# Patient Record
Sex: MALE | Race: ASIAN | NOT HISPANIC OR LATINO | URBAN - METROPOLITAN AREA
[De-identification: names, ages, dates, MRNs, and addresses within clinical notes are randomized per-mention and may not be internally consistent; named-entity substitution may affect disease eponyms.]

---

## 2017-01-30 ENCOUNTER — INPATIENT (INPATIENT)
Facility: HOSPITAL | Age: 82
LOS: 3 days | Discharge: NURSING, REHAB OR LONG-TERM CARE | End: 2017-02-03
Attending: HOSPITALIST | Admitting: HOSPITALIST
Payer: MEDICARE

## 2017-01-30 VITALS
SYSTOLIC BLOOD PRESSURE: 150 MMHG | TEMPERATURE: 98 F | OXYGEN SATURATION: 98 % | RESPIRATION RATE: 18 BRPM | HEART RATE: 62 BPM | DIASTOLIC BLOOD PRESSURE: 56 MMHG

## 2017-01-30 DIAGNOSIS — Z41.8 ENCOUNTER FOR OTHER PROCEDURES FOR PURPOSES OTHER THAN REMEDYING HEALTH STATE: ICD-10-CM

## 2017-01-30 DIAGNOSIS — R33.9 RETENTION OF URINE, UNSPECIFIED: ICD-10-CM

## 2017-01-30 DIAGNOSIS — R74.0 NONSPECIFIC ELEVATION OF LEVELS OF TRANSAMINASE AND LACTIC ACID DEHYDROGENASE [LDH]: ICD-10-CM

## 2017-01-30 DIAGNOSIS — Z90.79 ACQUIRED ABSENCE OF OTHER GENITAL ORGAN(S): Chronic | ICD-10-CM

## 2017-01-30 DIAGNOSIS — E11.9 TYPE 2 DIABETES MELLITUS WITHOUT COMPLICATIONS: ICD-10-CM

## 2017-01-30 DIAGNOSIS — E78.5 HYPERLIPIDEMIA, UNSPECIFIED: ICD-10-CM

## 2017-01-30 DIAGNOSIS — E16.2 HYPOGLYCEMIA, UNSPECIFIED: ICD-10-CM

## 2017-01-30 DIAGNOSIS — I10 ESSENTIAL (PRIMARY) HYPERTENSION: ICD-10-CM

## 2017-01-30 DIAGNOSIS — G93.41 METABOLIC ENCEPHALOPATHY: ICD-10-CM

## 2017-01-30 DIAGNOSIS — N19 UNSPECIFIED KIDNEY FAILURE: ICD-10-CM

## 2017-01-30 DIAGNOSIS — N17.9 ACUTE KIDNEY FAILURE, UNSPECIFIED: ICD-10-CM

## 2017-01-30 DIAGNOSIS — R53.1 WEAKNESS: ICD-10-CM

## 2017-01-30 LAB
ALBUMIN SERPL ELPH-MCNC: 3.4 G/DL — SIGNIFICANT CHANGE UP (ref 3.3–5)
ALP SERPL-CCNC: 80 U/L — SIGNIFICANT CHANGE UP (ref 40–120)
ALT FLD-CCNC: 50 U/L — HIGH (ref 4–41)
APPEARANCE UR: CLEAR — SIGNIFICANT CHANGE UP
AST SERPL-CCNC: 59 U/L — HIGH (ref 4–40)
BACTERIA # UR AUTO: SIGNIFICANT CHANGE UP
BASE EXCESS BLDV CALC-SCNC: -0.8 MMOL/L — SIGNIFICANT CHANGE UP
BASOPHILS # BLD AUTO: 0.01 K/UL — SIGNIFICANT CHANGE UP (ref 0–0.2)
BASOPHILS NFR BLD AUTO: 0.1 % — SIGNIFICANT CHANGE UP (ref 0–2)
BILIRUB SERPL-MCNC: 0.9 MG/DL — SIGNIFICANT CHANGE UP (ref 0.2–1.2)
BILIRUB UR-MCNC: NEGATIVE — SIGNIFICANT CHANGE UP
BLOOD GAS VENOUS - CREATININE: 9.05 MG/DL — HIGH (ref 0.5–1.3)
BLOOD UR QL VISUAL: HIGH
BUN SERPL-MCNC: 120 MG/DL — HIGH (ref 7–23)
BUN SERPL-MCNC: 95 MG/DL — HIGH (ref 7–23)
CALCIUM SERPL-MCNC: 8 MG/DL — LOW (ref 8.4–10.5)
CALCIUM SERPL-MCNC: 8.6 MG/DL — SIGNIFICANT CHANGE UP (ref 8.4–10.5)
CHLORIDE BLDV-SCNC: 93 MMOL/L — LOW (ref 96–108)
CHLORIDE SERPL-SCNC: 86 MMOL/L — LOW (ref 98–107)
CHLORIDE SERPL-SCNC: 97 MMOL/L — LOW (ref 98–107)
CO2 SERPL-SCNC: 21 MMOL/L — LOW (ref 22–31)
CO2 SERPL-SCNC: 22 MMOL/L — SIGNIFICANT CHANGE UP (ref 22–31)
COLOR SPEC: YELLOW — SIGNIFICANT CHANGE UP
CREAT SERPL-MCNC: 5.22 MG/DL — HIGH (ref 0.5–1.3)
CREAT SERPL-MCNC: 8.83 MG/DL — HIGH (ref 0.5–1.3)
EOSINOPHIL # BLD AUTO: 0 K/UL — SIGNIFICANT CHANGE UP (ref 0–0.5)
EOSINOPHIL NFR BLD AUTO: 0 % — SIGNIFICANT CHANGE UP (ref 0–6)
GAS PNL BLDV: 128 MMOL/L — LOW (ref 136–146)
GLUCOSE BLDV-MCNC: 168 — HIGH (ref 70–99)
GLUCOSE SERPL-MCNC: 167 MG/DL — HIGH (ref 70–99)
GLUCOSE SERPL-MCNC: 178 MG/DL — HIGH (ref 70–99)
GLUCOSE UR-MCNC: NEGATIVE — SIGNIFICANT CHANGE UP
HCO3 BLDV-SCNC: 23 MMOL/L — SIGNIFICANT CHANGE UP (ref 20–27)
HCT VFR BLD CALC: 40.3 % — SIGNIFICANT CHANGE UP (ref 39–50)
HCT VFR BLDV CALC: 41.6 % — SIGNIFICANT CHANGE UP (ref 39–51)
HGB BLD-MCNC: 13.9 G/DL — SIGNIFICANT CHANGE UP (ref 13–17)
HGB BLDV-MCNC: 13.5 G/DL — SIGNIFICANT CHANGE UP (ref 13–17)
IMM GRANULOCYTES NFR BLD AUTO: 0.4 % — SIGNIFICANT CHANGE UP (ref 0–1.5)
KETONES UR-MCNC: NEGATIVE — SIGNIFICANT CHANGE UP
LACTATE BLDV-MCNC: 1.4 MMOL/L — SIGNIFICANT CHANGE UP (ref 0.5–2)
LEUKOCYTE ESTERASE UR-ACNC: NEGATIVE — SIGNIFICANT CHANGE UP
LYMPHOCYTES # BLD AUTO: 0.78 K/UL — LOW (ref 1–3.3)
LYMPHOCYTES # BLD AUTO: 5.2 % — LOW (ref 13–44)
MAGNESIUM SERPL-MCNC: 2.7 MG/DL — HIGH (ref 1.6–2.6)
MCHC RBC-ENTMCNC: 30.4 PG — SIGNIFICANT CHANGE UP (ref 27–34)
MCHC RBC-ENTMCNC: 34.5 % — SIGNIFICANT CHANGE UP (ref 32–36)
MCV RBC AUTO: 88.2 FL — SIGNIFICANT CHANGE UP (ref 80–100)
MONOCYTES # BLD AUTO: 1.33 K/UL — HIGH (ref 0–0.9)
MONOCYTES NFR BLD AUTO: 8.9 % — SIGNIFICANT CHANGE UP (ref 2–14)
NEUTROPHILS # BLD AUTO: 12.78 K/UL — HIGH (ref 1.8–7.4)
NEUTROPHILS NFR BLD AUTO: 85.4 % — HIGH (ref 43–77)
NITRITE UR-MCNC: NEGATIVE — SIGNIFICANT CHANGE UP
PCO2 BLDV: 44 MMHG — SIGNIFICANT CHANGE UP (ref 41–51)
PH BLDV: 7.35 PH — SIGNIFICANT CHANGE UP (ref 7.32–7.43)
PH UR: 6 — SIGNIFICANT CHANGE UP (ref 4.6–8)
PHOSPHATE SERPL-MCNC: 5.2 MG/DL — HIGH (ref 2.5–4.5)
PLATELET # BLD AUTO: 159 K/UL — SIGNIFICANT CHANGE UP (ref 150–400)
PMV BLD: 9.9 FL — SIGNIFICANT CHANGE UP (ref 7–13)
PO2 BLDV: 34 MMHG — LOW (ref 35–40)
POTASSIUM BLDV-SCNC: 4.4 MMOL/L — SIGNIFICANT CHANGE UP (ref 3.4–4.5)
POTASSIUM SERPL-MCNC: 3.1 MMOL/L — LOW (ref 3.5–5.3)
POTASSIUM SERPL-MCNC: 3.6 MMOL/L — SIGNIFICANT CHANGE UP (ref 3.5–5.3)
POTASSIUM SERPL-SCNC: 3.1 MMOL/L — LOW (ref 3.5–5.3)
POTASSIUM SERPL-SCNC: 3.6 MMOL/L — SIGNIFICANT CHANGE UP (ref 3.5–5.3)
PROT SERPL-MCNC: 7.1 G/DL — SIGNIFICANT CHANGE UP (ref 6–8.3)
PROT UR-MCNC: 30 — SIGNIFICANT CHANGE UP
RBC # BLD: 4.57 M/UL — SIGNIFICANT CHANGE UP (ref 4.2–5.8)
RBC # FLD: 13.8 % — SIGNIFICANT CHANGE UP (ref 10.3–14.5)
RBC CASTS # UR COMP ASSIST: HIGH (ref 0–?)
SAO2 % BLDV: 56.3 % — LOW (ref 60–85)
SODIUM SERPL-SCNC: 133 MMOL/L — LOW (ref 135–145)
SODIUM SERPL-SCNC: 136 MMOL/L — SIGNIFICANT CHANGE UP (ref 135–145)
SP GR SPEC: 1.02 — SIGNIFICANT CHANGE UP (ref 1–1.03)
UROBILINOGEN FLD QL: NORMAL E.U. — SIGNIFICANT CHANGE UP (ref 0.1–0.2)
WBC # BLD: 14.96 K/UL — HIGH (ref 3.8–10.5)
WBC # FLD AUTO: 14.96 K/UL — HIGH (ref 3.8–10.5)
WBC UR QL: SIGNIFICANT CHANGE UP (ref 0–?)

## 2017-01-30 PROCEDURE — 70450 CT HEAD/BRAIN W/O DYE: CPT | Mod: 26

## 2017-01-30 PROCEDURE — 71020: CPT | Mod: 26

## 2017-01-30 PROCEDURE — 99223 1ST HOSP IP/OBS HIGH 75: CPT | Mod: AI,GC

## 2017-01-30 RX ORDER — ASPIRIN/CALCIUM CARB/MAGNESIUM 324 MG
81 TABLET ORAL DAILY
Qty: 0 | Refills: 0 | Status: DISCONTINUED | OUTPATIENT
Start: 2017-01-30 | End: 2017-02-03

## 2017-01-30 RX ORDER — ACETAMINOPHEN 500 MG
650 TABLET ORAL EVERY 6 HOURS
Qty: 0 | Refills: 0 | Status: DISCONTINUED | OUTPATIENT
Start: 2017-01-30 | End: 2017-02-03

## 2017-01-30 RX ORDER — GLUCAGON INJECTION, SOLUTION 0.5 MG/.1ML
1 INJECTION, SOLUTION SUBCUTANEOUS ONCE
Qty: 0 | Refills: 0 | Status: DISCONTINUED | OUTPATIENT
Start: 2017-01-30 | End: 2017-02-03

## 2017-01-30 RX ORDER — DEXTROSE 50 % IN WATER 50 %
12.5 SYRINGE (ML) INTRAVENOUS ONCE
Qty: 0 | Refills: 0 | Status: DISCONTINUED | OUTPATIENT
Start: 2017-01-30 | End: 2017-02-03

## 2017-01-30 RX ORDER — INSULIN LISPRO 100/ML
VIAL (ML) SUBCUTANEOUS AT BEDTIME
Qty: 0 | Refills: 0 | Status: DISCONTINUED | OUTPATIENT
Start: 2017-01-30 | End: 2017-02-03

## 2017-01-30 RX ORDER — MULTIVIT-MIN/FERROUS GLUCONATE 9 MG/15 ML
1 LIQUID (ML) ORAL DAILY
Qty: 0 | Refills: 0 | Status: DISCONTINUED | OUTPATIENT
Start: 2017-01-30 | End: 2017-01-30

## 2017-01-30 RX ORDER — DEXTROSE 50 % IN WATER 50 %
25 SYRINGE (ML) INTRAVENOUS ONCE
Qty: 0 | Refills: 0 | Status: DISCONTINUED | OUTPATIENT
Start: 2017-01-30 | End: 2017-02-03

## 2017-01-30 RX ORDER — INSULIN LISPRO 100/ML
VIAL (ML) SUBCUTANEOUS
Qty: 0 | Refills: 0 | Status: DISCONTINUED | OUTPATIENT
Start: 2017-01-30 | End: 2017-02-03

## 2017-01-30 RX ORDER — POTASSIUM CHLORIDE 20 MEQ
40 PACKET (EA) ORAL EVERY 4 HOURS
Qty: 0 | Refills: 0 | Status: DISCONTINUED | OUTPATIENT
Start: 2017-01-30 | End: 2017-02-01

## 2017-01-30 RX ORDER — HEPARIN SODIUM 5000 [USP'U]/ML
5000 INJECTION INTRAVENOUS; SUBCUTANEOUS EVERY 8 HOURS
Qty: 0 | Refills: 0 | Status: DISCONTINUED | OUTPATIENT
Start: 2017-01-30 | End: 2017-02-03

## 2017-01-30 RX ORDER — SODIUM CHLORIDE 9 MG/ML
1000 INJECTION, SOLUTION INTRAVENOUS
Qty: 0 | Refills: 0 | Status: DISCONTINUED | OUTPATIENT
Start: 2017-01-30 | End: 2017-02-02

## 2017-01-30 RX ORDER — ATORVASTATIN CALCIUM 80 MG/1
40 TABLET, FILM COATED ORAL AT BEDTIME
Qty: 0 | Refills: 0 | Status: DISCONTINUED | OUTPATIENT
Start: 2017-01-30 | End: 2017-01-30

## 2017-01-30 RX ORDER — ATORVASTATIN CALCIUM 80 MG/1
10 TABLET, FILM COATED ORAL AT BEDTIME
Qty: 0 | Refills: 0 | Status: DISCONTINUED | OUTPATIENT
Start: 2017-01-30 | End: 2017-02-03

## 2017-01-30 RX ORDER — DEXTROSE 50 % IN WATER 50 %
1 SYRINGE (ML) INTRAVENOUS ONCE
Qty: 0 | Refills: 0 | Status: DISCONTINUED | OUTPATIENT
Start: 2017-01-30 | End: 2017-02-03

## 2017-01-30 RX ORDER — SODIUM CHLORIDE 9 MG/ML
2000 INJECTION INTRAMUSCULAR; INTRAVENOUS; SUBCUTANEOUS ONCE
Qty: 0 | Refills: 0 | Status: COMPLETED | OUTPATIENT
Start: 2017-01-30 | End: 2017-01-30

## 2017-01-30 RX ORDER — SODIUM CHLORIDE 9 MG/ML
1000 INJECTION, SOLUTION INTRAVENOUS
Qty: 0 | Refills: 0 | Status: DISCONTINUED | OUTPATIENT
Start: 2017-01-30 | End: 2017-02-03

## 2017-01-30 RX ADMIN — HEPARIN SODIUM 5000 UNIT(S): 5000 INJECTION INTRAVENOUS; SUBCUTANEOUS at 21:47

## 2017-01-30 RX ADMIN — HEPARIN SODIUM 5000 UNIT(S): 5000 INJECTION INTRAVENOUS; SUBCUTANEOUS at 14:08

## 2017-01-30 RX ADMIN — SODIUM CHLORIDE 1000 MILLILITER(S): 9 INJECTION INTRAMUSCULAR; INTRAVENOUS; SUBCUTANEOUS at 10:32

## 2017-01-30 RX ADMIN — Medication 40 MILLIEQUIVALENT(S): at 21:47

## 2017-01-30 RX ADMIN — Medication: at 16:16

## 2017-01-30 RX ADMIN — ATORVASTATIN CALCIUM 10 MILLIGRAM(S): 80 TABLET, FILM COATED ORAL at 21:47

## 2017-01-30 RX ADMIN — SODIUM CHLORIDE 100 MILLILITER(S): 9 INJECTION, SOLUTION INTRAVENOUS at 20:33

## 2017-01-30 NOTE — H&P ADULT. - PROBLEM SELECTOR PLAN 7
s/p hypoglycemic episode this AM.  Not on any insulin at home.  Will start ISS for now.  Measure FS regularly.

## 2017-01-30 NOTE — H&P ADULT. - PROBLEM SELECTOR PLAN 10
Improve Risk Assessment Model score: 2.  Will start HSQ Improve Risk Assessment Model score: 2.  VTE PPX indicated.  Will start HSQ

## 2017-01-30 NOTE — H&P ADULT. - PROBLEM SELECTOR PLAN 8
Will hold home losartan given elevated Cr.  Will monitor BP for now.  Q8H VS.  May start Norvasc if BP elevated.

## 2017-01-30 NOTE — ED ADULT NURSE NOTE - OBJECTIVE STATEMENT
Pt rec'd in 12, accompanied by wife. Pt A&Ox2 (knows month, but not year). Arrives with 18g saline lock to left ac by EMS. Pt c/o left arm pain, denies other complaints. Pt reports falling 5 days ago, noted with abrasion scabs to forehead and to right knee. Pt states he's not ambulatory at home, cleaned of soft stool, skin intact. Pt rec'd in 12, accompanied by wife. Pt A&Ox2 (knows month, but not year). Arrives with 18g saline lock to left ac by EMS. Pt c/o left arm pain, denies other complaints. Pt reports falling 5 days ago, noted with abrasion scabs to forehead and to right knee. Pt states he slipped when he fell because it was slippery outside. Pt was seen and discharged from another hospital for the fall. Pt states he was constipated once he got home and took laxative, having loose stools since then. Pt states he has been feeling weak lately and unable to ambulate without assistance. Pt cleaned of soft stool on arrival, skin intact. Denies chest pain or SOB, but states he felt a period of SOB this morning and that's why he came to ED. PMH of SAMARA

## 2017-01-30 NOTE — ED PROCEDURE NOTE - PROCEDURE ADDITIONAL DETAILS
Limited renal and bladder ultrasound shows a very distended bladder with over 1100ccs of urine and a 3-4cm right anterior bladder diverticulum.  Bilateral mild hydronephrosis is also noted.  See images and report in chart.  Cassi [id number]
first attempt with 18 coude curled in shaft of penis, removed with some blood. pt tolerated well.

## 2017-01-30 NOTE — H&P ADULT. - FAMILY HISTORY
No pertinent family history in first degree relatives Child  Still living? Unknown  Family history of hypertension in son, Age at diagnosis: Age Unknown

## 2017-01-30 NOTE — H&P ADULT. - PMH
DM (diabetes mellitus)    Prostate cancer DM (diabetes mellitus)    Hyperlipidemia    Hypertension    Prostate cancer

## 2017-01-30 NOTE — H&P ADULT. - PROBLEM SELECTOR PLAN 4
2/2 FTT and metabolic encephalopathy 2/2 uremia.  Will order PT consult.  Fall precautions/ambulate with asst.  May improve with resolution of uremia.

## 2017-01-30 NOTE — ED ADULT NURSE NOTE - CHIEF COMPLAINT QUOTE
p/t d/cd from [facility] 5 days ago has been week, lethargic and AMS since the discharge p/t is awake and responsive @ present half amp d50 given by ems for bs 65 repeat bs 150 mdl @ present

## 2017-01-30 NOTE — ED PROCEDURE NOTE - CPROC ED URIN DEVICE DETAIL1
Using strict sterile technique, an indwelling urinary device was inserted through the urethral meatus, and into the bladder (see size above).

## 2017-01-30 NOTE — H&P ADULT. - ATTENDING COMMENTS
82M DM2 HTN hx prostate cancer with urinary device malfunction likely causing obstructive uropathy, SAMANTHA, weakness, fall 6 days PTA  SAMANTHA due to obstruction, unknown if CKD present.  To obtain collateral info from [facility] and/or PMD. IVF, monitor electrolytes  Weakness may be due to uremia, monitor, PT eval  Insulin correction scale, hold oral DM meds  Hold ARB, follow BP use alternative agents until renal function improves

## 2017-01-30 NOTE — ED ADULT TRIAGE NOTE - CHIEF COMPLAINT QUOTE
p/t d/cd from [facility] 5 days ago has been week, lethargic and AMS since the discharge p/t is awake and responsive @ present half amp d50 given by ems for bs 65 repeat bs 150 mdl @ present

## 2017-01-30 NOTE — H&P ADULT. - NEGATIVE OPHTHALMOLOGIC SYMPTOMS
no lacrimation R/no blurred vision L/no loss of vision R/no blurred vision R/no lacrimation L/no loss of vision L

## 2017-01-30 NOTE — ED PROVIDER NOTE - PROGRESS NOTE DETAILS
ADRY EP: 83 y/o male BIBA for AMS and given D50 on route for FS of 65. Patient is A/OX2 and alert and oriented. Patient with unremarkable exam, decreased BS, moving all extremities, cardiac exam with RRR, abd exam unremarkable. Also old dry scrapes noted in forehead and right knee. Consider endocrine dysfunction, sepsis, ICH, abd pathology. Plan CBC, CMP, UA, VBG, Reassess. called Dr. Marco Antonio Roberts, unable to contact. left message for call back

## 2017-01-30 NOTE — ED PROVIDER NOTE - ATTENDING CONTRIBUTION TO CARE
Lindsey: 82 yom with DM, prostate cancer brought in for generalized weakness.  Pt had fall 6 steps 6 days ago, seen in ED, had scans and blood work negative.  Pt took stool softener and had subsequent diarrhea.  Also c.o left arm pain.  Pt appears to be in no distress, HR 50s, BP high, clear lungs, normal cardiac, soft abd upper, very firm lower abd with no tn.  no edema, healing ecchymosis and abrasion over knees forehead, left arm with from and no tn, nml strength. US shows very distended bladder with diverticulum and mild hydro, awaiting labs, likley ARF and admit

## 2017-01-30 NOTE — ED PROVIDER NOTE - MEDICAL DECISION MAKING DETAILS
82yoM hx of dm, prostate ca, pw ams and weakness. on arrival, mental status at baseline per wife, no confusion. PE with large palpable bladder, confirmed by US.  labs, monte, reass.

## 2017-01-30 NOTE — H&P ADULT. - PROBLEM SELECTOR PLAN 1
Likely 2/2 uremia.  Trend BMPs/BUN/Cr levels.  Cont. LR IVF.  Will f/u CT head non con to r/o slow ICH s/p fall.  Pt. DNR/DNI. Likely 2/2 uremia.  Trend BMPs/BUN/Cr levels.  Cont. LR IVF.  Will f/u CT head non con to r/o slow ICH s/p fall.  Holding home ASA until confirmed no ICH on head CT.  Pt. DNR/DNI.

## 2017-01-30 NOTE — ED PROVIDER NOTE - OBJECTIVE STATEMENT
82yoM hx of DM pw with ams. per ems, had low finger stick. per pt, had a fall on tuesday, but otherwise no complaints. A&Ox2. denies any complaints 82yoM hx of DM, prostate cancer pw with ams. per ems, had low finger stick. per pt, had a fall on tuesday, received full work up in [facility] w no findings, discharged on Wednesday. since then, had some constipation, took stool softenerand has had some diarhea since then. this am, felt weak and "weird" so he called EMS. since his fall, endorses some left elbow pain and soreness all over. endorses some mild "fullness" in abd.   denies fevers, chills, n/v, chest pain, abd pain, sob.

## 2017-01-30 NOTE — H&P ADULT. - PROBLEM SELECTOR PLAN 6
Likely 2/2 tylenol use.  Will f/u serum acetaminophen level.  As TAs are only mildly elevated, there should be no issue with using Tylenol for pain control.

## 2017-01-30 NOTE — H&P ADULT. - HISTORY OF PRESENT ILLNESS
WBC: 14.96, Na: 133, BUN/Cr: 120/8.83, CO2: 21, moderate hematuria.  Lemus inserted, 1.4L urine output.  Received 2L NS bolus. 82M PMH T2DM, Prostate CA s/p prostatectomy    ED: 36.7, 62, 150/56, 18, 98% RA.  WBC: 14.96, Na: 133, BUN/Cr: 120/8.83, CO2: 21, moderate hematuria.  Lemus inserted, 1.4L urine output.  Received 2L NS bolus. 82M PMH T2DM, Prostate CA s/p prostatectomy    fell last tuesday, hit forehead.  had weakness in arms and legs in days leading up to.  inability to stand.  Started to use a cane.  since fall, immobile.  Requires total assistance with ambulation, sitting up, bathroom.  Using tylenol for pain control, stomach pain, diffuse myalgia.  constipation, colace dulcolax, prune juice.  now diarrhea.  incontinent bowels.  incont after prostatectomy implant in place device stopped working awhile ago.  this am loss of appetite, unresponsive lethargic, dyspnea last night felt hot, nausea      ED: 36.7, 62, 150/56, 18, 98% RA.  WBC: 14.96, Na: 133, BUN/Cr: 120/8.83, CO2: 21, moderate hematuria.  Lemus inserted, 1.4L urine output.  Received 2L NS bolus. 82M PMH T2DM, Prostate CA s/p prostatectomy (~20 yrs ago, currently cancer free) s/p incontinence control device placement p/w generalized weakness, FTT.  The patient fell last Tuesday on his stairs and hit his forehead; received full workup at [facility] with no findings.  As per son, Mendez, the pt. had been exhibiting weakness in his extremities in the days leading up to the fall.  He recently developed an difficulty in standing up on his own and started requiring use of a cane with ambulation.  Since the fall, he has had a steep decline in immobility and has become more bed bound, requiring total assistance with ambulation, rising to a standing position and using the bathroom.   He also has been experiencing diffuse myalgia and abdominal discomfort 2/2 constipation, for which he has been taking liberal amounts of Tylenol to control.  The constipation was treated with dulcolax, colace and prune juice and he then began experiencing diarrhea and bowel incontinence.  On morning of presentation, he had complete loss of appetite and became lethargic and unresponsive.  Hypoglycemic as per EMS, treated accordingly.    He has baseline urinary incontinence after his prostatectomy and has a incontinence control device implant which he uses to assist with urination.  Lately, he has been exhibiting more urinary incontinence and told his son the the device stopped working "a while ago."  Only other symptoms were feeling hot flashes in bed last night and dyspneic, as well as 2 days of nausea.  No hx of recent travel, recent illnesses, sick contacts or medication changes.    ED: 36.7, 62, 150/56, 18, 98% RA.  WBC: 14.96, Na: 133, BUN/Cr: 120/8.83, CO2: 21, moderate hematuria.  Lemus inserted, 1.4L urine output.  Received 2L NS bolus. 82M PMH T2DM, Prostate CA s/p prostatectomy (~20 yrs ago, currently cancer free) s/p incontinence control device placement p/w generalized weakness, FTT.  The patient fell last Tuesday on his stairs and hit his forehead; received full workup at [facility] with no findings.  As per son, Mendez, the pt. had been exhibiting weakness in his extremities in the days leading up to the fall.  He recently developed an difficulty in standing up on his own and started requiring use of a cane with ambulation.  Since the fall, he has had a steep decline in immobility and has become more bed bound, requiring total assistance with ambulation, rising to a standing position and using the bathroom.   He also has been experiencing diffuse myalgia and abdominal discomfort 2/2 constipation, for which he has been taking liberal amounts of Tylenol to control.  The constipation was treated with dulcolax, colace and prune juice and he then began experiencing diarrhea and bowel incontinence.  On morning of presentation, he had complete loss of appetite and became lethargic and unresponsive.  Hypoglycemic as per EMS, treated accordingly.    He has baseline urinary incontinence after his prostatectomy and has a incontinence control device implant which he uses to assist with urination.  Lately, he has been exhibiting more urinary incontinence and told his son the the device stopped working "a while ago."  Only other symptoms were feeling hot flashes in bed last night and dyspneic, as well as 2 days of nausea.  No hx of recent travel, recent illnesses, sick contacts or medication changes.    ED: 36.7, 62, 150/56, 18, 98% RA.  WBC: 14.96, Na: 133, BUN/Cr: 120/8.83, CO2: 21, moderate hematuria.  Lemus inserted, 1.4L urine output.  Received 2L NS bolus.    A brief goals of care discussion was held with Mendez Dixon (son): currently strongly considering DNR/DNI, but will reach final conclusion upon arrival to hospital. 82M PMH T2DM, Prostate CA s/p prostatectomy (~20 yrs ago, currently cancer free) s/p incontinence control device placement p/w generalized weakness, FTT.  The patient fell last Tuesday on his stairs and hit his forehead; received full workup at [facility] with no findings.  As per son, Mendez, the pt. had been exhibiting weakness in his extremities in the days leading up to the fall.  He recently developed an difficulty in standing up on his own and started requiring use of a cane with ambulation.  Since the fall, he has had a steep decline in immobility and has become more bed bound, requiring total assistance with ambulation, rising to a standing position and using the bathroom.   He also has been experiencing diffuse myalgia and abdominal discomfort 2/2 constipation, for which he has been taking liberal amounts of Tylenol to control.  The constipation was treated with dulcolax, colace and prune juice and he then began experiencing diarrhea and bowel incontinence.  On morning of presentation, he had complete loss of appetite and became lethargic and unresponsive.  Hypoglycemic as per EMS, treated accordingly.    He has baseline urinary incontinence after his prostatectomy and has a incontinence control device implant which he uses to assist with urination.  Lately, he has been exhibiting more urinary incontinence and told his son the the device stopped working "a while ago."  Only other symptoms were feeling hot flashes in bed last night and dyspneic, as well as 2 days of nausea.  No hx of recent travel, recent illnesses, sick contacts or medication changes.  No hx of CKD as per son.    ED: 36.7, 62, 150/56, 18, 98% RA.  WBC: 14.96, Na: 133, BUN/Cr: 120/8.83, CO2: 21, moderate hematuria.  Lemus inserted, 1.4L urine output.  Received 2L NS bolus.    A brief goals of care discussion was held with Mendez Dixon (son): currently strongly considering DNR/DNI, but will reach final conclusion upon arrival to hospital.    An attempt was made to reach Dr. Arnold Roberts's office for further information., but the office was closed.  Will try again tomorrow. 82M PMH T2DM, Prostate CA s/p prostatectomy (~20 yrs ago, currently cancer free) s/p incontinence control device placement p/w generalized weakness, FTT.  The patient fell last Tuesday on his stairs and hit his forehead; received full workup at [facility] with no findings.  As per son, Mendez, the pt. had been exhibiting weakness in his extremities in the days leading up to the fall.  He recently developed an difficulty in standing up on his own and started requiring use of a cane with ambulation.  Since the fall, he has had a steep decline in immobility and has become more bed bound, requiring total assistance with ambulation, rising to a standing position and using the bathroom.   He also has been experiencing diffuse myalgia and abdominal discomfort 2/2 constipation, for which he has been taking liberal amounts of Tylenol to control.  The constipation was treated with dulcolax, colace and prune juice and he then began experiencing diarrhea and bowel incontinence.  On morning of presentation, he had complete loss of appetite and became lethargic and unresponsive.  Hypoglycemic as per EMS, treated accordingly.    He has baseline urinary incontinence after his prostatectomy and has a incontinence control device implant which he uses to assist with urination.  Lately, he has been exhibiting more urinary incontinence and told his son the the device stopped working "a while ago."  Only other symptoms were feeling hot flashes in bed last night and dyspneic, as well as 2 days of nausea.  No hx of recent travel, recent illnesses, sick contacts or medication changes.  No hx of CKD as per son.    ED: 36.7, 62, 150/56, 18, 98% RA.  WBC: 14.96, Na: 133, BUN/Cr: 120/8.83, CO2: 21, moderate hematuria.  Lemus inserted, 1.4L urine output.  Received 2L NS bolus.    A goals of care discussion was held with Mendez Dixon (son).  Him and sister are in process of becoming patient's HCPs.  DNR/DNI form signed as result of conversation.    An attempt was made to reach Dr. Arnold Roberts's office for further information., but the office was closed.  Will try again tomorrow. 82M PMH T2DM, Prostate CA s/p prostatectomy (~20 yrs ago, currently cancer free) s/p incontinence control device placement p/w generalized weakness, FTT.  The patient fell last Tuesday (1/24/17) on his stairs and hit his forehead; received full workup at [facility] with no findings.  As per son, Mendez, the pt. had been exhibiting weakness in his extremities in the days leading up to the fall.  He recently developed an difficulty in standing up on his own and started requiring use of a cane with ambulation.  Since the fall, he has had a steep decline in immobility and has become more bed bound, requiring total assistance with ambulation, rising to a standing position and using the bathroom.   He also has been experiencing diffuse myalgia and abdominal discomfort 2/2 constipation, for which he has been taking liberal amounts of Tylenol to control.  The constipation was treated with dulcolax, colace and prune juice and he then began experiencing diarrhea and bowel incontinence.  On morning of presentation, he had complete loss of appetite and became lethargic and unresponsive.  Hypoglycemic as per EMS, treated accordingly.    He has baseline urinary incontinence after his prostatectomy and has a incontinence control device implant which he uses to assist with urination.  Lately, he has been exhibiting more urinary incontinence and told his son the the device stopped working "a while ago."  Only other symptoms were feeling hot flashes in bed last night and dyspneic, as well as 2 days of nausea.  No hx of recent travel, recent illnesses, sick contacts or medication changes.  No hx of CKD as per son.    ED: 36.7, 62, 150/56, 18, 98% RA.  WBC: 14.96, Na: 133, BUN/Cr: 120/8.83, CO2: 21, moderate hematuria.  Lemus inserted, 1.4L urine output.  Received 2L NS bolus.    A goals of care discussion was held with Mendez Dixon (son).  Him and sister are in process of becoming patient's HCPs.  DNR/DNI form signed as result of conversation.    An attempt was made to reach Dr. Arnold Roberts's office for further information., but the office was closed.  Will try again tomorrow.

## 2017-01-30 NOTE — H&P ADULT. - ASSESSMENT
82M PMH T2DM, Prostate CA s/p prostatectomy (~20 yrs ago, currently cancer free) s/p incontinence control device placement p/w generalized weakness, FTT, urinary retention. 82M PMH T2DM, Prostate CA s/p prostatectomy (~20 yrs ago, currently cancer free) s/p incontinence control device placement p/w generalized weakness, FTT, urinary retention 2/2 acute metabolic encephalopathy 2/2 SAMANTHA 2/2 malfunctioning incontinence assist device. 82M PMH T2DM, Prostate CA s/p prostatectomy (~20 yrs ago, currently cancer free) s/p incontinence control device placement p/w generalized weakness, FTT, urinary retention 2/2 acute metabolic encephalopathy 2/2 uremia 2/2 SAMANTHA 2/2 malfunctioning incontinence assist device. 82M PMH T2DM, Prostate CA s/p prostatectomy (~20 yrs ago, currently cancer free), HTN, HLD s/p incontinence control device placement p/w generalized weakness, FTT, urinary retention 2/2 acute metabolic encephalopathy 2/2 uremia 2/2 SAMANTHA 2/2 malfunctioning incontinence assist device.

## 2017-01-30 NOTE — H&P ADULT. - PROBLEM SELECTOR PLAN 2
Likely 2/2 urinary obstruction from malfunctioning incontinence device.  Will consult  and renal.  F/U renal US.  Trend BUN/Cr with daily BMPs.  Cont. LR IVF.  No known hx of CKD, but will obtain further hx from PMD in AM.

## 2017-01-31 LAB
APAP SERPL-MCNC: < 15 UG/ML — LOW (ref 15–25)
BACTERIA UR CULT: SIGNIFICANT CHANGE UP
BUN SERPL-MCNC: 56 MG/DL — HIGH (ref 7–23)
BUN SERPL-MCNC: 80 MG/DL — HIGH (ref 7–23)
CALCIUM SERPL-MCNC: 8.4 MG/DL — SIGNIFICANT CHANGE UP (ref 8.4–10.5)
CALCIUM SERPL-MCNC: 8.5 MG/DL — SIGNIFICANT CHANGE UP (ref 8.4–10.5)
CHLORIDE SERPL-SCNC: 101 MMOL/L — SIGNIFICANT CHANGE UP (ref 98–107)
CHLORIDE SERPL-SCNC: 104 MMOL/L — SIGNIFICANT CHANGE UP (ref 98–107)
CO2 SERPL-SCNC: 23 MMOL/L — SIGNIFICANT CHANGE UP (ref 22–31)
CO2 SERPL-SCNC: 24 MMOL/L — SIGNIFICANT CHANGE UP (ref 22–31)
CREAT SERPL-MCNC: 2.11 MG/DL — HIGH (ref 0.5–1.3)
CREAT SERPL-MCNC: 3.74 MG/DL — HIGH (ref 0.5–1.3)
GLUCOSE SERPL-MCNC: 110 MG/DL — HIGH (ref 70–99)
GLUCOSE SERPL-MCNC: 141 MG/DL — HIGH (ref 70–99)
HBA1C BLD-MCNC: 6.9 % — HIGH (ref 4–5.6)
HCT VFR BLD CALC: 37.7 % — LOW (ref 39–50)
HGB BLD-MCNC: 12.9 G/DL — LOW (ref 13–17)
MAGNESIUM SERPL-MCNC: 2.7 MG/DL — HIGH (ref 1.6–2.6)
MCHC RBC-ENTMCNC: 30.4 PG — SIGNIFICANT CHANGE UP (ref 27–34)
MCHC RBC-ENTMCNC: 34.2 % — SIGNIFICANT CHANGE UP (ref 32–36)
MCV RBC AUTO: 88.9 FL — SIGNIFICANT CHANGE UP (ref 80–100)
PHOSPHATE SERPL-MCNC: 2.1 MG/DL — LOW (ref 2.5–4.5)
PLATELET # BLD AUTO: 166 K/UL — SIGNIFICANT CHANGE UP (ref 150–400)
PMV BLD: 9.9 FL — SIGNIFICANT CHANGE UP (ref 7–13)
POTASSIUM SERPL-MCNC: 3.5 MMOL/L — SIGNIFICANT CHANGE UP (ref 3.5–5.3)
POTASSIUM SERPL-MCNC: 3.9 MMOL/L — SIGNIFICANT CHANGE UP (ref 3.5–5.3)
POTASSIUM SERPL-SCNC: 3.5 MMOL/L — SIGNIFICANT CHANGE UP (ref 3.5–5.3)
POTASSIUM SERPL-SCNC: 3.9 MMOL/L — SIGNIFICANT CHANGE UP (ref 3.5–5.3)
RBC # BLD: 4.24 M/UL — SIGNIFICANT CHANGE UP (ref 4.2–5.8)
RBC # FLD: 13.8 % — SIGNIFICANT CHANGE UP (ref 10.3–14.5)
SODIUM SERPL-SCNC: 138 MMOL/L — SIGNIFICANT CHANGE UP (ref 135–145)
SODIUM SERPL-SCNC: 143 MMOL/L — SIGNIFICANT CHANGE UP (ref 135–145)
SPECIMEN SOURCE: SIGNIFICANT CHANGE UP
WBC # BLD: 8.7 K/UL — SIGNIFICANT CHANGE UP (ref 3.8–10.5)
WBC # FLD AUTO: 8.7 K/UL — SIGNIFICANT CHANGE UP (ref 3.8–10.5)

## 2017-01-31 PROCEDURE — 76770 US EXAM ABDO BACK WALL COMP: CPT | Mod: 26

## 2017-01-31 PROCEDURE — 99232 SBSQ HOSP IP/OBS MODERATE 35: CPT | Mod: GC

## 2017-01-31 RX ADMIN — HEPARIN SODIUM 5000 UNIT(S): 5000 INJECTION INTRAVENOUS; SUBCUTANEOUS at 14:41

## 2017-01-31 RX ADMIN — Medication 81 MILLIGRAM(S): at 14:41

## 2017-01-31 RX ADMIN — Medication 40 MILLIEQUIVALENT(S): at 01:24

## 2017-01-31 RX ADMIN — HEPARIN SODIUM 5000 UNIT(S): 5000 INJECTION INTRAVENOUS; SUBCUTANEOUS at 05:00

## 2017-01-31 RX ADMIN — ATORVASTATIN CALCIUM 10 MILLIGRAM(S): 80 TABLET, FILM COATED ORAL at 21:32

## 2017-01-31 RX ADMIN — HEPARIN SODIUM 5000 UNIT(S): 5000 INJECTION INTRAVENOUS; SUBCUTANEOUS at 21:32

## 2017-01-31 RX ADMIN — Medication 1 TABLET(S): at 14:41

## 2017-01-31 NOTE — PROVIDER CONTACT NOTE (OTHER) - ACTION/TREATMENT ORDERED:
Dr Campbell aware. No interventions ordered at this time Dr Campbell aware of vitals. No interventions ordered at this time-will continue to monitor pt

## 2017-01-31 NOTE — PHYSICAL THERAPY INITIAL EVALUATION ADULT - PERTINENT HX OF CURRENT PROBLEM, REHAB EVAL
82M PMH T2DM, Prostate CA s/p prostatectomy (~20 yrs ago, currently cancer free) s/p incontinence control device placement p/w generalized weakness, FTT.  The patient fell last Tuesday (1/24/17) on his stairs and hit his forehead; received full workup at [facility] with no findings.  As per sonMendez, the pt. had been exhibiting weakness in his extremities in the days leading up to the fall.

## 2017-01-31 NOTE — PHYSICAL THERAPY INITIAL EVALUATION ADULT - ADDITIONAL COMMENTS
Pt lives in 2nd floor walk up apartment with wife. Prior to a few weeks ago pt ambulated with a cane, however has relied more on a walker since prior to admission. Pt requires assist to ambulate and often has falls and cannot recover from floor.

## 2017-01-31 NOTE — PROVIDER CONTACT NOTE (OTHER) - ASSESSMENT
Bp 151/52 hr 56 at 5:23a. Repeat 147/57 hr 55 Bp 151/52 hr 56 at 5:23a. Repeat 147/57 hr 55 pt asymptomatic resting in bed

## 2017-02-01 LAB
BUN SERPL-MCNC: 34 MG/DL — HIGH (ref 7–23)
CALCIUM SERPL-MCNC: 9.1 MG/DL — SIGNIFICANT CHANGE UP (ref 8.4–10.5)
CHLORIDE SERPL-SCNC: 105 MMOL/L — SIGNIFICANT CHANGE UP (ref 98–107)
CO2 SERPL-SCNC: 25 MMOL/L — SIGNIFICANT CHANGE UP (ref 22–31)
CREAT SERPL-MCNC: 1.25 MG/DL — SIGNIFICANT CHANGE UP (ref 0.5–1.3)
GLUCOSE SERPL-MCNC: 94 MG/DL — SIGNIFICANT CHANGE UP (ref 70–99)
PHOSPHATE SERPL-MCNC: 1.8 MG/DL — LOW (ref 2.5–4.5)
POTASSIUM SERPL-MCNC: 4 MMOL/L — SIGNIFICANT CHANGE UP (ref 3.5–5.3)
POTASSIUM SERPL-SCNC: 4 MMOL/L — SIGNIFICANT CHANGE UP (ref 3.5–5.3)
SODIUM SERPL-SCNC: 145 MMOL/L — SIGNIFICANT CHANGE UP (ref 135–145)

## 2017-02-01 PROCEDURE — 99232 SBSQ HOSP IP/OBS MODERATE 35: CPT | Mod: GC

## 2017-02-01 RX ORDER — AMLODIPINE BESYLATE 2.5 MG/1
5 TABLET ORAL DAILY
Qty: 0 | Refills: 0 | Status: DISCONTINUED | OUTPATIENT
Start: 2017-02-01 | End: 2017-02-02

## 2017-02-01 RX ORDER — AMLODIPINE BESYLATE 2.5 MG/1
5 TABLET ORAL DAILY
Qty: 0 | Refills: 0 | Status: DISCONTINUED | OUTPATIENT
Start: 2017-02-01 | End: 2017-02-01

## 2017-02-01 RX ADMIN — HEPARIN SODIUM 5000 UNIT(S): 5000 INJECTION INTRAVENOUS; SUBCUTANEOUS at 05:19

## 2017-02-01 RX ADMIN — Medication 63.75 MILLIMOLE(S): at 11:30

## 2017-02-01 RX ADMIN — AMLODIPINE BESYLATE 5 MILLIGRAM(S): 2.5 TABLET ORAL at 21:34

## 2017-02-01 RX ADMIN — HEPARIN SODIUM 5000 UNIT(S): 5000 INJECTION INTRAVENOUS; SUBCUTANEOUS at 13:17

## 2017-02-01 RX ADMIN — ATORVASTATIN CALCIUM 10 MILLIGRAM(S): 80 TABLET, FILM COATED ORAL at 21:34

## 2017-02-01 RX ADMIN — Medication 81 MILLIGRAM(S): at 13:16

## 2017-02-01 RX ADMIN — Medication 1 TABLET(S): at 11:32

## 2017-02-01 NOTE — PROVIDER CONTACT NOTE (OTHER) - BACKGROUND
Pt admitted with acute renal failure.
dx is acute renal failure. hx of hld, htn, DM, prostate ca w hx of prostatectomy

## 2017-02-02 LAB
BUN SERPL-MCNC: 24 MG/DL — HIGH (ref 7–23)
CALCIUM SERPL-MCNC: 8.7 MG/DL — SIGNIFICANT CHANGE UP (ref 8.4–10.5)
CHLORIDE SERPL-SCNC: 101 MMOL/L — SIGNIFICANT CHANGE UP (ref 98–107)
CO2 SERPL-SCNC: 26 MMOL/L — SIGNIFICANT CHANGE UP (ref 22–31)
CREAT SERPL-MCNC: 1.06 MG/DL — SIGNIFICANT CHANGE UP (ref 0.5–1.3)
GLUCOSE SERPL-MCNC: 95 MG/DL — SIGNIFICANT CHANGE UP (ref 70–99)
HCT VFR BLD CALC: 39.9 % — SIGNIFICANT CHANGE UP (ref 39–50)
HGB BLD-MCNC: 13.5 G/DL — SIGNIFICANT CHANGE UP (ref 13–17)
MCHC RBC-ENTMCNC: 30.3 PG — SIGNIFICANT CHANGE UP (ref 27–34)
MCHC RBC-ENTMCNC: 33.8 % — SIGNIFICANT CHANGE UP (ref 32–36)
MCV RBC AUTO: 89.5 FL — SIGNIFICANT CHANGE UP (ref 80–100)
PHOSPHATE SERPL-MCNC: 1.7 MG/DL — LOW (ref 2.5–4.5)
PLATELET # BLD AUTO: 194 K/UL — SIGNIFICANT CHANGE UP (ref 150–400)
PMV BLD: 9.8 FL — SIGNIFICANT CHANGE UP (ref 7–13)
POTASSIUM SERPL-MCNC: 3.6 MMOL/L — SIGNIFICANT CHANGE UP (ref 3.5–5.3)
POTASSIUM SERPL-SCNC: 3.6 MMOL/L — SIGNIFICANT CHANGE UP (ref 3.5–5.3)
RBC # BLD: 4.46 M/UL — SIGNIFICANT CHANGE UP (ref 4.2–5.8)
RBC # FLD: 13 % — SIGNIFICANT CHANGE UP (ref 10.3–14.5)
SODIUM SERPL-SCNC: 141 MMOL/L — SIGNIFICANT CHANGE UP (ref 135–145)
WBC # BLD: 8.48 K/UL — SIGNIFICANT CHANGE UP (ref 3.8–10.5)
WBC # FLD AUTO: 8.48 K/UL — SIGNIFICANT CHANGE UP (ref 3.8–10.5)

## 2017-02-02 PROCEDURE — 99232 SBSQ HOSP IP/OBS MODERATE 35: CPT | Mod: GC

## 2017-02-02 RX ORDER — SODIUM,POTASSIUM PHOSPHATES 278-250MG
2 POWDER IN PACKET (EA) ORAL
Qty: 0 | Refills: 0 | Status: DISCONTINUED | OUTPATIENT
Start: 2017-02-02 | End: 2017-02-02

## 2017-02-02 RX ORDER — VALSARTAN 80 MG/1
160 TABLET ORAL DAILY
Qty: 0 | Refills: 0 | Status: DISCONTINUED | OUTPATIENT
Start: 2017-02-03 | End: 2017-02-03

## 2017-02-02 RX ORDER — SODIUM,POTASSIUM PHOSPHATES 278-250MG
1 POWDER IN PACKET (EA) ORAL
Qty: 0 | Refills: 0 | Status: DISCONTINUED | OUTPATIENT
Start: 2017-02-02 | End: 2017-02-02

## 2017-02-02 RX ORDER — POTASSIUM PHOSPHATE, MONOBASIC POTASSIUM PHOSPHATE, DIBASIC 236; 224 MG/ML; MG/ML
15 INJECTION, SOLUTION INTRAVENOUS ONCE
Qty: 0 | Refills: 0 | Status: COMPLETED | OUTPATIENT
Start: 2017-02-02 | End: 2017-02-02

## 2017-02-02 RX ADMIN — ATORVASTATIN CALCIUM 10 MILLIGRAM(S): 80 TABLET, FILM COATED ORAL at 22:10

## 2017-02-02 RX ADMIN — HEPARIN SODIUM 5000 UNIT(S): 5000 INJECTION INTRAVENOUS; SUBCUTANEOUS at 22:11

## 2017-02-02 RX ADMIN — Medication 1 TABLET(S): at 12:47

## 2017-02-02 RX ADMIN — HEPARIN SODIUM 5000 UNIT(S): 5000 INJECTION INTRAVENOUS; SUBCUTANEOUS at 14:06

## 2017-02-02 RX ADMIN — Medication 81 MILLIGRAM(S): at 12:47

## 2017-02-02 RX ADMIN — POTASSIUM PHOSPHATE, MONOBASIC POTASSIUM PHOSPHATE, DIBASIC 62.5 MILLIMOLE(S): 236; 224 INJECTION, SOLUTION INTRAVENOUS at 12:49

## 2017-02-02 RX ADMIN — AMLODIPINE BESYLATE 5 MILLIGRAM(S): 2.5 TABLET ORAL at 05:33

## 2017-02-03 VITALS
OXYGEN SATURATION: 100 % | HEART RATE: 64 BPM | TEMPERATURE: 99 F | RESPIRATION RATE: 18 BRPM | SYSTOLIC BLOOD PRESSURE: 135 MMHG | DIASTOLIC BLOOD PRESSURE: 55 MMHG

## 2017-02-03 LAB
BUN SERPL-MCNC: 20 MG/DL — SIGNIFICANT CHANGE UP (ref 7–23)
CALCIUM SERPL-MCNC: 9 MG/DL — SIGNIFICANT CHANGE UP (ref 8.4–10.5)
CHLORIDE SERPL-SCNC: 100 MMOL/L — SIGNIFICANT CHANGE UP (ref 98–107)
CO2 SERPL-SCNC: 27 MMOL/L — SIGNIFICANT CHANGE UP (ref 22–31)
CREAT SERPL-MCNC: 1 MG/DL — SIGNIFICANT CHANGE UP (ref 0.5–1.3)
GLUCOSE SERPL-MCNC: 90 MG/DL — SIGNIFICANT CHANGE UP (ref 70–99)
HCT VFR BLD CALC: 39.4 % — SIGNIFICANT CHANGE UP (ref 39–50)
HGB BLD-MCNC: 13.4 G/DL — SIGNIFICANT CHANGE UP (ref 13–17)
MCHC RBC-ENTMCNC: 30.2 PG — SIGNIFICANT CHANGE UP (ref 27–34)
MCHC RBC-ENTMCNC: 34 % — SIGNIFICANT CHANGE UP (ref 32–36)
MCV RBC AUTO: 88.9 FL — SIGNIFICANT CHANGE UP (ref 80–100)
PHOSPHATE SERPL-MCNC: 1.9 MG/DL — LOW (ref 2.5–4.5)
PLATELET # BLD AUTO: 205 K/UL — SIGNIFICANT CHANGE UP (ref 150–400)
PMV BLD: 9.5 FL — SIGNIFICANT CHANGE UP (ref 7–13)
POTASSIUM SERPL-MCNC: 3.6 MMOL/L — SIGNIFICANT CHANGE UP (ref 3.5–5.3)
POTASSIUM SERPL-SCNC: 3.6 MMOL/L — SIGNIFICANT CHANGE UP (ref 3.5–5.3)
RBC # BLD: 4.43 M/UL — SIGNIFICANT CHANGE UP (ref 4.2–5.8)
RBC # FLD: 12.8 % — SIGNIFICANT CHANGE UP (ref 10.3–14.5)
SODIUM SERPL-SCNC: 140 MMOL/L — SIGNIFICANT CHANGE UP (ref 135–145)
WBC # BLD: 9.58 K/UL — SIGNIFICANT CHANGE UP (ref 3.8–10.5)
WBC # FLD AUTO: 9.58 K/UL — SIGNIFICANT CHANGE UP (ref 3.8–10.5)

## 2017-02-03 PROCEDURE — 99239 HOSP IP/OBS DSCHRG MGMT >30: CPT

## 2017-02-03 RX ORDER — VALSARTAN 80 MG/1
1 TABLET ORAL
Qty: 0 | Refills: 0 | COMMUNITY
Start: 2017-02-03

## 2017-02-03 RX ORDER — POTASSIUM PHOSPHATE, MONOBASIC POTASSIUM PHOSPHATE, DIBASIC 236; 224 MG/ML; MG/ML
15 INJECTION, SOLUTION INTRAVENOUS ONCE
Qty: 0 | Refills: 0 | Status: COMPLETED | OUTPATIENT
Start: 2017-02-03 | End: 2017-02-03

## 2017-02-03 RX ADMIN — Medication 1 TABLET(S): at 13:05

## 2017-02-03 RX ADMIN — VALSARTAN 160 MILLIGRAM(S): 80 TABLET ORAL at 06:39

## 2017-02-03 RX ADMIN — HEPARIN SODIUM 5000 UNIT(S): 5000 INJECTION INTRAVENOUS; SUBCUTANEOUS at 06:38

## 2017-02-03 RX ADMIN — POTASSIUM PHOSPHATE, MONOBASIC POTASSIUM PHOSPHATE, DIBASIC 62.5 MILLIMOLE(S): 236; 224 INJECTION, SOLUTION INTRAVENOUS at 10:41

## 2017-02-03 NOTE — DISCHARGE NOTE ADULT - ADDITIONAL INSTRUCTIONS
Please follow up with your PMD within 1 week of discharge.  Follow up with urology Dr. Uriel King within 1 week of discharge [phone] Please follow up with your PMD Dr. Marco Antonio Roberts on 2/13/2017 at 3 PM  Follow up with urology Dr. Uriel King within 1 week of discharge [phone]

## 2017-02-03 NOTE — DISCHARGE NOTE ADULT - PLAN OF CARE
To manage kidney disease You developed acute kidney failure likely due to obstruction in your urinary tract. Your kidney function improved after insertion of Lemus catheter. Please continue to use Lemus and follow up with a urologist Dr. King. Continue to have adequate fluid hydration and meal intake Please continue your home medications for BP control and follow up with your PMD Continue to monitor fingersticks at home and continue with your home diabetes regimen Continue to monitor fingersticks at home and continue with your home diabetes regimen. Please eat a low carbohydrate diet. Please follow up with your PMD Dr. Roberts for further management of your diabetes.

## 2017-02-03 NOTE — DISCHARGE NOTE ADULT - CARE PLAN
Principal Discharge DX:	SAMANTHA (acute kidney injury)  Goal:	To manage kidney disease  Instructions for follow-up, activity and diet:	You developed acute kidney failure likely due to obstruction in your urinary tract. Your kidney function improved after insertion of Lemus catheter. Please continue to use Lemus and follow up with a urologist Dr. King.  Secondary Diagnosis:	Generalized weakness  Instructions for follow-up, activity and diet:	Continue to have adequate fluid hydration and meal intake  Secondary Diagnosis:	Hypertension  Instructions for follow-up, activity and diet:	Please continue your home medications for BP control and follow up with your PMD  Secondary Diagnosis:	Type 2 diabetes mellitus without complication, with long-term current use of insulin  Instructions for follow-up, activity and diet:	Continue to monitor fingersticks at home and continue with your home diabetes regimen Principal Discharge DX:	SAMANTHA (acute kidney injury)  Goal:	To manage kidney disease  Instructions for follow-up, activity and diet:	You developed acute kidney failure likely due to obstruction in your urinary tract. Your kidney function improved after insertion of Lemus catheter. Please continue to use Lemus and follow up with a urologist Dr. King.  Secondary Diagnosis:	Generalized weakness  Instructions for follow-up, activity and diet:	Continue to have adequate fluid hydration and meal intake  Secondary Diagnosis:	Hypertension  Instructions for follow-up, activity and diet:	Please continue your home medications for BP control and follow up with your PMD  Secondary Diagnosis:	Type 2 diabetes mellitus without complication, with long-term current use of insulin  Instructions for follow-up, activity and diet:	Continue to monitor fingersticks at home and continue with your home diabetes regimen. Please eat a low carbohydrate diet. Please follow up with your PMD Dr. Roberts for further management of your diabetes.

## 2017-02-03 NOTE — DISCHARGE NOTE ADULT - PATIENT PORTAL LINK FT
“You can access the [facility] Patient Portal, offered by [facility], by registering with the following website: [email or web address]”

## 2017-02-03 NOTE — DISCHARGE NOTE ADULT - MEDICATION SUMMARY - MEDICATIONS TO TAKE
I will START or STAY ON the medications listed below when I get home from the hospital:    aspirin 81 mg oral delayed release tablet  -- 1 tab(s) by mouth once a day  -- Indication: For Need for prophylactic measure    glipiZIDE 5 mg oral tablet  -- 1 tab(s) by mouth 2 times a day  -- Indication: For DM (diabetes mellitus)    Actos 15 mg oral tablet  -- 1 tab(s) by mouth once a day  -- Indication: For DM (diabetes mellitus)    Lipitor 10 mg oral tablet  -- 1 tab(s) by mouth once a day  -- Indication: For Hyperlipidemia    Diovan  mg-12.5 mg oral tablet  -- 1 tab(s) by mouth once a day  -- Indication: For Hypertension    Dulcolax Stool Softener 100 mg oral capsule  -- 1 cap(s) by mouth 2 times a day, As Needed  -- Indication: For Need for prophylactic measure    Colace 100 mg oral capsule  -- 1 cap(s) by mouth 2 times a day, As Needed  -- Indication: For Need for prophylactic measure    Ocuvite Lutein oral capsule  -- 1 cap(s) by mouth once a day  -- Indication: For Need for prophylactic measure

## 2017-02-03 NOTE — DISCHARGE NOTE ADULT - HOSPITAL COURSE
82M PMH T2DM, Prostate CA s/p prostatectomy (~20 yrs ago, currently cancer free) s/p incontinence control device placement p/w generalized weakness, FTT.  The patient fell last Tuesday (1/24/17) on his stairs and hit his forehead; received full workup at [facility] with no findings.  As per son, Mendez, the pt. had been exhibiting weakness in his extremities in the days leading up to the fall.  He recently developed an difficulty in standing up on his own and started requiring use of a cane with ambulation.  Since the fall, he has had a steep decline in immobility and has become more bed bound, requiring total assistance with ambulation, rising to a standing position and using the bathroom.   He also has been experiencing diffuse myalgia and abdominal discomfort 2/2 constipation, for which he has been taking liberal amounts of Tylenol to control.  The constipation was treated with dulcolax, colace and prune juice and he then began experiencing diarrhea and bowel incontinence.  On morning of presentation, he had complete loss of appetite and became lethargic and unresponsive.  Hypoglycemic as per EMS, treated accordingly.    He has baseline urinary incontinence after his prostatectomy and has a incontinence control device implant which he uses to assist with urination.  Lately, he has been exhibiting more urinary incontinence and told his son the the device stopped working "a while ago."  Only other symptoms were feeling hot flashes in bed last night and dyspneic, as well as 2 days of nausea.  No hx of recent travel, recent illnesses, sick contacts or medication changes.  No hx of CKD as per son.    ED: 36.7, 62, 150/56, 18, 98% RA.  WBC: 14.96, Na: 133, BUN/Cr: 120/8.83, CO2: 21, moderate hematuria.  Lemus inserted, 1.4L urine output.  Received 2L NS bolus.    A goals of care discussion was held with Mendez Dixon (son).  Him and sister are in process of becoming patient's HCPs.  DNR/DNI form signed as result of conversation.      Hospital Course:      Patient was admitted for urinary retention secondary to malfunction urinary assist device causing SAMANTHA and uremia with metabolic encephalopathy. A Lemus catheter was placed to assist drainage. Renal ultrasound showed no pathology of the kidneys. Urology evaluated the patient and wanted to follow up with him on an outpatient basis with the Lemus in place. Overall, he clinically improved with adequate urinary output after the obstruction was relieved. Course complicated by penile meatal bleeding after he tugged on catheter but that had since resolved. PT evaluated the patient and recommended rehab. SAMANTHA resolved.    Patient is stable for discharge with Lemus to be able to go to rehab. He has been instructed to follow up with urology on an outpatient basis. 82M PMH T2DM, Prostate CA s/p prostatectomy (~20 yrs ago, currently cancer free) s/p incontinence control device placement p/w generalized weakness, FTT.  The patient fell last Tuesday (1/24/17) on his stairs and hit his forehead; received full workup at [facility] with no findings.  As per son, Mendez, the pt. had been exhibiting weakness in his extremities in the days leading up to the fall.  He recently developed an difficulty in standing up on his own and started requiring use of a cane with ambulation.  Since the fall, he has had a steep decline in immobility and has become more bed bound, requiring total assistance with ambulation, rising to a standing position and using the bathroom.   He also has been experiencing diffuse myalgia and abdominal discomfort 2/2 constipation, for which he has been taking liberal amounts of Tylenol to control.  The constipation was treated with dulcolax, colace and prune juice and he then began experiencing diarrhea and bowel incontinence.  On morning of presentation, he had complete loss of appetite and became lethargic and unresponsive.  Hypoglycemic as per EMS, treated accordingly.    He has baseline urinary incontinence after his prostatectomy and has a incontinence control device implant which he uses to assist with urination.  Lately, he has been exhibiting more urinary incontinence and told his son the the device stopped working "a while ago."  Only other symptoms were feeling hot flashes in bed last night and dyspneic, as well as 2 days of nausea.  No hx of recent travel, recent illnesses, sick contacts or medication changes.  No hx of CKD as per son.    ED: 36.7, 62, 150/56, 18, 98% RA.  WBC: 14.96, Na: 133, BUN/Cr: 120/8.83, CO2: 21, moderate hematuria.  Lemus inserted, 1.4L urine output.  Received 2L NS bolus.    A goals of care discussion was held with Mendez Dixon (son).  Him and sister are in process of becoming patient's HCPs.  DNR/DNI form signed as result of conversation.      Hospital Course:      Patient was admitted for urinary retention secondary to malfunction urinary assist device causing SAMANTHA and uremia with metabolic encephalopathy. A Lemus catheter was placed to assist drainage. Renal ultrasound showed no pathology of the kidneys. Urology evaluated the patient and wanted to follow up with him on an outpatient basis with the Lemus in place. Overall, he clinically improved with adequate urinary output after the obstruction was relieved. Course complicated by penile meatal bleeding after he tugged on catheter but that had since resolved. PT evaluated the patient and recommended rehab. SAMANTHA resolved on discharge.    Patient is stable for discharge with Lemus to be able to go to rehab. He has been instructed to follow up with urology on an outpatient basis with Dr. King 82M PMH T2DM, Prostate CA s/p prostatectomy (~20 yrs ago, currently cancer free) s/p incontinence control device placement p/w generalized weakness, FTT.  The patient fell last Tuesday (1/24/17) on his stairs and hit his forehead; received full workup at [facility] with no findings.  As per son, Mendez, the pt. had been exhibiting weakness in his extremities in the days leading up to the fall.  He recently developed an difficulty in standing up on his own and started requiring use of a cane with ambulation.  Since the fall, he has had a steep decline in immobility and has become more bed bound, requiring total assistance with ambulation, rising to a standing position and using the bathroom.   He also has been experiencing diffuse myalgia and abdominal discomfort 2/2 constipation, for which he has been taking liberal amounts of Tylenol to control.  The constipation was treated with dulcolax, colace and prune juice and he then began experiencing diarrhea and bowel incontinence.  On morning of presentation, he had complete loss of appetite and became lethargic and unresponsive.  Hypoglycemic as per EMS, treated accordingly.    He has baseline urinary incontinence after his prostatectomy and has a incontinence control device implant which he uses to assist with urination.  Lately, he has been exhibiting more urinary incontinence and told his son the the device stopped working "a while ago."  Only other symptoms were feeling hot flashes in bed last night and dyspneic, as well as 2 days of nausea.  No hx of recent travel, recent illnesses, sick contacts or medication changes.  No hx of CKD as per son.    ED: 36.7, 62, 150/56, 18, 98% RA.  WBC: 14.96, Na: 133, BUN/Cr: 120/8.83, CO2: 21, moderate hematuria.  Lemus inserted, 1.4L urine output.  Received 2L NS bolus.    A goals of care discussion was held with Mendez Dixon (son).  Him and sister are in process of becoming patient's HCPs.  DNR/DNI form signed as result of conversation.      Hospital Course:      Patient was admitted for urinary retention secondary to malfunction urinary assist device causing SAMANTHA and uremia with metabolic encephalopathy. A Lemus catheter was placed to assist drainage. Renal ultrasound showed no pathology of the kidneys. Urology evaluated the patient and wanted to follow up with him on an outpatient basis with the Lemus in place. Overall, he clinically improved with adequate urinary output after the obstruction was relieved. Course complicated by penile meatal bleeding after he tugged on catheter but that had since resolved. PT evaluated the patient and recommended rehab. SAMANTHA resolved on discharge.    Patient is stable for discharge with Lemus to be able to go to rehab. He has been instructed to follow up with urology on an outpatient basis with Dr. King ([phone]) 82M PMH T2DM, Prostate CA s/p prostatectomy (~20 yrs ago, currently cancer free) s/p incontinence control device placement p/w generalized weakness, FTT.  The patient fell last Tuesday (1/24/17) on his stairs and hit his forehead; received full workup at [facility] with no findings.  As per son, Mendez, the pt. had been exhibiting weakness in his extremities in the days leading up to the fall.  He recently developed an difficulty in standing up on his own and started requiring use of a cane with ambulation.  Since the fall, he has had a steep decline in immobility and has become more bed bound, requiring total assistance with ambulation, rising to a standing position and using the bathroom.   He also has been experiencing diffuse myalgia and abdominal discomfort 2/2 constipation, for which he has been taking liberal amounts of Tylenol to control.  The constipation was treated with dulcolax, colace and prune juice and he then began experiencing diarrhea and bowel incontinence.  On morning of presentation, he had complete loss of appetite and became lethargic and unresponsive.  Hypoglycemic as per EMS, treated accordingly.    He has baseline urinary incontinence after his prostatectomy and has a incontinence control device implant which he uses to assist with urination.  Lately, he has been exhibiting more urinary incontinence and told his son the the device stopped working "a while ago."  Only other symptoms were feeling hot flashes in bed last night and dyspneic, as well as 2 days of nausea.  No hx of recent travel, recent illnesses, sick contacts or medication changes.  No hx of CKD as per son.    ED: 36.7, 62, 150/56, 18, 98% RA.  WBC: 14.96, Na: 133, BUN/Cr: 120/8.83, CO2: 21, moderate hematuria.  Lemus inserted, 1.4L urine output.  Received 2L NS bolus.    A goals of care discussion was held with Mendez Dixon (son).  Him and sister are in process of becoming patient's HCPs.  DNR/DNI form signed as result of conversation.      Hospital Course:      Patient was admitted for urinary retention secondary to malfunction urinary assist device causing SAMANTHA and uremia with metabolic encephalopathy. A Lemus catheter was placed to assist drainage. Renal ultrasound showed no pathology of the kidneys. Urology evaluated the patient and wanted to follow up with him on an outpatient basis with the Lemus in place. Overall, he clinically improved with adequate urinary output after the obstruction was relieved. Course complicated by penile meatal bleeding after he tugged on catheter but that had since resolved. PT evaluated the patient and recommended rehab. SAMANTHA resolved on discharge.    Patient is stable for discharge with Lemus to be able to go to rehab. He has been instructed to follow up with urology on an outpatient basis with Dr. King ([phone]) as well as his primary medical doctor Dr. Roberts. 82M PMH T2DM, Prostate CA s/p prostatectomy (~20 yrs ago, currently cancer free) s/p incontinence control device placement p/w generalized weakness, FTT.  The patient fell last Tuesday (1/24/17) on his stairs and hit his forehead; received full workup at [facility] with no findings.  As per son, Mendez, the pt. had been exhibiting weakness in his extremities in the days leading up to the fall.  He recently developed an difficulty in standing up on his own and started requiring use of a cane with ambulation.  Since the fall, he has had a steep decline in immobility and has become more bed bound, requiring total assistance with ambulation, rising to a standing position and using the bathroom.   He also has been experiencing diffuse myalgia and abdominal discomfort 2/2 constipation, for which he has been taking liberal amounts of Tylenol to control.  The constipation was treated with dulcolax, colace and prune juice and he then began experiencing diarrhea and bowel incontinence.  On morning of presentation, he had complete loss of appetite and became lethargic and unresponsive.  Hypoglycemic as per EMS, treated accordingly.    He has baseline urinary incontinence after his prostatectomy and has a incontinence control device implant which he uses to assist with urination.  Lately, he has been exhibiting more urinary incontinence and told his son the the device stopped working "a while ago."  Only other symptoms were feeling hot flashes in bed last night and dyspneic, as well as 2 days of nausea.  No hx of recent travel, recent illnesses, sick contacts or medication changes.  No hx of CKD as per son.    ED: 36.7, 62, 150/56, 18, 98% RA.  WBC: 14.96, Na: 133, BUN/Cr: 120/8.83, CO2: 21, moderate hematuria.  Lemus inserted, 1.4L urine output.  Received 2L NS bolus.    A goals of care discussion was held with Mendez Dixon (son).  Him and sister are in process of becoming patient's HCPs.  DNR/DNI form signed as result of conversation.      Hospital Course:      Patient was admitted for urinary retention secondary to malfunction urinary assist device causing SAMANTHA and uremia with metabolic encephalopathy. A Lemus catheter was placed to assist drainage. Renal ultrasound showed no pathology of the kidneys. Urology evaluated the patient and wanted to follow up with him on an outpatient basis with the Lemus in place. Overall, he clinically improved with adequate urinary output after the obstruction was relieved. Course complicated by penile meatal bleeding after he tugged on catheter but that had since resolved. PT evaluated the patient and recommended rehab. SAMANTHA resolved on discharge.    Patient is stable for discharge with Lemus to be able to go to rehab. He has been instructed to follow up with urology on an outpatient basis with Dr. King ([phone]) as well as his primary medical doctor Dr. Roberts.

## 2017-02-03 NOTE — DISCHARGE NOTE ADULT - SECONDARY DIAGNOSIS.
Generalized weakness Hypertension Type 2 diabetes mellitus without complication, with long-term current use of insulin

## 2017-02-03 NOTE — DISCHARGE NOTE ADULT - CARE PROVIDERS DIRECT ADDRESSES
[email or web address],DirectAddress_Unknown [email or web address],DirectAddress_Unknown,DirectAddress_Unknown

## 2017-02-03 NOTE — DISCHARGE NOTE ADULT - PROVIDER TOKENS
BIANCA:'[id number]' TOKEN:'[id number]',FREE:[LAST:[Roberts],FIRST:[Marco Antonio],PHONE:[phone]],FAX:[(   [phone]    -],ADDRESS:[[address] [place] [address], [address] [place] [address]]]

## 2017-03-08 PROBLEM — Z00.00 ENCOUNTER FOR PREVENTIVE HEALTH EXAMINATION: Noted: 2017-03-08

## 2017-03-15 ENCOUNTER — APPOINTMENT (OUTPATIENT)
Dept: UROLOGY | Facility: CLINIC | Age: 82
End: 2017-03-15

## 2017-03-24 ENCOUNTER — APPOINTMENT (OUTPATIENT)
Dept: UROLOGY | Facility: CLINIC | Age: 82
End: 2017-03-24

## 2017-05-05 ENCOUNTER — APPOINTMENT (OUTPATIENT)
Dept: UROLOGY | Facility: CLINIC | Age: 82
End: 2017-05-05
Payer: MEDICARE

## 2017-05-05 ENCOUNTER — OUTPATIENT (OUTPATIENT)
Dept: OUTPATIENT SERVICES | Facility: HOSPITAL | Age: 82
LOS: 1 days | End: 2017-05-05
Payer: MEDICARE

## 2017-05-05 VITALS
HEART RATE: 70 BPM | DIASTOLIC BLOOD PRESSURE: 79 MMHG | SYSTOLIC BLOOD PRESSURE: 129 MMHG | WEIGHT: 130 LBS | HEIGHT: 66 IN | RESPIRATION RATE: 16 BRPM | BODY MASS INDEX: 20.89 KG/M2 | TEMPERATURE: 97.8 F

## 2017-05-05 DIAGNOSIS — E11.8 TYPE 2 DIABETES MELLITUS WITH UNSPECIFIED COMPLICATIONS: ICD-10-CM

## 2017-05-05 DIAGNOSIS — Z90.79 ACQUIRED ABSENCE OF OTHER GENITAL ORGAN(S): Chronic | ICD-10-CM

## 2017-05-05 DIAGNOSIS — Z85.46 PERSONAL HISTORY OF MALIGNANT NEOPLASM OF PROSTATE: ICD-10-CM

## 2017-05-05 PROCEDURE — 51702 INSERT TEMP BLADDER CATH: CPT

## 2017-05-05 PROCEDURE — 99204 OFFICE O/P NEW MOD 45 MIN: CPT | Mod: 25

## 2017-05-05 RX ORDER — LOSARTAN POTASSIUM 50 MG/1
50 TABLET, FILM COATED ORAL
Refills: 0 | Status: ACTIVE | COMMUNITY

## 2017-05-05 RX ORDER — AMOXICILLIN AND CLAVULANATE POTASSIUM 500; 125 MG/1; 1/1
500-125 TABLET, FILM COATED ORAL
Refills: 0 | Status: ACTIVE | COMMUNITY

## 2017-05-05 RX ORDER — ASPIRIN 81 MG
81 TABLET, DELAYED RELEASE (ENTERIC COATED) ORAL
Refills: 0 | Status: ACTIVE | COMMUNITY

## 2017-05-05 RX ORDER — DOCUSATE SODIUM 100 MG
100 TABLET ORAL
Refills: 0 | Status: ACTIVE | COMMUNITY

## 2017-05-05 RX ORDER — ACETAMINOPHEN 325 MG/1
325 TABLET, FILM COATED ORAL
Refills: 0 | Status: ACTIVE | COMMUNITY

## 2017-05-05 RX ORDER — ATORVASTATIN CALCIUM 10 MG/1
10 TABLET, FILM COATED ORAL
Refills: 0 | Status: ACTIVE | COMMUNITY

## 2017-05-05 RX ORDER — HYDROCHLOROTHIAZIDE 12.5 MG/1
12.5 CAPSULE ORAL
Refills: 0 | Status: ACTIVE | COMMUNITY

## 2017-05-09 LAB — BACTERIA UR CULT: ABNORMAL

## 2017-05-11 DIAGNOSIS — R33.8 OTHER RETENTION OF URINE: ICD-10-CM

## 2017-05-11 DIAGNOSIS — T83.111A BREAKDOWN (MECHANICAL) OF IMPLANTED URINARY SPHINCTER, INITIAL ENCOUNTER: ICD-10-CM

## 2017-05-11 DIAGNOSIS — E11.8 TYPE 2 DIABETES MELLITUS WITH UNSPECIFIED COMPLICATIONS: ICD-10-CM

## 2017-05-11 DIAGNOSIS — Z92.89 PERSONAL HISTORY OF OTHER MEDICAL TREATMENT: ICD-10-CM

## 2017-06-23 ENCOUNTER — APPOINTMENT (OUTPATIENT)
Dept: UROLOGY | Facility: CLINIC | Age: 82
End: 2017-06-23
Payer: MEDICARE

## 2017-06-23 ENCOUNTER — OUTPATIENT (OUTPATIENT)
Dept: OUTPATIENT SERVICES | Facility: HOSPITAL | Age: 82
LOS: 1 days | End: 2017-06-23
Payer: MEDICARE

## 2017-06-23 DIAGNOSIS — R35.0 FREQUENCY OF MICTURITION: ICD-10-CM

## 2017-06-23 DIAGNOSIS — Z90.79 ACQUIRED ABSENCE OF OTHER GENITAL ORGAN(S): Chronic | ICD-10-CM

## 2017-06-23 PROCEDURE — 52000 CYSTOURETHROSCOPY: CPT

## 2017-06-26 ENCOUNTER — APPOINTMENT (OUTPATIENT)
Age: 82
End: 2017-06-26

## 2017-06-26 DIAGNOSIS — T83.111S BREAKDOWN (MECHANICAL) OF IMPLANTED URINARY SPHINCTER, SEQUELA: ICD-10-CM

## 2017-06-26 DIAGNOSIS — R33.8 OTHER RETENTION OF URINE: ICD-10-CM

## 2017-06-26 LAB — BACTERIA UR CULT: ABNORMAL

## 2017-07-03 ENCOUNTER — INPATIENT (INPATIENT)
Facility: HOSPITAL | Age: 82
LOS: 10 days | Discharge: HOME | DRG: 663 | End: 2017-07-14
Attending: HOSPITALIST | Admitting: HOSPITALIST
Payer: MEDICARE

## 2017-07-03 VITALS
SYSTOLIC BLOOD PRESSURE: 134 MMHG | OXYGEN SATURATION: 96 % | WEIGHT: 130.07 LBS | HEART RATE: 57 BPM | HEIGHT: 66 IN | DIASTOLIC BLOOD PRESSURE: 64 MMHG | RESPIRATION RATE: 17 BRPM | TEMPERATURE: 98 F

## 2017-07-03 DIAGNOSIS — Z90.79 ACQUIRED ABSENCE OF OTHER GENITAL ORGAN(S): Chronic | ICD-10-CM

## 2017-07-03 DIAGNOSIS — T83.111A BREAKDOWN (MECHANICAL) OF IMPLANTED URINARY SPHINCTER, INITIAL ENCOUNTER: ICD-10-CM

## 2017-07-03 LAB
ANION GAP SERPL CALC-SCNC: 10 MMOL/L — SIGNIFICANT CHANGE UP (ref 5–17)
APTT BLD: 35.2 SEC — SIGNIFICANT CHANGE UP (ref 27.5–37.4)
BLD GP AB SCN SERPL QL: NEGATIVE — SIGNIFICANT CHANGE UP
BUN SERPL-MCNC: 24 MG/DL — HIGH (ref 7–23)
CALCIUM SERPL-MCNC: 9.7 MG/DL — SIGNIFICANT CHANGE UP (ref 8.4–10.5)
CHLORIDE SERPL-SCNC: 102 MMOL/L — SIGNIFICANT CHANGE UP (ref 96–108)
CO2 SERPL-SCNC: 29 MMOL/L — SIGNIFICANT CHANGE UP (ref 22–31)
CREAT SERPL-MCNC: 1.01 MG/DL — SIGNIFICANT CHANGE UP (ref 0.5–1.3)
GLUCOSE SERPL-MCNC: 140 MG/DL — HIGH (ref 70–99)
HCT VFR BLD CALC: 40.4 % — SIGNIFICANT CHANGE UP (ref 39–50)
HGB BLD-MCNC: 13.9 G/DL — SIGNIFICANT CHANGE UP (ref 13–17)
INR BLD: 1.07 RATIO — SIGNIFICANT CHANGE UP (ref 0.88–1.16)
MCHC RBC-ENTMCNC: 31.4 PG — SIGNIFICANT CHANGE UP (ref 27–34)
MCHC RBC-ENTMCNC: 34.4 GM/DL — SIGNIFICANT CHANGE UP (ref 32–36)
MCV RBC AUTO: 91.5 FL — SIGNIFICANT CHANGE UP (ref 80–100)
PLATELET # BLD AUTO: 196 K/UL — SIGNIFICANT CHANGE UP (ref 150–400)
POTASSIUM SERPL-MCNC: 4 MMOL/L — SIGNIFICANT CHANGE UP (ref 3.5–5.3)
POTASSIUM SERPL-SCNC: 4 MMOL/L — SIGNIFICANT CHANGE UP (ref 3.5–5.3)
PROTHROM AB SERPL-ACNC: 11.7 SEC — SIGNIFICANT CHANGE UP (ref 9.8–12.7)
RBC # BLD: 4.41 M/UL — SIGNIFICANT CHANGE UP (ref 4.2–5.8)
RBC # FLD: 13 % — SIGNIFICANT CHANGE UP (ref 10.3–14.5)
RH IG SCN BLD-IMP: POSITIVE — SIGNIFICANT CHANGE UP
SODIUM SERPL-SCNC: 141 MMOL/L — SIGNIFICANT CHANGE UP (ref 135–145)
WBC # BLD: 8.5 K/UL — SIGNIFICANT CHANGE UP (ref 3.8–10.5)
WBC # FLD AUTO: 8.5 K/UL — SIGNIFICANT CHANGE UP (ref 3.8–10.5)

## 2017-07-03 RX ORDER — INSULIN LISPRO 100/ML
VIAL (ML) SUBCUTANEOUS
Qty: 0 | Refills: 0 | Status: DISCONTINUED | OUTPATIENT
Start: 2017-07-03 | End: 2017-07-05

## 2017-07-03 RX ORDER — INSULIN LISPRO 100/ML
VIAL (ML) SUBCUTANEOUS AT BEDTIME
Qty: 0 | Refills: 0 | Status: DISCONTINUED | OUTPATIENT
Start: 2017-07-03 | End: 2017-07-05

## 2017-07-03 RX ORDER — ATORVASTATIN CALCIUM 80 MG/1
10 TABLET, FILM COATED ORAL AT BEDTIME
Qty: 0 | Refills: 0 | Status: DISCONTINUED | OUTPATIENT
Start: 2017-07-03 | End: 2017-07-05

## 2017-07-03 RX ORDER — LOSARTAN POTASSIUM 100 MG/1
50 TABLET, FILM COATED ORAL DAILY
Qty: 0 | Refills: 0 | Status: DISCONTINUED | OUTPATIENT
Start: 2017-07-03 | End: 2017-07-05

## 2017-07-03 RX ORDER — VANCOMYCIN HCL 1 G
VIAL (EA) INTRAVENOUS
Qty: 0 | Refills: 0 | Status: DISCONTINUED | OUTPATIENT
Start: 2017-07-03 | End: 2017-07-05

## 2017-07-03 RX ORDER — HEPARIN SODIUM 5000 [USP'U]/ML
5000 INJECTION INTRAVENOUS; SUBCUTANEOUS EVERY 8 HOURS
Qty: 0 | Refills: 0 | Status: DISCONTINUED | OUTPATIENT
Start: 2017-07-03 | End: 2017-07-05

## 2017-07-03 RX ORDER — VANCOMYCIN HCL 1 G
1000 VIAL (EA) INTRAVENOUS EVERY 12 HOURS
Qty: 0 | Refills: 0 | Status: DISCONTINUED | OUTPATIENT
Start: 2017-07-04 | End: 2017-07-05

## 2017-07-03 RX ORDER — HYDROCHLOROTHIAZIDE 25 MG
12.5 TABLET ORAL DAILY
Qty: 0 | Refills: 0 | Status: DISCONTINUED | OUTPATIENT
Start: 2017-07-03 | End: 2017-07-05

## 2017-07-03 RX ORDER — SODIUM CHLORIDE 9 MG/ML
3 INJECTION INTRAMUSCULAR; INTRAVENOUS; SUBCUTANEOUS EVERY 8 HOURS
Qty: 0 | Refills: 0 | Status: DISCONTINUED | OUTPATIENT
Start: 2017-07-03 | End: 2017-07-05

## 2017-07-03 RX ORDER — VANCOMYCIN HCL 1 G
1000 VIAL (EA) INTRAVENOUS EVERY 12 HOURS
Qty: 0 | Refills: 0 | Status: DISCONTINUED | OUTPATIENT
Start: 2017-07-03 | End: 2017-07-03

## 2017-07-03 RX ORDER — ASPIRIN/CALCIUM CARB/MAGNESIUM 324 MG
81 TABLET ORAL DAILY
Qty: 0 | Refills: 0 | Status: DISCONTINUED | OUTPATIENT
Start: 2017-07-03 | End: 2017-07-05

## 2017-07-03 RX ORDER — VANCOMYCIN HCL 1 G
1000 VIAL (EA) INTRAVENOUS ONCE
Qty: 0 | Refills: 0 | Status: COMPLETED | OUTPATIENT
Start: 2017-07-03 | End: 2017-07-03

## 2017-07-03 RX ORDER — PIPERACILLIN AND TAZOBACTAM 4; .5 G/20ML; G/20ML
3.38 INJECTION, POWDER, LYOPHILIZED, FOR SOLUTION INTRAVENOUS ONCE
Qty: 0 | Refills: 0 | Status: COMPLETED | OUTPATIENT
Start: 2017-07-03 | End: 2017-07-03

## 2017-07-03 RX ORDER — DOCUSATE SODIUM 100 MG
100 CAPSULE ORAL
Qty: 0 | Refills: 0 | Status: DISCONTINUED | OUTPATIENT
Start: 2017-07-03 | End: 2017-07-05

## 2017-07-03 RX ORDER — PIPERACILLIN AND TAZOBACTAM 4; .5 G/20ML; G/20ML
3.38 INJECTION, POWDER, LYOPHILIZED, FOR SOLUTION INTRAVENOUS EVERY 8 HOURS
Qty: 0 | Refills: 0 | Status: DISCONTINUED | OUTPATIENT
Start: 2017-07-03 | End: 2017-07-05

## 2017-07-03 RX ADMIN — SODIUM CHLORIDE 3 MILLILITER(S): 9 INJECTION INTRAMUSCULAR; INTRAVENOUS; SUBCUTANEOUS at 20:54

## 2017-07-03 RX ADMIN — Medication 250 MILLIGRAM(S): at 21:12

## 2017-07-03 RX ADMIN — Medication 100 MILLIGRAM(S): at 18:10

## 2017-07-03 RX ADMIN — HEPARIN SODIUM 5000 UNIT(S): 5000 INJECTION INTRAVENOUS; SUBCUTANEOUS at 21:12

## 2017-07-03 RX ADMIN — ATORVASTATIN CALCIUM 10 MILLIGRAM(S): 80 TABLET, FILM COATED ORAL at 21:12

## 2017-07-03 RX ADMIN — PIPERACILLIN AND TAZOBACTAM 200 GRAM(S): 4; .5 INJECTION, POWDER, LYOPHILIZED, FOR SOLUTION INTRAVENOUS at 18:09

## 2017-07-03 NOTE — H&P ADULT - NSHPPHYSICALEXAM_GEN_ALL_CORE
Gen: NAD  : monte in place, draining clear yellow urine, uncircumcised penis w/ventral erosion; AUS palpable, testes descended bilaterally, nontender

## 2017-07-03 NOTE — H&P ADULT - HISTORY OF PRESENT ILLNESS
Patient is a 83 yo M p/w preoperative Abx for removal of eroded AUS. Pt has h/o prostate cancer s/p RRP in 1998. He then had AUS placed for urinary incontinence in 1999. Subsequently did fine until roughly 1 yr ago when he developed worsened incontinence and did not feel the AUS was functioning properly.    Cystoscopy on 6/23 revealed erosion of the AUS cuff to the anterior urethra at level of bulbar urethra.

## 2017-07-03 NOTE — H&P ADULT - ASSESSMENT
Patient is a 81 yo M p/w preoperative Abx for removal of eroded AUS  -- CBC/BMP/type and screen  -- start vanc/zosyn  -- pre-op/consent  -- OR planning Wed

## 2017-07-03 NOTE — H&P ADULT - ATTENDING COMMENTS
Pt seen and examined with resident team. Covering for Dr King today. Pt pre-admitted for IV abx prior to planned explant tomorrow.

## 2017-07-04 DIAGNOSIS — I44.0 ATRIOVENTRICULAR BLOCK, FIRST DEGREE: ICD-10-CM

## 2017-07-04 DIAGNOSIS — N36.8 OTHER SPECIFIED DISORDERS OF URETHRA: ICD-10-CM

## 2017-07-04 LAB
ANION GAP SERPL CALC-SCNC: 10 MMOL/L — SIGNIFICANT CHANGE UP (ref 5–17)
BASOPHILS # BLD AUTO: 0.1 K/UL — SIGNIFICANT CHANGE UP (ref 0–0.2)
BASOPHILS NFR BLD AUTO: 0.7 % — SIGNIFICANT CHANGE UP (ref 0–2)
BUN SERPL-MCNC: 20 MG/DL — SIGNIFICANT CHANGE UP (ref 7–23)
CALCIUM SERPL-MCNC: 9 MG/DL — SIGNIFICANT CHANGE UP (ref 8.4–10.5)
CHLORIDE SERPL-SCNC: 103 MMOL/L — SIGNIFICANT CHANGE UP (ref 96–108)
CO2 SERPL-SCNC: 27 MMOL/L — SIGNIFICANT CHANGE UP (ref 22–31)
CREAT SERPL-MCNC: 1.06 MG/DL — SIGNIFICANT CHANGE UP (ref 0.5–1.3)
EOSINOPHIL # BLD AUTO: 0.3 K/UL — SIGNIFICANT CHANGE UP (ref 0–0.5)
EOSINOPHIL NFR BLD AUTO: 4.4 % — SIGNIFICANT CHANGE UP (ref 0–6)
GLUCOSE SERPL-MCNC: 108 MG/DL — HIGH (ref 70–99)
HBA1C BLD-MCNC: 6.3 % — HIGH (ref 4–5.6)
HCT VFR BLD CALC: 40.1 % — SIGNIFICANT CHANGE UP (ref 39–50)
HGB BLD-MCNC: 13.9 G/DL — SIGNIFICANT CHANGE UP (ref 13–17)
LYMPHOCYTES # BLD AUTO: 3.2 K/UL — SIGNIFICANT CHANGE UP (ref 1–3.3)
LYMPHOCYTES # BLD AUTO: 44.4 % — HIGH (ref 13–44)
MCHC RBC-ENTMCNC: 31.6 PG — SIGNIFICANT CHANGE UP (ref 27–34)
MCHC RBC-ENTMCNC: 34.7 GM/DL — SIGNIFICANT CHANGE UP (ref 32–36)
MCV RBC AUTO: 91.2 FL — SIGNIFICANT CHANGE UP (ref 80–100)
MONOCYTES # BLD AUTO: 0.7 K/UL — SIGNIFICANT CHANGE UP (ref 0–0.9)
MONOCYTES NFR BLD AUTO: 9.9 % — SIGNIFICANT CHANGE UP (ref 2–14)
NEUTROPHILS # BLD AUTO: 2.9 K/UL — SIGNIFICANT CHANGE UP (ref 1.8–7.4)
NEUTROPHILS NFR BLD AUTO: 40.5 % — LOW (ref 43–77)
PLATELET # BLD AUTO: 188 K/UL — SIGNIFICANT CHANGE UP (ref 150–400)
POTASSIUM SERPL-MCNC: 3.7 MMOL/L — SIGNIFICANT CHANGE UP (ref 3.5–5.3)
POTASSIUM SERPL-SCNC: 3.7 MMOL/L — SIGNIFICANT CHANGE UP (ref 3.5–5.3)
RBC # BLD: 4.4 M/UL — SIGNIFICANT CHANGE UP (ref 4.2–5.8)
RBC # FLD: 13.2 % — SIGNIFICANT CHANGE UP (ref 10.3–14.5)
SODIUM SERPL-SCNC: 140 MMOL/L — SIGNIFICANT CHANGE UP (ref 135–145)
WBC # BLD: 7.3 K/UL — SIGNIFICANT CHANGE UP (ref 3.8–10.5)
WBC # FLD AUTO: 7.3 K/UL — SIGNIFICANT CHANGE UP (ref 3.8–10.5)

## 2017-07-04 PROCEDURE — 99222 1ST HOSP IP/OBS MODERATE 55: CPT

## 2017-07-04 PROCEDURE — 93010 ELECTROCARDIOGRAM REPORT: CPT

## 2017-07-04 PROCEDURE — 71010: CPT | Mod: 26

## 2017-07-04 PROCEDURE — 99223 1ST HOSP IP/OBS HIGH 75: CPT

## 2017-07-04 RX ORDER — SODIUM CHLORIDE 9 MG/ML
1000 INJECTION, SOLUTION INTRAVENOUS
Qty: 0 | Refills: 0 | Status: DISCONTINUED | OUTPATIENT
Start: 2017-07-04 | End: 2017-07-05

## 2017-07-04 RX ADMIN — SODIUM CHLORIDE 3 MILLILITER(S): 9 INJECTION INTRAMUSCULAR; INTRAVENOUS; SUBCUTANEOUS at 22:52

## 2017-07-04 RX ADMIN — ATORVASTATIN CALCIUM 10 MILLIGRAM(S): 80 TABLET, FILM COATED ORAL at 21:59

## 2017-07-04 RX ADMIN — Medication 100 MILLIGRAM(S): at 18:44

## 2017-07-04 RX ADMIN — SODIUM CHLORIDE 3 MILLILITER(S): 9 INJECTION INTRAMUSCULAR; INTRAVENOUS; SUBCUTANEOUS at 13:36

## 2017-07-04 RX ADMIN — Medication 100 MILLIGRAM(S): at 05:47

## 2017-07-04 RX ADMIN — PIPERACILLIN AND TAZOBACTAM 25 GRAM(S): 4; .5 INJECTION, POWDER, LYOPHILIZED, FOR SOLUTION INTRAVENOUS at 13:37

## 2017-07-04 RX ADMIN — HEPARIN SODIUM 5000 UNIT(S): 5000 INJECTION INTRAVENOUS; SUBCUTANEOUS at 21:59

## 2017-07-04 RX ADMIN — SODIUM CHLORIDE 3 MILLILITER(S): 9 INJECTION INTRAMUSCULAR; INTRAVENOUS; SUBCUTANEOUS at 05:41

## 2017-07-04 RX ADMIN — HEPARIN SODIUM 5000 UNIT(S): 5000 INJECTION INTRAVENOUS; SUBCUTANEOUS at 05:47

## 2017-07-04 RX ADMIN — Medication 81 MILLIGRAM(S): at 11:54

## 2017-07-04 RX ADMIN — HEPARIN SODIUM 5000 UNIT(S): 5000 INJECTION INTRAVENOUS; SUBCUTANEOUS at 13:39

## 2017-07-04 RX ADMIN — Medication 250 MILLIGRAM(S): at 09:32

## 2017-07-04 RX ADMIN — PIPERACILLIN AND TAZOBACTAM 25 GRAM(S): 4; .5 INJECTION, POWDER, LYOPHILIZED, FOR SOLUTION INTRAVENOUS at 02:10

## 2017-07-04 RX ADMIN — Medication 250 MILLIGRAM(S): at 20:21

## 2017-07-04 RX ADMIN — PIPERACILLIN AND TAZOBACTAM 25 GRAM(S): 4; .5 INJECTION, POWDER, LYOPHILIZED, FOR SOLUTION INTRAVENOUS at 21:59

## 2017-07-04 NOTE — PROGRESS NOTE ADULT - SUBJECTIVE AND OBJECTIVE BOX
UROLOGY DAILY PROGRESS NOTE:     Subjective: Patient seen and examined at bedside. No overnight events.       Objective:  Vital signs  T(F): , Max: 98.5 (07-03-17 @ 22:24)  HR: 58 (07-04-17 @ 05:45)  BP: 125/63 (07-04-17 @ 05:45)  SpO2: 100% (07-04-17 @ 05:45)  Wt(kg): --    I&O's Summary    03 Jul 2017 07:01  -  04 Jul 2017 07:00  --------------------------------------------------------  IN: 220 mL / OUT: 1150 mL / NET: -930 mL    I&O's Detail    03 Jul 2017 07:01  -  04 Jul 2017 07:00  --------------------------------------------------------  IN:    IV PiggyBack: 100 mL    Oral Fluid: 120 mL  Total IN: 220 mL    OUT:    Indwelling Catheter - Urethral: 1150 mL  Total OUT: 1150 mL    Total NET: -930 mL          Gen: NAD  Pulm: No respiratory distress, no subcostal retractions  CV: RRR, no JVD  Abd: Soft, NT, ND  : Lemus--> clear urine    Labs:    07-03    8.5   / 40.4  /1.01                           13.9   8.5   )-----------( 196      ( 03 Jul 2017 19:29 )             40.4     07-03    141  |  102  |  24<H>  ----------------------------<  140<H>  4.0   |  29  |  1.01    Ca    9.7      03 Jul 2017 19:29      PT/INR - ( 03 Jul 2017 19:29 )   PT: 11.7 sec;   INR: 1.07 ratio         PTT - ( 03 Jul 2017 19:29 )  PTT:35.2 sec    Urine Cx:

## 2017-07-04 NOTE — CONSULT NOTE ADULT - ASSESSMENT
84 YO m w/ AUS erosion needing intervention. Also noted to be in [place] w/ 1 degree AV block 55-65.

## 2017-07-04 NOTE — CONSULT NOTE ADULT - PROBLEM SELECTOR RECOMMENDATION 9
- Obtain ECHO  - - Obtain ECHO  - Pt cleared by cardiology pending cardiology attending assessment  - May reduce cozaar to 25 mg to maintain HR > 60  - Check TSH - Obtain ECHO  - Pt cleared for op pending echo and cards attending eval.   - May reduce cozaar to 25 mg to maintain HR > 60  - Check TSH

## 2017-07-04 NOTE — CONSULT NOTE ADULT - SUBJECTIVE AND OBJECTIVE BOX
Patient is a 82y old  Male who presents with a chief complaint of pre-op Abx, eroded AUS (2017 17:12)      HPI:  Patient is a 81 yo M p/w preoperative Abx for removal of eroded AUS. Pt has h/o prostate cancer s/p RRP in . He then had AUS placed for urinary incontinence in . Subsequently did fine until roughly 1 yr ago when he developed worsened incontinence and did not feel the AUS was functioning properly.    Cystoscopy on  revealed erosion of the AUS cuff to the anterior urethra at level of bulbar urethra. (2017 17:12)      PAST MEDICAL & SURGICAL HISTORY:  Prostate CA  Diabetes mellitus  Urinary incontinence  H/O prostatectomy      PREVIOUS DIAGNOSTIC TESTING::     HOME MEDICATIONS :     MEDICATIONS  (STANDING):  aspirin enteric coated 81 milliGRAM(s) Oral daily  losartan 50 milliGRAM(s) Oral daily  atorvastatin 10 milliGRAM(s) Oral at bedtime  hydrochlorothiazide 12.5 milliGRAM(s) Oral daily  docusate sodium 100 milliGRAM(s) Oral two times a day  sodium chloride 0.9% lock flush 3 milliLiter(s) IV Push every 8 hours  insulin lispro (HumaLOG) corrective regimen sliding scale   SubCutaneous three times a day before meals  insulin lispro (HumaLOG) corrective regimen sliding scale   SubCutaneous at bedtime  heparin  Injectable 5000 Unit(s) SubCutaneous every 8 hours  piperacillin/tazobactam IVPB. 3.375 Gram(s) IV Intermittent every 8 hours  vancomycin  IVPB   IV Intermittent   vancomycin  IVPB 1000 milliGRAM(s) IV Intermittent every 12 hours  lactated ringers. 1000 milliLiter(s) (125 mL/Hr) IV Continuous <Continuous>    MEDICATIONS  (PRN):      FAMILY HISTORY:  No pertinent family history in first degree relatives      Social History:    Marital Status:   Occupation:   Lives with:     Substance Use :  Tobacco Usage:  (   ) never smoked   (   ) former smoker   (   ) current smoker  (     ) pack year  (        ) last tobacco use date  Alcohol Usage:      Health Management     For female:   Last Mammo:   Last Pap:     For male:  Last prostate exam:          [  ] date:            (  ) findings      Immunization Hx:   (  ) flu shot                               (     ) date   (  ) pneumonia shot               (     ) date  (  ) tetanus                               (     ) date     (     ) Advanced Directives: (     ) declined   [  ] health care proxy    Allergies    No Known Allergies    Intolerances        REVIEW OF SYSTEMS      General:	    Skin/Breast:  	  Ophthalmologic:  	  ENMT:	    Respiratory and Thorax:  	  Cardiovascular:	    Gastrointestinal:	    Genitourinary:	    Musculoskeletal:	    Neurological:	    Psychiatric:	    Hematology/Lymphatics:	    Endocrine:	    Allergic/Immunologic:	  All others negative.    Vital Signs Last 24 Hrs  T(C): 36.7 (2017 21:49), Max: 36.7 (2017 10:26)  T(F): 98.1 (2017 21:49), Max: 98.1 (2017 21:49)  HR: 50 (2017 21:49) (48 - 65)  BP: 156/60 (2017 21:49) (100/56 - 156/60)  BP(mean): --  RR: 16 (2017 21:49) (16 - 18)  SpO2: 98% (2017 21:49) (94% - 100%)  ICU Vital Signs Last 24 Hrs  T(C): 36.7 (2017 21:49), Max: 36.7 (2017 10:26)  T(F): 98.1 (2017 21:49), Max: 98.1 (2017 21:49)  HR: 50 (2017 21:49) (48 - 65)  BP: 156/60 (2017 21:49) (100/56 - 156/60)  BP(mean): --  ABP: --  ABP(mean): --  RR: 16 (2017 21:49) (16 - 18)  SpO2: 98% (2017 21:49) (94% - 100%)    I&O's Summary    2017 07:  -  2017 07:00  --------------------------------------------------------  IN: 220 mL / OUT: 1150 mL / NET: -930 mL    2017 07:  -  2017 23:18  --------------------------------------------------------  IN: 1210 mL / OUT: 1500 mL / NET: -290 mL      Daily     Daily     PHYSICAL EXAM:      Constitutional:    Eyes:    ENMT:    Neck:    Breasts:    Back:    Respiratory:    Cardiovascular:    Gastrointestinal:    Genitourinary:    Rectal:    Extremities:    Vascular:    Neurological:    Skin:    Lymph Nodes:    Musculoskeletal:    Psychiatric:        TELEMETRY:     EKG:     CARDIAC CATH:     ECHO:    IMAGIN.9   7.3   )-----------( 188      ( 2017 07:06 )             40.1     07-04    140  |  103  |  20  ----------------------------<  108<H>  3.7   |  27  |  1.06    Ca    9.0      2017 07:06          PT/INR - ( 2017 19:29 )   PT: 11.7 sec;   INR: 1.07 ratio         PTT - ( 2017 19:29 )  PTT:35.2 sec    *BLOOD GAS/ARTERIAL/MIXED/VENOUS  *LACTATE      HEALTH ISSUES - PROBLEM Dx:  Erosion of urethra: Erosion of urethra        HEALTH ISSUES - R/O PROBLEM Dx: No Patient is a 82y old  Male who presents with a chief complaint of pre-op Abx, eroded AUS (03 Jul 2017 17:12)      HPI:  83 yo M w/ pmh of HTN, BPH who p/w preoperative Abx for removal of eroded AUS. Pt has h/o prostate cancer s/p RRP in 1998. He then had AUS placed for urinary incontinence in 1999. Subsequently did fine until roughly 1 yr ago when he developed worsened incontinence and did not feel the AUS was functioning properly.  Cystoscopy on 6/23 revealed erosion of the AUS cuff to the anterior urethra at level of bulbar urethra. (03 Jul 2017 17:12).     Upon presentation, pt noted to be sinus javid w/ 1st degree AV block. Pt does not recall being told he has a low heart rate. Pt denies history of dizziness, chest pains, shortness of breath, N/V/D, lower extremity numbness or weakness. Pt denies history of cardiac disease and does not remember having ECHO , stress test, and denies cardiac cath. Pt normally able to walk flights of stairs without problems. Pt states he lays flat in bed without symptoms of palpitations, chest pain, shortness of breath.       PAST MEDICAL & SURGICAL HISTORY:  Prostate CA  Diabetes mellitus  Urinary incontinence  H/O prostatectomy      PREVIOUS DIAGNOSTIC TESTING:: unknown    HOME MEDICATIONS :     MEDICATIONS  (STANDING):  aspirin enteric coated 81 milliGRAM(s) Oral daily  losartan 50 milliGRAM(s) Oral daily  atorvastatin 10 milliGRAM(s) Oral at bedtime  hydrochlorothiazide 12.5 milliGRAM(s) Oral daily  docusate sodium 100 milliGRAM(s) Oral two times a day  sodium chloride 0.9% lock flush 3 milliLiter(s) IV Push every 8 hours  insulin lispro (HumaLOG) corrective regimen sliding scale   SubCutaneous three times a day before meals  insulin lispro (HumaLOG) corrective regimen sliding scale   SubCutaneous at bedtime  heparin  Injectable 5000 Unit(s) SubCutaneous every 8 hours  piperacillin/tazobactam IVPB. 3.375 Gram(s) IV Intermittent every 8 hours  vancomycin  IVPB   IV Intermittent   vancomycin  IVPB 1000 milliGRAM(s) IV Intermittent every 12 hours  lactated ringers. 1000 milliLiter(s) (125 mL/Hr) IV Continuous <Continuous>    MEDICATIONS  (PRN):      FAMILY HISTORY:  No pertinent family history in first degree relatives    Allergies    No Known Allergies    Intolerances      PHYSICAL EXAM    General:	well appearing male in NAD      Respiratory and Thorax: CTA B/L  	  Cardiovascular:	S1S2    Gastrointestinal:	 abdomen soft, NT, ND		    Allergic/Immunologic:	  All others negative.    Vital Signs Last 24 Hrs  T(C): 36.7 (04 Jul 2017 21:49), Max: 36.7 (04 Jul 2017 10:26)  T(F): 98.1 (04 Jul 2017 21:49), Max: 98.1 (04 Jul 2017 21:49)  HR: 50 (04 Jul 2017 21:49) (48 - 65)  BP: 156/60 (04 Jul 2017 21:49) (100/56 - 156/60)  RR: 16 (04 Jul 2017 21:49) (16 - 18)  SpO2: 98% (04 Jul 2017 21:49) (94% - 100%)  ICU Vital Signs Last 24 Hrs  T(C): 36.7 (04 Jul 2017 21:49), Max: 36.7 (04 Jul 2017 10:26)  T(F): 98.1 (04 Jul 2017 21:49), Max: 98.1 (04 Jul 2017 21:49)  HR: 50 (04 Jul 2017 21:49) (48 - 65)  BP: 156/60 (04 Jul 2017 21:49) (100/56 - 156/60)  RR: 16 (04 Jul 2017 21:49) (16 - 18)  SpO2: 98% (04 Jul 2017 21:49) (94% - 100%)    I&O's Summary    03 Jul 2017 07:01  -  04 Jul 2017 07:00  --------------------------------------------------------  IN: 220 mL / OUT: 1150 mL / NET: -930 mL    04 Jul 2017 07:01  -  04 Jul 2017 23:18  --------------------------------------------------------  IN: 1210 mL / OUT: 1500 mL / NET: -290 mL                          13.9   7.3   )-----------( 188      ( 04 Jul 2017 07:06 )             40.1     07-04    140  |  103  |  20  ----------------------------<  108<H>  3.7   |  27  |  1.06    Ca    9.0      04 Jul 2017 07:06    PT/INR - ( 03 Jul 2017 19:29 )   PT: 11.7 sec;   INR: 1.07 ratio         PTT - ( 03 Jul 2017 19:29 )  PTT:35.2 sec

## 2017-07-04 NOTE — PROGRESS NOTE ADULT - SUBJECTIVE AND OBJECTIVE BOX
Urology Preop Note    Diagnosis: Eroded AUS  Procedure: Removal of AUS  Surgeon: Dr. King                          13.9   7.3   )-----------( 188      ( 04 Jul 2017 07:06 )             40.1       07-04    140  |  103  |  20  ----------------------------<  108<H>  3.7   |  27  |  1.06    Ca    9.0      04 Jul 2017 07:06        PT/INR - ( 03 Jul 2017 19:29 )   PT: 11.7 sec;   INR: 1.07 ratio         PTT - ( 03 Jul 2017 19:29 )  PTT:35.2 sec      UCx: 6/23: <50k S. aureus    EKG: Sinus Bradycardia with 1st degree AV block  CXR: pending    A/P:  82 y.o. M with eroded AUS  -NPO after midnight  -IVF  -Consent obtained, in chart  -Type & Screen  - f/u official CXR read  - TTE ordered as per cardiology recs, f/u official read  - f/u official cardiology consult

## 2017-07-05 ENCOUNTER — TRANSCRIPTION ENCOUNTER (OUTPATIENT)
Age: 82
End: 2017-07-05

## 2017-07-05 ENCOUNTER — RESULT REVIEW (OUTPATIENT)
Age: 82
End: 2017-07-05

## 2017-07-05 ENCOUNTER — APPOINTMENT (OUTPATIENT)
Dept: UROLOGY | Facility: HOSPITAL | Age: 82
End: 2017-07-05

## 2017-07-05 DIAGNOSIS — E11.9 TYPE 2 DIABETES MELLITUS WITHOUT COMPLICATIONS: ICD-10-CM

## 2017-07-05 LAB
ANION GAP SERPL CALC-SCNC: 16 MMOL/L — SIGNIFICANT CHANGE UP (ref 5–17)
BUN SERPL-MCNC: 14 MG/DL — SIGNIFICANT CHANGE UP (ref 7–23)
CALCIUM SERPL-MCNC: 8.5 MG/DL — SIGNIFICANT CHANGE UP (ref 8.4–10.5)
CHLORIDE SERPL-SCNC: 103 MMOL/L — SIGNIFICANT CHANGE UP (ref 96–108)
CO2 SERPL-SCNC: 24 MMOL/L — SIGNIFICANT CHANGE UP (ref 22–31)
CREAT SERPL-MCNC: 1.11 MG/DL — SIGNIFICANT CHANGE UP (ref 0.5–1.3)
GLUCOSE SERPL-MCNC: 135 MG/DL — HIGH (ref 70–99)
POTASSIUM SERPL-MCNC: 3.9 MMOL/L — SIGNIFICANT CHANGE UP (ref 3.5–5.3)
POTASSIUM SERPL-SCNC: 3.9 MMOL/L — SIGNIFICANT CHANGE UP (ref 3.5–5.3)
SODIUM SERPL-SCNC: 143 MMOL/L — SIGNIFICANT CHANGE UP (ref 135–145)
TSH SERPL-MCNC: 2.29 UIU/ML — SIGNIFICANT CHANGE UP (ref 0.27–4.2)
VANCOMYCIN TROUGH SERPL-MCNC: 16.8 UG/ML — SIGNIFICANT CHANGE UP (ref 10–20)

## 2017-07-05 PROCEDURE — 53446 REMOVE URO SPHINCTER: CPT

## 2017-07-05 PROCEDURE — 88300 SURGICAL PATH GROSS: CPT | Mod: 26

## 2017-07-05 PROCEDURE — 93306 TTE W/DOPPLER COMPLETE: CPT | Mod: 26

## 2017-07-05 RX ORDER — ONDANSETRON 8 MG/1
4 TABLET, FILM COATED ORAL ONCE
Qty: 0 | Refills: 0 | Status: DISCONTINUED | OUTPATIENT
Start: 2017-07-05 | End: 2017-07-06

## 2017-07-05 RX ORDER — HYDROMORPHONE HYDROCHLORIDE 2 MG/ML
0.25 INJECTION INTRAMUSCULAR; INTRAVENOUS; SUBCUTANEOUS
Qty: 0 | Refills: 0 | Status: DISCONTINUED | OUTPATIENT
Start: 2017-07-05 | End: 2017-07-06

## 2017-07-05 RX ORDER — LOSARTAN POTASSIUM 100 MG/1
25 TABLET, FILM COATED ORAL DAILY
Qty: 0 | Refills: 0 | Status: DISCONTINUED | OUTPATIENT
Start: 2017-07-05 | End: 2017-07-05

## 2017-07-05 RX ORDER — INSULIN LISPRO 100/ML
VIAL (ML) SUBCUTANEOUS
Qty: 0 | Refills: 0 | Status: DISCONTINUED | OUTPATIENT
Start: 2017-07-05 | End: 2017-07-11

## 2017-07-05 RX ORDER — SODIUM CHLORIDE 9 MG/ML
3 INJECTION INTRAMUSCULAR; INTRAVENOUS; SUBCUTANEOUS EVERY 8 HOURS
Qty: 0 | Refills: 0 | Status: DISCONTINUED | OUTPATIENT
Start: 2017-07-05 | End: 2017-07-14

## 2017-07-05 RX ORDER — ATORVASTATIN CALCIUM 80 MG/1
10 TABLET, FILM COATED ORAL AT BEDTIME
Qty: 0 | Refills: 0 | Status: DISCONTINUED | OUTPATIENT
Start: 2017-07-05 | End: 2017-07-14

## 2017-07-05 RX ORDER — HYDROCHLOROTHIAZIDE 25 MG
12.5 TABLET ORAL DAILY
Qty: 0 | Refills: 0 | Status: DISCONTINUED | OUTPATIENT
Start: 2017-07-05 | End: 2017-07-14

## 2017-07-05 RX ORDER — SODIUM CHLORIDE 9 MG/ML
1000 INJECTION, SOLUTION INTRAVENOUS
Qty: 0 | Refills: 0 | Status: DISCONTINUED | OUTPATIENT
Start: 2017-07-05 | End: 2017-07-06

## 2017-07-05 RX ORDER — ASPIRIN/CALCIUM CARB/MAGNESIUM 324 MG
81 TABLET ORAL DAILY
Qty: 0 | Refills: 0 | Status: DISCONTINUED | OUTPATIENT
Start: 2017-07-05 | End: 2017-07-14

## 2017-07-05 RX ORDER — HEPARIN SODIUM 5000 [USP'U]/ML
5000 INJECTION INTRAVENOUS; SUBCUTANEOUS EVERY 8 HOURS
Qty: 0 | Refills: 0 | Status: DISCONTINUED | OUTPATIENT
Start: 2017-07-05 | End: 2017-07-14

## 2017-07-05 RX ORDER — DOCUSATE SODIUM 100 MG
100 CAPSULE ORAL
Qty: 0 | Refills: 0 | Status: DISCONTINUED | OUTPATIENT
Start: 2017-07-05 | End: 2017-07-14

## 2017-07-05 RX ORDER — INSULIN LISPRO 100/ML
VIAL (ML) SUBCUTANEOUS AT BEDTIME
Qty: 0 | Refills: 0 | Status: DISCONTINUED | OUTPATIENT
Start: 2017-07-05 | End: 2017-07-11

## 2017-07-05 RX ORDER — LOSARTAN POTASSIUM 100 MG/1
25 TABLET, FILM COATED ORAL DAILY
Qty: 0 | Refills: 0 | Status: DISCONTINUED | OUTPATIENT
Start: 2017-07-05 | End: 2017-07-14

## 2017-07-05 RX ADMIN — SODIUM CHLORIDE 3 MILLILITER(S): 9 INJECTION INTRAMUSCULAR; INTRAVENOUS; SUBCUTANEOUS at 22:16

## 2017-07-05 RX ADMIN — Medication 250 MILLIGRAM(S): at 09:20

## 2017-07-05 RX ADMIN — SODIUM CHLORIDE 3 MILLILITER(S): 9 INJECTION INTRAMUSCULAR; INTRAVENOUS; SUBCUTANEOUS at 05:17

## 2017-07-05 RX ADMIN — HEPARIN SODIUM 5000 UNIT(S): 5000 INJECTION INTRAVENOUS; SUBCUTANEOUS at 22:16

## 2017-07-05 RX ADMIN — PIPERACILLIN AND TAZOBACTAM 25 GRAM(S): 4; .5 INJECTION, POWDER, LYOPHILIZED, FOR SOLUTION INTRAVENOUS at 05:14

## 2017-07-05 RX ADMIN — SODIUM CHLORIDE 125 MILLILITER(S): 9 INJECTION, SOLUTION INTRAVENOUS at 00:29

## 2017-07-05 RX ADMIN — Medication 100 MILLIGRAM(S): at 05:15

## 2017-07-05 RX ADMIN — SODIUM CHLORIDE 75 MILLILITER(S): 9 INJECTION, SOLUTION INTRAVENOUS at 20:12

## 2017-07-05 RX ADMIN — SODIUM CHLORIDE 3 MILLILITER(S): 9 INJECTION INTRAMUSCULAR; INTRAVENOUS; SUBCUTANEOUS at 14:55

## 2017-07-05 RX ADMIN — ATORVASTATIN CALCIUM 10 MILLIGRAM(S): 80 TABLET, FILM COATED ORAL at 22:16

## 2017-07-05 RX ADMIN — SODIUM CHLORIDE 75 MILLILITER(S): 9 INJECTION, SOLUTION INTRAVENOUS at 07:01

## 2017-07-05 NOTE — PROGRESS NOTE ADULT - SUBJECTIVE AND OBJECTIVE BOX
UROLOGY DAILY PROGRESS NOTE:     Subjective: Patient seen and examined at bedside.       Objective:  Vital signs  T(F): , Max: 98.1 (07-04-17 @ 21:49)  HR: 51 (07-05-17 @ 05:10)  BP: 119/66 (07-05-17 @ 05:10)  SpO2: 97% (07-05-17 @ 05:10)  Wt(kg): --    Output     I&O's Detail    03 Jul 2017 07:01  -  04 Jul 2017 07:00  --------------------------------------------------------  IN:    Oral Fluid: 120 mL    Solution: 100 mL  Total IN: 220 mL    OUT:    Indwelling Catheter - Urethral: 1150 mL  Total OUT: 1150 mL    Total NET: -930 mL      04 Jul 2017 07:01  -  05 Jul 2017 06:50  --------------------------------------------------------  IN:    lactated ringers.: 1000 mL    Oral Fluid: 1220 mL    Solution: 600 mL    Solution: 200 mL  Total IN: 3020 mL    OUT:    Indwelling Catheter - Urethral: 2375 mL  Total OUT: 2375 mL    Total NET: 645 mL    Patient with asymptomati bradycardia EKG first degree heart block      Physical Exam:  Gen: NAD  Abd: soft NT/ND  :monte ventral aquired hypospadius clear urine output     Labs:    07-04    7.3   / 40.1  /1.06     07-03    8.5   / 40.4  /1.01                           13.9   7.3   )-----------( 188      ( 04 Jul 2017 07:06 )             40.1     07-04    140  |  103  |  20  ----------------------------<  108<H>  3.7   |  27  |  1.06    Ca    9.0      04 Jul 2017 07:06      PT/INR - ( 03 Jul 2017 19:29 )   PT: 11.7 sec;   INR: 1.07 ratio         PTT - ( 03 Jul 2017 19:29 )  PTT:35.2 sec      Urine Cx:

## 2017-07-06 LAB — SURGICAL PATHOLOGY STUDY: SIGNIFICANT CHANGE UP

## 2017-07-06 PROCEDURE — 93010 ELECTROCARDIOGRAM REPORT: CPT

## 2017-07-06 RX ORDER — SODIUM CHLORIDE 9 MG/ML
1000 INJECTION, SOLUTION INTRAVENOUS
Qty: 0 | Refills: 0 | Status: DISCONTINUED | OUTPATIENT
Start: 2017-07-06 | End: 2017-07-08

## 2017-07-06 RX ORDER — VANCOMYCIN HCL 1 G
1000 VIAL (EA) INTRAVENOUS EVERY 12 HOURS
Qty: 0 | Refills: 0 | Status: DISCONTINUED | OUTPATIENT
Start: 2017-07-07 | End: 2017-07-11

## 2017-07-06 RX ORDER — VANCOMYCIN HCL 1 G
1000 VIAL (EA) INTRAVENOUS EVERY 24 HOURS
Qty: 0 | Refills: 0 | Status: DISCONTINUED | OUTPATIENT
Start: 2017-07-06 | End: 2017-07-06

## 2017-07-06 RX ORDER — VANCOMYCIN HCL 1 G
VIAL (EA) INTRAVENOUS
Qty: 0 | Refills: 0 | Status: DISCONTINUED | OUTPATIENT
Start: 2017-07-06 | End: 2017-07-11

## 2017-07-06 RX ORDER — VANCOMYCIN HCL 1 G
1000 VIAL (EA) INTRAVENOUS ONCE
Qty: 0 | Refills: 0 | Status: COMPLETED | OUTPATIENT
Start: 2017-07-06 | End: 2017-07-06

## 2017-07-06 RX ORDER — PIPERACILLIN AND TAZOBACTAM 4; .5 G/20ML; G/20ML
3.38 INJECTION, POWDER, LYOPHILIZED, FOR SOLUTION INTRAVENOUS EVERY 8 HOURS
Qty: 0 | Refills: 0 | Status: DISCONTINUED | OUTPATIENT
Start: 2017-07-06 | End: 2017-07-08

## 2017-07-06 RX ADMIN — SODIUM CHLORIDE 3 MILLILITER(S): 9 INJECTION INTRAMUSCULAR; INTRAVENOUS; SUBCUTANEOUS at 13:18

## 2017-07-06 RX ADMIN — LOSARTAN POTASSIUM 25 MILLIGRAM(S): 100 TABLET, FILM COATED ORAL at 12:08

## 2017-07-06 RX ADMIN — SODIUM CHLORIDE 3 MILLILITER(S): 9 INJECTION INTRAMUSCULAR; INTRAVENOUS; SUBCUTANEOUS at 21:41

## 2017-07-06 RX ADMIN — HEPARIN SODIUM 5000 UNIT(S): 5000 INJECTION INTRAVENOUS; SUBCUTANEOUS at 13:30

## 2017-07-06 RX ADMIN — HEPARIN SODIUM 5000 UNIT(S): 5000 INJECTION INTRAVENOUS; SUBCUTANEOUS at 06:03

## 2017-07-06 RX ADMIN — Medication 100 MILLIGRAM(S): at 17:20

## 2017-07-06 RX ADMIN — ATORVASTATIN CALCIUM 10 MILLIGRAM(S): 80 TABLET, FILM COATED ORAL at 21:44

## 2017-07-06 RX ADMIN — SODIUM CHLORIDE 75 MILLILITER(S): 9 INJECTION, SOLUTION INTRAVENOUS at 18:23

## 2017-07-06 RX ADMIN — PIPERACILLIN AND TAZOBACTAM 25 GRAM(S): 4; .5 INJECTION, POWDER, LYOPHILIZED, FOR SOLUTION INTRAVENOUS at 21:44

## 2017-07-06 RX ADMIN — HEPARIN SODIUM 5000 UNIT(S): 5000 INJECTION INTRAVENOUS; SUBCUTANEOUS at 21:44

## 2017-07-06 RX ADMIN — Medication 100 MILLIGRAM(S): at 06:03

## 2017-07-06 RX ADMIN — SODIUM CHLORIDE 75 MILLILITER(S): 9 INJECTION, SOLUTION INTRAVENOUS at 14:17

## 2017-07-06 RX ADMIN — Medication 81 MILLIGRAM(S): at 12:01

## 2017-07-06 RX ADMIN — SODIUM CHLORIDE 3 MILLILITER(S): 9 INJECTION INTRAMUSCULAR; INTRAVENOUS; SUBCUTANEOUS at 06:07

## 2017-07-06 RX ADMIN — Medication 250 MILLIGRAM(S): at 06:32

## 2017-07-06 RX ADMIN — PIPERACILLIN AND TAZOBACTAM 25 GRAM(S): 4; .5 INJECTION, POWDER, LYOPHILIZED, FOR SOLUTION INTRAVENOUS at 13:18

## 2017-07-06 RX ADMIN — Medication 250 MILLIGRAM(S): at 19:56

## 2017-07-06 NOTE — PROGRESS NOTE ADULT - SUBJECTIVE AND OBJECTIVE BOX
Subjective  Pt doing well this PM. No chest pain or shortness of breath throughout the day. Tolerating regular diet.    Objective    Vital signs  T(F): , Max: 98.2 (07-06-17 @ 00:00)  HR: 57 (07-06-17 @ 18:00)  BP: 120/58 (07-06-17 @ 18:00)  SpO2: 99% (07-06-17 @ 18:00)  Wt(kg): --    Output     07-05 @ 07:01  -  07-06 @ 07:00  --------------------------------------------------------  IN: 975 mL / OUT: 1550 mL / NET: -575 mL    07-06 @ 07:01  -  07-06 @ 18:55  --------------------------------------------------------  IN: 1250 mL / OUT: 510 mL / NET: 740 mL  monte clear yellow output    Gen: NAD  Abd: steri strips in place, suprapubic and perineal wounds C/D/I  : unchanged acquired ventral hypospadias    Labs      07-05 @ 18:29    WBC --    / Hct --    / SCr 1.11     Imaging

## 2017-07-07 DIAGNOSIS — E78.5 HYPERLIPIDEMIA, UNSPECIFIED: ICD-10-CM

## 2017-07-07 DIAGNOSIS — I10 ESSENTIAL (PRIMARY) HYPERTENSION: ICD-10-CM

## 2017-07-07 DIAGNOSIS — Z29.9 ENCOUNTER FOR PROPHYLACTIC MEASURES, UNSPECIFIED: ICD-10-CM

## 2017-07-07 DIAGNOSIS — I45.89 OTHER SPECIFIED CONDUCTION DISORDERS: ICD-10-CM

## 2017-07-07 DIAGNOSIS — E11.9 TYPE 2 DIABETES MELLITUS WITHOUT COMPLICATIONS: ICD-10-CM

## 2017-07-07 LAB
ANION GAP SERPL CALC-SCNC: 9 MMOL/L — SIGNIFICANT CHANGE UP (ref 5–17)
BUN SERPL-MCNC: 19 MG/DL — SIGNIFICANT CHANGE UP (ref 7–23)
CALCIUM SERPL-MCNC: 8.7 MG/DL — SIGNIFICANT CHANGE UP (ref 8.4–10.5)
CHLORIDE SERPL-SCNC: 106 MMOL/L — SIGNIFICANT CHANGE UP (ref 96–108)
CO2 SERPL-SCNC: 27 MMOL/L — SIGNIFICANT CHANGE UP (ref 22–31)
CREAT SERPL-MCNC: 1.09 MG/DL — SIGNIFICANT CHANGE UP (ref 0.5–1.3)
GLUCOSE SERPL-MCNC: 104 MG/DL — HIGH (ref 70–99)
HCT VFR BLD CALC: 37 % — LOW (ref 39–50)
HGB BLD-MCNC: 12.3 G/DL — LOW (ref 13–17)
MCHC RBC-ENTMCNC: 30.5 PG — SIGNIFICANT CHANGE UP (ref 27–34)
MCHC RBC-ENTMCNC: 33.2 GM/DL — SIGNIFICANT CHANGE UP (ref 32–36)
MCV RBC AUTO: 91.8 FL — SIGNIFICANT CHANGE UP (ref 80–100)
PLATELET # BLD AUTO: 184 K/UL — SIGNIFICANT CHANGE UP (ref 150–400)
POTASSIUM SERPL-MCNC: 3.7 MMOL/L — SIGNIFICANT CHANGE UP (ref 3.5–5.3)
POTASSIUM SERPL-SCNC: 3.7 MMOL/L — SIGNIFICANT CHANGE UP (ref 3.5–5.3)
RBC # BLD: 4.03 M/UL — LOW (ref 4.2–5.8)
RBC # FLD: 13.2 % — SIGNIFICANT CHANGE UP (ref 10.3–14.5)
SODIUM SERPL-SCNC: 142 MMOL/L — SIGNIFICANT CHANGE UP (ref 135–145)
VANCOMYCIN TROUGH SERPL-MCNC: 17.8 UG/ML — SIGNIFICANT CHANGE UP (ref 10–20)
WBC # BLD: 8.1 K/UL — SIGNIFICANT CHANGE UP (ref 3.8–10.5)
WBC # FLD AUTO: 8.1 K/UL — SIGNIFICANT CHANGE UP (ref 3.8–10.5)

## 2017-07-07 PROCEDURE — 99233 SBSQ HOSP IP/OBS HIGH 50: CPT

## 2017-07-07 PROCEDURE — 93010 ELECTROCARDIOGRAM REPORT: CPT

## 2017-07-07 PROCEDURE — 99223 1ST HOSP IP/OBS HIGH 75: CPT

## 2017-07-07 RX ORDER — POTASSIUM CHLORIDE 20 MEQ
40 PACKET (EA) ORAL EVERY 4 HOURS
Qty: 0 | Refills: 0 | Status: COMPLETED | OUTPATIENT
Start: 2017-07-07 | End: 2017-07-07

## 2017-07-07 RX ORDER — MAGNESIUM SULFATE 500 MG/ML
1 VIAL (ML) INJECTION ONCE
Qty: 0 | Refills: 0 | Status: COMPLETED | OUTPATIENT
Start: 2017-07-07 | End: 2017-07-07

## 2017-07-07 RX ADMIN — Medication 250 MILLIGRAM(S): at 21:15

## 2017-07-07 RX ADMIN — SODIUM CHLORIDE 3 MILLILITER(S): 9 INJECTION INTRAMUSCULAR; INTRAVENOUS; SUBCUTANEOUS at 05:01

## 2017-07-07 RX ADMIN — LOSARTAN POTASSIUM 25 MILLIGRAM(S): 100 TABLET, FILM COATED ORAL at 05:18

## 2017-07-07 RX ADMIN — HEPARIN SODIUM 5000 UNIT(S): 5000 INJECTION INTRAVENOUS; SUBCUTANEOUS at 21:16

## 2017-07-07 RX ADMIN — Medication 40 MILLIEQUIVALENT(S): at 15:04

## 2017-07-07 RX ADMIN — HEPARIN SODIUM 5000 UNIT(S): 5000 INJECTION INTRAVENOUS; SUBCUTANEOUS at 15:07

## 2017-07-07 RX ADMIN — Medication 100 MILLIGRAM(S): at 05:18

## 2017-07-07 RX ADMIN — HEPARIN SODIUM 5000 UNIT(S): 5000 INJECTION INTRAVENOUS; SUBCUTANEOUS at 05:18

## 2017-07-07 RX ADMIN — PIPERACILLIN AND TAZOBACTAM 25 GRAM(S): 4; .5 INJECTION, POWDER, LYOPHILIZED, FOR SOLUTION INTRAVENOUS at 05:18

## 2017-07-07 RX ADMIN — SODIUM CHLORIDE 3 MILLILITER(S): 9 INJECTION INTRAMUSCULAR; INTRAVENOUS; SUBCUTANEOUS at 21:22

## 2017-07-07 RX ADMIN — Medication 81 MILLIGRAM(S): at 15:07

## 2017-07-07 RX ADMIN — PIPERACILLIN AND TAZOBACTAM 25 GRAM(S): 4; .5 INJECTION, POWDER, LYOPHILIZED, FOR SOLUTION INTRAVENOUS at 22:48

## 2017-07-07 RX ADMIN — Medication 100 GRAM(S): at 17:06

## 2017-07-07 RX ADMIN — PIPERACILLIN AND TAZOBACTAM 25 GRAM(S): 4; .5 INJECTION, POWDER, LYOPHILIZED, FOR SOLUTION INTRAVENOUS at 13:34

## 2017-07-07 RX ADMIN — ATORVASTATIN CALCIUM 10 MILLIGRAM(S): 80 TABLET, FILM COATED ORAL at 21:16

## 2017-07-07 RX ADMIN — Medication 250 MILLIGRAM(S): at 08:51

## 2017-07-07 RX ADMIN — Medication 100 MILLIGRAM(S): at 17:10

## 2017-07-07 RX ADMIN — Medication 40 MILLIEQUIVALENT(S): at 18:34

## 2017-07-07 RX ADMIN — SODIUM CHLORIDE 3 MILLILITER(S): 9 INJECTION INTRAMUSCULAR; INTRAVENOUS; SUBCUTANEOUS at 13:36

## 2017-07-07 NOTE — PROGRESS NOTE ADULT - SUBJECTIVE AND OBJECTIVE BOX
MEDICINE ACCEPT NOTE    CC: Patient is a 82y old  Male who presents with a chief complaint of eroded AUS (2017 17:12)    HPI/ROS: 82M with PMH of prostate cancer, s/p prostatectomy 1990s, s/p artificial urinary sphincter, HTN, HLD, DM type 2, admitted to [facility] on 7/3/17 for artificial urinary sphincter erosion, s/p uncomplicated removal. TTE on  revealed bileaflet mitral valve prolapse and focal thickening on atrial side of anterior mitral valve suspicious for vegetation. ECG showed sinus bradycardia with 1st degree AVB ( ms), with a non-specific intra-ventricular conduction block; currently followed by EP. This AM, pt had multiple telemetry episodes showing 3rd degree HB, asymptomatic, no palpitations. Currently denies headaches, F/C, dizziness, syncope, CP/SOB, N/V, abdominal pain, dysuria, changes in BM, peripheral swelling, skin changes, new joint aches.    Allergies  No Known Allergies      PAST MEDICAL & SURGICAL HISTORY:  Prostate CA  Diabetes mellitus  Urinary incontinence  H/O prostatectomy    FAMILY HISTORY:  No pertinent family history in first degree relatives    SOCIAL HISTORY:  OCCUPATION:  (retired)  TOBACCO USE: denies history  ALCOHOL USE: denies history  RECREATIONAL DRUG USE: denies history  HIGH-RISK SEXUAL ACTIVITY: denies history    MEDICATIONS:  aspirin enteric coated 81 milliGRAM(s) Oral daily  hydrochlorothiazide 12.5 milliGRAM(s) Oral daily  heparin  Injectable 5000 Unit(s) SubCutaneous every 8 hours  losartan 25 milliGRAM(s) Oral daily  piperacillin/tazobactam IVPB. 3.375 Gram(s) IV Intermittent every 8 hours  vancomycin  IVPB 1000 milliGRAM(s) IV Intermittent every 12 hours  vancomycin  IVPB   IV Intermittent   docusate sodium 100 milliGRAM(s) Oral two times a day  atorvastatin 10 milliGRAM(s) Oral at bedtime  insulin lispro (HumaLOG) corrective regimen sliding scale   SubCutaneous three times a day before meals  insulin lispro (HumaLOG) corrective regimen sliding scale   SubCutaneous at bedtime  lactated ringers. 1000 milliLiter(s) IV Continuous <Continuous>  potassium chloride    Tablet ER 40 milliEquivalent(s) Oral every 4 hours    PHYSICAL EXAM:  T(C): 36.4 (17 @ 13:00), Max: 36.8 (17 @ 19:00)  HR: 46 (17 @ 13:00) (46 - 57)  BP: 136/54 (17 @ 13:00) (116/58 - 141/47)  RR: 18 (17 @ 13:00) (15 - 18)  SpO2: 98% (17 @ 13:00) (96% - 99%)  Wt(kg): --  Daily     Daily Weight in k.8 (2017 04:41)  I&O's Summary    2017 07:01  -  2017 07:00  --------------------------------------------------------  IN: 2500 mL / OUT: 2460 mL / NET: 40 mL      TELEMETRY: 1st degree HB in 40s    Appearance: elderly  male in NAD	  HEENT:   Normal oral mucosa, PERRL, EOMI	  Lymphatic: No lymphadenopathy  Cardiovascular: Normal S1 S2, No JVD, No murmurs, No edema  Respiratory: Lungs clear to auscultation	  Psychiatry: A & O x 3, Mood & affect appropriate  Gastrointestinal:  Soft, Non-tender, + BS  Genitourinary: catheter from ventral penis draining clear yellow fluid, no blood/clots around penile meatus  Skin: No rashes, No ecchymoses, No cyanosis	  Neurologic: Non-focal  MSK/Extremities: Normal range of motion, No clubbing, cyanosis or edema  Vascular: Peripheral pulses palpable 2+ bilaterally    LABS:	 	                        12.3   8.1   )-----------( 184      ( 2017 06:02 )             37.0     07-07    142  |  106  |  19  ----------------------------<  104<H>  3.7   |  27  |  1.09  07-05    143  |  103  |  14  ----------------------------<  135<H>  3.9   |  24  |  1.11    Ca    8.7      2017 06:02  Ca    8.5      2017 18:29      FS: CAPILLARY BLOOD GLUCOSE  92 (2017 12:31)  93 (2017 08:18)  130 (2017 22:00)    17:  Diagnosis Line SINUS BRADYCARDIA WITH 1ST DEGREE A-V BLOCK  LEFT AXIS DEVIATION  NON-SPECIFIC INTRA-VENTRICULAR CONDUCTION BLOCK    17 TTE:  1. 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 This is suspicous for vegetation  in the appropriate clinical setting.  At least moderate,  eccentric mitral regurgitation (may be underestimated)  2. Calcified trileaflet aortic valve withnormal opening.  Mild-moderate aortic regurgitation.  3. Normal left ventricular systolic function. No segmental  wall motion abnormalities.  4. Normal right ventricular size and function.  *** No previous Echo exam. Dr. Richter notified of  findings at 1130 AM on 17. CARL could better evaluate  the mitral and aortic valves if clinically indicated.  EF: %

## 2017-07-07 NOTE — CONSULT NOTE ADULT - ASSESSMENT
82yoM, with DM, prostate CA s/p prostatectomy, ureteral artificial sphincter placement for incontinence.   Patietn admitted for sphincter removal.which was done yesterday. Today on tele, he was noted to have episodes of complete heart block.  He is not on any AVN blockers, and his baseline EKG has significant conduction disease (RBBB, LAFB, 1st degree AVB).  TTE shows mass highly concerning for mitral valve endocarditis.     CHB  - given his history of prostate surgery, his echo and his lack of WBC elevation or fever, concern is for endocarditis/  ? suspicion  for prior endocarditis, now stable, with residual conduction disease?  - would agree with CARL to evaluate extent of vegetation of aortic annulus.   - will confer with echocardiographer, if preferred, may require tranfer to CCU monday for TVP placement prior to CARL.   -hold AVN blocker  - EP to continue to follow  - please maintain K>4 , Mg>2  - For any questions or If there are any concerns, please call [phone] during daytime, covered by [id number] after-hours 82yoM, with DM, prostate CA s/p prostatectomy, ureteral artificial sphincter placement for incontinence.   Patietn admitted for sphincter removal.which was done yesterday. Today on tele, he was noted to have episodes of complete heart block.  He is not on any AVN blockers, and his baseline EKG has significant conduction disease (RBBB, LAFB, 1st degree AVB).  TTE shows mass highly concerning for mitral valve endocarditis.     CHB  - given his history of prostate surgery, his echo and his lack of WBC elevation or fever, concern is for endocarditis/  - would agree with CARL to evaluate extent of vegetation of aortic annulus. Patient does not require TVP for CARL, is low risk from EP standpoint.   -hold AVN blocker  -patient will need PPM once cleared from infection standpoint.   - EP to continue to follow  - please maintain K>4 , Mg>2  - For any questions or If there are any concerns, please call [phone] during daytime, covered by [id number] after-hours

## 2017-07-07 NOTE — CONSULT NOTE ADULT - ASSESSMENT
82 y.o. Gentleman - h.o. T2DM, Prostate CA, no known CV disease (including conduction disease) - p/w urinary incontinence with elective procedure for revision of AUS - and cardiology consulted for sinus bradycardia with 1' AVB (without prior EKGs) to compare as well as a subsequent finding of thickening on the mitral valve with eccentric regurgitation, suggestive of possible vegetation. Exam without signs of endocarditis, but with a HSM murmurat apex.  1. Sinus Bradycardia with episodes of CHB with stable junctional escape 2. Thickening of MV (possible vegetation)  3. AUS Revision   Recommendations: - Although receiving antibiotics and no blood cultures, patient has no sxs/signs of endocarditis with the exception of the echo finding, which does not meet Modified [facility]'s Criteria - Stable junctional rhythm; may obtain CARL  - Tele; monitor closely - If AMS/low BP/CP, low threshold to move to uni - ID consult is reasonable post-operatively - Once cleared from infectious standpoint, per EP for PPM

## 2017-07-07 NOTE — CONSULT NOTE ADULT - SUBJECTIVE AND OBJECTIVE BOX
82 y.o. Gentleman - h.o. T2DM, Prostate CA, no known CV disease (including conduction disease) - p/w urinary incontinence with elective procedure for revision of AUS - and cardiology consulted for sinus bradycardia with 1' AVB (without prior EKGs) to compare as well as a subsequent finding of thickening on the mitral valve with eccentric regurgitation, suggestive of possible vegetation. Exam without signs of endocarditis, but with a HSM murmurat apex.  1. Sinus Bradycardia with complete heart block but stable junctional rhythm 2. Thickening of MV (possible vegetation) -  ========================================== Overnight Events - Appreciate EP recs - Stable junctional escape; no CP/SOB/Palps/AMS - BP Stable 	        PAST MEDICAL & SURGICAL HISTORY:  Prostate CA  Diabetes mellitus  Urinary incontinence  H/O prostatectomy      PREVIOUS DIAGNOSTIC TESTING:: unknown    HOME MEDICATIONS :     MEDICATIONS  (STANDING):  MEDICATIONS  (STANDING):  aspirin enteric coated 81 milliGRAM(s) Oral daily  atorvastatin 10 milliGRAM(s) Oral at bedtime  hydrochlorothiazide 12.5 milliGRAM(s) Oral daily  docusate sodium 100 milliGRAM(s) Oral two times a day  insulin lispro (HumaLOG) corrective regimen sliding scale   SubCutaneous three times a day before meals  insulin lispro (HumaLOG) corrective regimen sliding scale   SubCutaneous at bedtime  sodium chloride 0.9% lock flush 3 milliLiter(s) IV Push every 8 hours  heparin  Injectable 5000 Unit(s) SubCutaneous every 8 hours  losartan 25 milliGRAM(s) Oral daily  piperacillin/tazobactam IVPB. 3.375 Gram(s) IV Intermittent every 8 hours  lactated ringers. 1000 milliLiter(s) (75 mL/Hr) IV Continuous <Continuous>  vancomycin  IVPB 1000 milliGRAM(s) IV Intermittent every 12 hours  vancomycin  IVPB   IV Intermittent   potassium chloride    Tablet ER 40 milliEquivalent(s) Oral every 4 hours      MEDICATIONS  (PRN):      FAMILY HISTORY:  No pertinent family history in first degree relatives    Allergies    No Known Allergies    Intolerances      PHYSICAL EXAM    General:	well appearing male in NAD      Respiratory and Thorax: CTA B/L  	  Cardiovascular:	S1S2    Gastrointestinal:	 abdomen soft, NT, ND		    Allergic/Immunologic:	  All others negative.    Vital Signs Last 24 Hrs  T(C): 36.4 (17 @ 13:00), Max: 36.8 (17 @ 19:00)  HR: 46 (17 @ 13:00) (46 - 57)  BP: 136/54 (17 @ 13:00) (116/58 - 141/47)  RR: 18 (17 @ 13:00) (15 - 18)  SpO2: 98% (17 @ 13:00) (96% - 99%)  Wt(kg): --  Daily     Daily Weight in k.8 (2017 04:41)  I&O's Summary    2017 07:01  -  2017 07:00  --------------------------------------------------------  IN: 2500 mL / OUT: 2460 mL / NET: 40 mL                              13.9   7.3   )-----------( 188      ( 2017 07:06 )             40.1     07-04    140  |  103  |  20  ----------------------------<  108<H>  3.7   |  27  |  1.06    Ca    9.0      2017 07:06    PT/INR - ( 2017 19:29 )   PT: 11.7 sec;   INR: 1.07 ratio         PTT - ( 2017 19:29 )  PTT:35.2 sec

## 2017-07-07 NOTE — PROGRESS NOTE ADULT - SUBJECTIVE AND OBJECTIVE BOX
UROLOGY    called by rn staff with concerns of 3rd degree av block on tele monitor.  pt without complaints.  12 lead ekg performed -> 1st degree av block.  telemetry strip reviewed with cardiology.  no 3rd degree av block but rather concern for isorhythmic av dissociaiton.  no acute intervention as patient reverts back to 1st degree heart block with arousal and is asymptomatic

## 2017-07-07 NOTE — PROGRESS NOTE ADULT - ASSESSMENT
82M s/p AUS explant found to have asymptomatic bradycardia and thickened MV on TTE  -NPO, IVF  -c/w vanc/zosyn  -f/u cardiology; Dr. Parr called this AM, who recommended an EP consult. EP is aware of the patient and will see him around 8:30  -pull accordion drain  -CARL on hold until EP clears  -f/u Bcx

## 2017-07-07 NOTE — PROGRESS NOTE ADULT - PROBLEM SELECTOR PROBLEM 2
Type 2 diabetes mellitus without complication, without long-term current use of insulin R/O Endocarditis

## 2017-07-07 NOTE — CONSULT NOTE ADULT - PROBLEM SELECTOR RECOMMENDATION 3
The patient is on IV hydration and HCTZ at the moment. Would continue IV fluids, stop HCTZ, and monitor BP closely.  Continue Cozaar at the current dose.

## 2017-07-07 NOTE — CONSULT NOTE ADULT - ASSESSMENT
82M with prostate cancer, s/p prostatectomy 1990s, s/p artificial urinary sphincter, HTN, HLD, DM type 2, here s/p removal of an eroded artificial urinary sphincter. TTE (7/5) concerning for mitral valve vegetation. Telemetry findings reviewed: concerning of alternating 1st degree AVB and 3rd degree AVB (AV dissociation, possibly junctional rhythm).

## 2017-07-07 NOTE — PROGRESS NOTE ADULT - SUBJECTIVE AND OBJECTIVE BOX
Subjective  Pt briefly went into 3rd degree heart block on the tele monitor around 5AM, w/HR to 30s. 12 lead EKG revealed 1st degree AV block w/concern for isorhythmic AV dissociation, pt was asymptomatic during the episode.     Objective    Vital signs  T(F): , Max: 98.3 (07-06-17 @ 22:15)  HR: 52 (07-07-17 @ 04:41)  BP: 141/47 (07-07-17 @ 04:41)  SpO2: 99% (07-07-17 @ 04:41)  Wt(kg): --    Output     07-06 @ 07:01  -  07-07 @ 07:00  --------------------------------------------------------  IN: 2500 mL / OUT: 2460 mL / NET: 40 mL      Gen: NAD  : steris intact, suprapubic and perineal incisions C/D/I, appropriately tender    Labs      07-07 @ 06:02    WBC 8.1   / Hct 37.0  / SCr 1.09     07-05 @ 18:29    WBC --    / Hct --    / SCr 1.11       Blood Cx: Culture - Blood (07.05.17 @ 22:11)    Specimen Source: .Blood Blood    Culture Results:   No growth to date.

## 2017-07-07 NOTE — CONSULT NOTE ADULT - PROBLEM SELECTOR RECOMMENDATION 9
Concern for intermittent 3rd degree heart block. The patient asymptomatic.  Currently on telemetry.  EP is aware - consult pending.  There is a concern of possible Mitral Valve vegetation given TTE findings. If cleared by EP, he'll require a CARL as the next step. Continue broad spectrum antibiotic for now (Vancomycin 1g IV BID with good levels and Zosyn 3.375g IV q8h). If there is IE, the source may be the eroded artificial urinary sphincter (now removed by ).  I believe that given a possibility of infective endocarditis with acute heart block findings, he would be safer in an ICU setting for now.  If CCU is not recommended, the patient may be transferred to the medicine service for further care. Concern for intermittent 3rd degree heart block. The patient is asymptomatic.  Currently on telemetry.  EP is aware - consult pending.  There is a concern of possible Mitral Valve vegetation given TTE findings. If cleared by EP, he'll require a CARL as the next step. Continue broad spectrum antibiotics for now (Vancomycin 1g IV BID with good levels and Zosyn 3.375g IV q8h). If there is IE, the source may be the eroded artificial urinary sphincter (now removed by ).  I believe that given a possibility of infective endocarditis with acute heart block findings, he would be safer in an ICU setting for now.  If CCU is not recommended, the patient may be transferred to the medicine service for further care.

## 2017-07-07 NOTE — PROGRESS NOTE ADULT - PROBLEM SELECTOR PLAN 2
-findings suspicious for endocarditis on TTE  -c/w vanc/zosyn - 6 wk course recommended  -follow vanc troughs  -CARL Monday pending TVP placement

## 2017-07-07 NOTE — CHART NOTE - NSCHARTNOTEFT_GEN_A_CORE
Phone #[phone]    Had discussion with patient regarding code status. Patient would like to be FULL CODE at this time. He requests that in the event that he is unable to make decisions for himself, that his children (1 son and 2 daughters) make decisions for him.

## 2017-07-08 LAB
ANION GAP SERPL CALC-SCNC: 10 MMOL/L — SIGNIFICANT CHANGE UP (ref 5–17)
BUN SERPL-MCNC: 13 MG/DL — SIGNIFICANT CHANGE UP (ref 7–23)
CALCIUM SERPL-MCNC: 8.9 MG/DL — SIGNIFICANT CHANGE UP (ref 8.4–10.5)
CHLORIDE SERPL-SCNC: 105 MMOL/L — SIGNIFICANT CHANGE UP (ref 96–108)
CO2 SERPL-SCNC: 26 MMOL/L — SIGNIFICANT CHANGE UP (ref 22–31)
CREAT SERPL-MCNC: 0.89 MG/DL — SIGNIFICANT CHANGE UP (ref 0.5–1.3)
CRP SERPL-MCNC: 2.4 MG/DL — HIGH (ref 0–0.4)
CULTURE RESULTS: NO GROWTH — SIGNIFICANT CHANGE UP
ERYTHROCYTE [SEDIMENTATION RATE] IN BLOOD: 36 MM/HR — HIGH (ref 0–20)
GLUCOSE SERPL-MCNC: 91 MG/DL — SIGNIFICANT CHANGE UP (ref 70–99)
HCT VFR BLD CALC: 38.6 % — LOW (ref 39–50)
HGB BLD-MCNC: 12.6 G/DL — LOW (ref 13–17)
MAGNESIUM SERPL-MCNC: 2.1 MG/DL — SIGNIFICANT CHANGE UP (ref 1.6–2.6)
MCHC RBC-ENTMCNC: 29.7 PG — SIGNIFICANT CHANGE UP (ref 27–34)
MCHC RBC-ENTMCNC: 32.5 GM/DL — SIGNIFICANT CHANGE UP (ref 32–36)
MCV RBC AUTO: 91.3 FL — SIGNIFICANT CHANGE UP (ref 80–100)
PHOSPHATE SERPL-MCNC: 1.9 MG/DL — LOW (ref 2.5–4.5)
PLATELET # BLD AUTO: 179 K/UL — SIGNIFICANT CHANGE UP (ref 150–400)
POTASSIUM SERPL-MCNC: 3.6 MMOL/L — SIGNIFICANT CHANGE UP (ref 3.5–5.3)
POTASSIUM SERPL-SCNC: 3.6 MMOL/L — SIGNIFICANT CHANGE UP (ref 3.5–5.3)
RBC # BLD: 4.23 M/UL — SIGNIFICANT CHANGE UP (ref 4.2–5.8)
RBC # FLD: 13.3 % — SIGNIFICANT CHANGE UP (ref 10.3–14.5)
SODIUM SERPL-SCNC: 141 MMOL/L — SIGNIFICANT CHANGE UP (ref 135–145)
SPECIMEN SOURCE: SIGNIFICANT CHANGE UP
WBC # BLD: 8.8 K/UL — SIGNIFICANT CHANGE UP (ref 3.8–10.5)
WBC # FLD AUTO: 8.8 K/UL — SIGNIFICANT CHANGE UP (ref 3.8–10.5)

## 2017-07-08 PROCEDURE — 99222 1ST HOSP IP/OBS MODERATE 55: CPT

## 2017-07-08 PROCEDURE — 99233 SBSQ HOSP IP/OBS HIGH 50: CPT

## 2017-07-08 RX ORDER — CEFTRIAXONE 500 MG/1
2 INJECTION, POWDER, FOR SOLUTION INTRAMUSCULAR; INTRAVENOUS EVERY 24 HOURS
Qty: 0 | Refills: 0 | Status: DISCONTINUED | OUTPATIENT
Start: 2017-07-08 | End: 2017-07-11

## 2017-07-08 RX ORDER — SENNA PLUS 8.6 MG/1
2 TABLET ORAL AT BEDTIME
Qty: 0 | Refills: 0 | Status: DISCONTINUED | OUTPATIENT
Start: 2017-07-08 | End: 2017-07-14

## 2017-07-08 RX ORDER — POLYETHYLENE GLYCOL 3350 17 G/17G
17 POWDER, FOR SOLUTION ORAL DAILY
Qty: 0 | Refills: 0 | Status: DISCONTINUED | OUTPATIENT
Start: 2017-07-08 | End: 2017-07-14

## 2017-07-08 RX ADMIN — LOSARTAN POTASSIUM 25 MILLIGRAM(S): 100 TABLET, FILM COATED ORAL at 05:50

## 2017-07-08 RX ADMIN — Medication 12.5 MILLIGRAM(S): at 05:51

## 2017-07-08 RX ADMIN — SODIUM CHLORIDE 3 MILLILITER(S): 9 INJECTION INTRAMUSCULAR; INTRAVENOUS; SUBCUTANEOUS at 06:01

## 2017-07-08 RX ADMIN — PIPERACILLIN AND TAZOBACTAM 25 GRAM(S): 4; .5 INJECTION, POWDER, LYOPHILIZED, FOR SOLUTION INTRAVENOUS at 13:32

## 2017-07-08 RX ADMIN — SODIUM CHLORIDE 3 MILLILITER(S): 9 INJECTION INTRAMUSCULAR; INTRAVENOUS; SUBCUTANEOUS at 12:16

## 2017-07-08 RX ADMIN — SODIUM CHLORIDE 75 MILLILITER(S): 9 INJECTION, SOLUTION INTRAVENOUS at 05:51

## 2017-07-08 RX ADMIN — SODIUM CHLORIDE 3 MILLILITER(S): 9 INJECTION INTRAMUSCULAR; INTRAVENOUS; SUBCUTANEOUS at 21:11

## 2017-07-08 RX ADMIN — Medication 100 MILLIGRAM(S): at 05:51

## 2017-07-08 RX ADMIN — Medication 250 MILLIGRAM(S): at 21:05

## 2017-07-08 RX ADMIN — POLYETHYLENE GLYCOL 3350 17 GRAM(S): 17 POWDER, FOR SOLUTION ORAL at 15:40

## 2017-07-08 RX ADMIN — HEPARIN SODIUM 5000 UNIT(S): 5000 INJECTION INTRAVENOUS; SUBCUTANEOUS at 21:04

## 2017-07-08 RX ADMIN — CEFTRIAXONE 100 GRAM(S): 500 INJECTION, POWDER, FOR SOLUTION INTRAMUSCULAR; INTRAVENOUS at 17:47

## 2017-07-08 RX ADMIN — Medication 81 MILLIGRAM(S): at 12:17

## 2017-07-08 RX ADMIN — HEPARIN SODIUM 5000 UNIT(S): 5000 INJECTION INTRAVENOUS; SUBCUTANEOUS at 13:34

## 2017-07-08 RX ADMIN — ATORVASTATIN CALCIUM 10 MILLIGRAM(S): 80 TABLET, FILM COATED ORAL at 21:05

## 2017-07-08 RX ADMIN — HEPARIN SODIUM 5000 UNIT(S): 5000 INJECTION INTRAVENOUS; SUBCUTANEOUS at 05:50

## 2017-07-08 RX ADMIN — SENNA PLUS 2 TABLET(S): 8.6 TABLET ORAL at 21:05

## 2017-07-08 RX ADMIN — Medication 250 MILLIGRAM(S): at 09:29

## 2017-07-08 RX ADMIN — Medication 100 MILLIGRAM(S): at 17:47

## 2017-07-08 RX ADMIN — PIPERACILLIN AND TAZOBACTAM 25 GRAM(S): 4; .5 INJECTION, POWDER, LYOPHILIZED, FOR SOLUTION INTRAVENOUS at 05:51

## 2017-07-08 NOTE — PROGRESS NOTE ADULT - PROBLEM SELECTOR PLAN 4
-BP controlled 130s-140s  -c/w HCTZ, losartan -BP controlled 130s-150s  -c/w HCTZ, losartan with hold parameters

## 2017-07-08 NOTE — PROGRESS NOTE ADULT - ASSESSMENT
82M s/p AUS explant found to have asymptomatic bradycardia and thickened MV on TTE  -c/w vanc/zosyn  -f/u cardiology and medicine  -f/u Bcx  -CARL planning Monday  -bowel regimen   -will follow

## 2017-07-08 NOTE — PROGRESS NOTE ADULT - SUBJECTIVE AND OBJECTIVE BOX
Subjective  Overnight, had one brief episode with HR to the 30s. Otherwise, overnight his HR has remained in the 40-50, no syncope or lightheadedness. Today, pt states he has not had BM in 3 days.    Objective    Vital signs  T(F): , Max: 98.1 (07-07-17 @ 21:53)  HR: 50 (07-08-17 @ 12:25)  BP: 152/72 (07-08-17 @ 12:25)  SpO2: 95% (07-08-17 @ 12:25)  Wt(kg): --    Output     07-07 @ 07:01  -  07-08 @ 07:00  --------------------------------------------------------  IN: 1200 mL / OUT: 2900 mL / NET: -1700 mL    07-08 @ 07:01  -  07-08 @ 15:04  --------------------------------------------------------  IN: 540 mL / OUT: 1575 mL / NET: -1035 mL        Gen: NAD  Abd: soft, NT, ND  : steris intact on RLQ incision, prior accordian drain site C/D/I, perineal dressing C/D/I    Labs      07-08 @ 08:29    WBC 8.8   / Hct 38.6  / SCr 0.89     07-07 @ 06:02    WBC 8.1   / Hct 37.0  / SCr 1.09       Urine Cx: Culture - Urine (07.07.17 @ 15:31)    Specimen Source: .Urine Catheterized    Culture Results:   No growth    Culture - Blood (07.05.17 @ 22:11)    Specimen Source: .Blood Blood    Culture Results:   No growth to date. Subjective  Overnight, had one brief episode with HR to the 30s. Otherwise, overnight his HR has remained in the 40-50, no syncope or lightheadedness. Today, pt states he has not had BM in 3 days.    Objective    Vital signs  T(F): , Max: 98.1 (07-07-17 @ 21:53)  HR: 50 (07-08-17 @ 12:25)  BP: 152/72 (07-08-17 @ 12:25)  SpO2: 95% (07-08-17 @ 12:25)  Wt(kg): --    Output     07-07 @ 07:01  -  07-08 @ 07:00  --------------------------------------------------------  IN: 1200 mL / OUT: 2900 mL / NET: -1700 mL    07-08 @ 07:01  -  07-08 @ 15:04  --------------------------------------------------------  IN: 540 mL / OUT: 1575 mL / NET: -1035 mL        Gen: NAD  Abd: soft, NT, ND  : steris intact on RLQ incision, prior accordian drain site C/D/I, perineal dressing C/D/I, monte clear    Labs      07-08 @ 08:29    WBC 8.8   / Hct 38.6  / SCr 0.89     07-07 @ 06:02    WBC 8.1   / Hct 37.0  / SCr 1.09       Urine Cx: Culture - Urine (07.07.17 @ 15:31)    Specimen Source: .Urine Catheterized    Culture Results:   No growth    Culture - Blood (07.05.17 @ 22:11)    Specimen Source: .Blood Blood    Culture Results:   No growth to date.

## 2017-07-08 NOTE — PROGRESS NOTE ADULT - PROBLEM SELECTOR PLAN 2
-findings suspicious for endocarditis on TTE - however, no other signs of endocarditis  -c/w vanc/zosyn - will consult ID for clearance  -follow vanc troughs  -CARL Monday pending TVP placement -findings suspicious for endocarditis on TTE - however, no other signs of endocarditis  -c/w vanc/zosyn - will consult ID for possible DC vs. further workup  -follow vanc troughs  -CARL Monday pending TVP placement

## 2017-07-08 NOTE — CONSULT NOTE ADULT - SUBJECTIVE AND OBJECTIVE BOX
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   Noted to have complete heart block on telemetry with focal thickening of anterior leaflet of mitral valve concerning for endocarditis.   ID consulted for assistance.     He feels well and has no constitutional symptoms of infection. Denies fevers, chills, cough, vomiting, diarrhea. He has pain to his RLQ where procedure appears to have been done but otherwise he is fine.       PAST MEDICAL & SURGICAL HISTORY:  Prostate CA  Diabetes mellitus  Urinary incontinence  H/O prostatectomy      Social history:    FAMILY HISTORY:  No pertinent family history in first degree relatives    REVIEW OF SYSTEMS    General:	Denies any chills. Fevers absent    Skin: No rash  	  Ophthalmologic: Denies any visual complaints, discharge, redness.  	  ENMT: No nasal congestion or throat pain.     Respiratory and Thorax: No cough, sputum or chest pain. Denies shortness of breath.  	  Cardiovascular: No chest pain, palpitations.    Gastrointestinal: No nausea, abdominal pain or diarrhea.    Genitourinary: No dysuria, frequency. No flank pain.    Musculoskeletal: No joint swelling or pain.    Neurological: No confusion. No extremity weakness.    Psychiatric: No hallucinations	    Hematology/Lymphatics: No LN swelling.    Endocrine:	No recent weight gain or loss. No abnormal heat/cold intolerance    Allergic/Immunologic:	No hives or rash   Allergies    No Known Allergies    Intolerances        Antimicrobials:    piperacillin/tazobactam IVPB. 3.375 Gram(s) IV Intermittent every 8 hours  vancomycin  IVPB 1000 milliGRAM(s) IV Intermittent every 12 hours  vancomycin  IVPB   IV Intermittent         Vital Signs Last 24 Hrs  T(C): 36.3 (08 Jul 2017 12:25), Max: 36.7 (07 Jul 2017 21:53)  T(F): 97.4 (08 Jul 2017 12:25), Max: 98.1 (07 Jul 2017 21:53)  HR: 50 (08 Jul 2017 12:25) (48 - 53)  BP: 152/72 (08 Jul 2017 12:25) (151/50 - 155/58)  BP(mean): --  RR: 18 (08 Jul 2017 12:25) (18 - 18)  SpO2: 95% (08 Jul 2017 12:25) (94% - 98%)    PHYSICAL EXAM: Pleasant patient in no acute distress.    Constitutional: Comfortable. Awake and alert    Eyes: No discharge or conjunctival injection    ENMT: No thrush. No pharyngeal exudate or erythema.    Neck: Supple, No LN.      Respiratory: Good air entry bilaterally.    Cardiovascular: S1 S2 wnl, No murmurs.    Gastrointestinal: Soft BS(+) no tenderness, non distended.    Genitourinary: No CVA tendereness     Extremities:No edema.    Vascular: peripheral pulses felt    Neurological: AAO X 3. No grossly focal deficits.    Skin: No rash     Lymph Nodes: No palpable LNs    Musculoskeletal: No joint swelling.    Psychiatric: Affect normal.                                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   Noted to have complete heart block on telemetry with focal thickening of anterior leaflet of mitral valve concerning for endocarditis.   ID consulted for assistance.     He feels well and has no constitutional symptoms of infection. Denies fevers, chills, cough, vomiting, diarrhea. He has pain to his RLQ where procedure appears to have been done but otherwise he is fine.       PAST MEDICAL & SURGICAL HISTORY:  Prostate CA  Diabetes mellitus  Urinary incontinence  H/O prostatectomy      Social history: Lives with family, no smoking.    FAMILY HISTORY:  No pertinent family history in first degree relatives    REVIEW OF SYSTEMS    General:Denies any chills. Fevers absent    Skin: No rash  	  Ophthalmologic: Denies any visual complaints, discharge, redness.  	  ENMT: No nasal congestion or throat pain.     Respiratory and Thorax: No cough, sputum or chest pain. Denies shortness of breath.  	  Cardiovascular: No chest pain, palpitations.    Gastrointestinal: No nausea, + abdominal pain, no diarrhea.    Genitourinary:  No flank pain.    Musculoskeletal: No joint swelling or pain.    Neurological: No confusion. No extremity weakness.    Psychiatric: No hallucinations	    Endocrine:No recent weight gain or loss. No abnormal heat/cold intolerance    Allergic/Immunologic: No hives or rash     Allergies    No Known Allergies        Antimicrobials:    piperacillin/tazobactam IVPB. 3.375 Gram(s) IV Intermittent every 8 hours  vancomycin  IVPB 1000 milliGRAM(s) IV Intermittent every 12 hours         Vital Signs Last 24 Hrs  T(F): 97.4 (08 Jul 2017 12:25), Max: 98.1 (07 Jul 2017 21:53)  HR: 50 (08 Jul 2017 12:25) (48 - 53)  BP: 152/72 (08 Jul 2017 12:25) (151/50 - 155/58)  RR: 18 (08 Jul 2017 12:25) (18 - 18)  SpO2: 95% (08 Jul 2017 12:25) (94% - 98%)      PHYSICAL EXAM: Pleasant patient in no acute distress.    Constitutional: Comfortable. Awake and alert    Eyes: No discharge or conjunctival injection    ENMT: No thrush. No pharyngeal exudate or erythema.    Respiratory: Good air entry bilaterally.    Cardiovascular: S1 S2 wnl, + murmurs, bradycardic.    Gastrointestinal: Soft BS(+), + tenderness with ecchymosis at the rt groin site with steri strips, no discharge or fluctuance, non distended.    Genitourinary: No CVA tenderness, + monte coming out of penis laterally.     Extremities: No edema.    Vascular: peripheral pulses felt, no splinter hemorrhage.     Neurological: AAO X 3. No grossly focal deficits.    Skin: No rash    Musculoskeletal: No joint swelling.    Psychiatric: Affect normal.                            12.6   8.8   )-----------( 179      ( 08 Jul 2017 08:29 )             38.6         07-08    141  |  105  |  13  ----------------------------<  91  3.6   |  26  |  0.89    Ca    8.9      08 Jul 2017 08:29  Phos  1.9     07-08  Mg     2.1     07-08        RECENT CULTURES:  Culture - Urine (07.07.17 @ 15:31)    Specimen Source: .Urine Catheterized   No growth    Culture - Blood (07.05.17 @ 22:11)    Specimen Source: .Blood Blood  No growth to date.          Radiology: Films Reviewed by me [x ]    < from: Xray Chest 1 View AP- PORTABLE-Urgent (07.04.17 @ 16:16) >    IMPRESSION:    The heart size is normal.   The lungs are clear. There are no pleural effusions or pneumothorax.  Degenerative changes of the thoracic spine.      Echo:    < from: Transthoracic Echocardiogram (07.05.17 @ 21:18) >    Conclusions:  1. 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 This is suspicious for vegetation  in the appropriate clinical setting.  At least moderate,  eccentric mitral regurgitation (may be underestimated)  2. Calcified trileaflet aortic valve with normal opening.  Mild-moderate aortic regurgitation.  3. Normal left ventricular systolic function. No segmental  wall motion abnormalities.  4. Normal right ventricular size and function. Patient is a 82y old  Male who presents with a chief complaint of pre-op Abx, eroded AUS (03 Jul 2017 17:12)    HPI:  82M with history of Prostate Cancer. He had an artificial urinary sphincter that had eroded into the urethra on recent cystoscopy admitted for removal of AUS.   Noted to have complete heart block on telemetry with focal thickening of anterior leaflet of mitral valve concerning for endocarditis.   ID consulted for assistance.     He feels well and has no constitutional symptoms of infection. Denies fevers, chills, cough, vomiting, diarrhea. He has pain to his RLQ where procedure appears to have been done but otherwise he is fine.       PAST MEDICAL & SURGICAL HISTORY:  Prostate CA  Diabetes mellitus  Urinary incontinence  H/O prostatectomy      Social history: Lives with family, no smoking.    FAMILY HISTORY:  No pertinent family history in first degree relatives    REVIEW OF SYSTEMS    General:Denies any chills. Fevers absent    Skin: No rash  	  Ophthalmologic: Denies any visual complaints, discharge, redness.  	  ENMT: No nasal congestion or throat pain.     Respiratory and Thorax: No cough, sputum or chest pain. Denies shortness of breath.  	  Cardiovascular: No chest pain, palpitations.    Gastrointestinal: No nausea, + abdominal pain, no diarrhea.    Genitourinary:  No flank pain.    Musculoskeletal: No joint swelling or pain.    Neurological: No confusion. No extremity weakness.    Psychiatric: No hallucinations	    Endocrine:No recent weight gain or loss. No abnormal heat/cold intolerance    Allergic/Immunologic: No hives or rash     Allergies    No Known Allergies        Antimicrobials:    piperacillin/tazobactam IVPB. 3.375 Gram(s) IV Intermittent every 8 hours  vancomycin  IVPB 1000 milliGRAM(s) IV Intermittent every 12 hours         Vital Signs Last 24 Hrs  T(F): 97.4 (08 Jul 2017 12:25), Max: 98.1 (07 Jul 2017 21:53)  HR: 50 (08 Jul 2017 12:25) (48 - 53)  BP: 152/72 (08 Jul 2017 12:25) (151/50 - 155/58)  RR: 18 (08 Jul 2017 12:25) (18 - 18)  SpO2: 95% (08 Jul 2017 12:25) (94% - 98%)      PHYSICAL EXAM: Pleasant patient in no acute distress.    Constitutional: Comfortable. Awake and alert    Eyes: No discharge or conjunctival injection    ENMT: No thrush. No pharyngeal exudate or erythema.    Respiratory: Good air entry bilaterally.    Cardiovascular: S1 S2 wnl, + murmurs, bradycardic.    Gastrointestinal: Soft BS(+), + tenderness with ecchymosis at the rt groin site with steri strips, no discharge or fluctuance, non distended.    Genitourinary: No CVA tenderness, + monte coming out of penis laterally.     Extremities: No edema.    Vascular: peripheral pulses felt, no splinter hemorrhage.     Neurological: AAO X 3. No grossly focal deficits.    Skin: No rash    Musculoskeletal: No joint swelling.    Psychiatric: Affect normal.                            12.6   8.8   )-----------( 179      ( 08 Jul 2017 08:29 )             38.6         07-08    141  |  105  |  13  ----------------------------<  91  3.6   |  26  |  0.89    Ca    8.9      08 Jul 2017 08:29  Phos  1.9     07-08  Mg     2.1     07-08        RECENT CULTURES:  Culture - Urine (07.07.17 @ 15:31)    Specimen Source: .Urine Catheterized   No growth    Culture - Blood (07.05.17 @ 22:11)    Specimen Source: .Blood Blood  No growth to date.          Radiology: Films Reviewed by me [x ]    < from: Xray Chest 1 View AP- PORTABLE-Urgent (07.04.17 @ 16:16) >    IMPRESSION:    The heart size is normal.   The lungs are clear. There are no pleural effusions or pneumothorax.  Degenerative changes of the thoracic spine.      Echo:    < from: Transthoracic Echocardiogram (07.05.17 @ 21:18) >    Conclusions:  1. 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 This is suspicious for vegetation  in the appropriate clinical setting.  At least moderate,  eccentric mitral regurgitation (may be underestimated)  2. Calcified trileaflet aortic valve with normal opening.  Mild-moderate aortic regurgitation.  3. Normal left ventricular systolic function. No segmental  wall motion abnormalities.  4. Normal right ventricular size and function.

## 2017-07-08 NOTE — PROGRESS NOTE ADULT - ASSESSMENT
82yoM, with DM, prostate CA s/p prostatectomy, ureteral artificial sphincter placement for incontinence found to have intermittent CHB on tele.    1. Intermittent CHB: Currently in Sinus Rhythm 40-60bpm  - given his history of prostate surgery there is a concern for endocarditis. Currently on Vanco/zosyn  Needs CARL on Monday and if no endocarditis he will need eventual PPM once cleared from ID standpoint.  Keep NPO after midnight on Sunday night for CARL on Monday   Avoid any AV isabella blockers    Contact details: Call [phone] on Saturday 7/8 untill 2pm. Covered by [phone] after-hours

## 2017-07-08 NOTE — PROGRESS NOTE ADULT - SUBJECTIVE AND OBJECTIVE BOX
24 HOUR EVENTS/ROS: No acute overnight events. Denies headaches, F/C, dizziness, syncope, CP/SOB, N/V, abdominal pain, dysuria, changes in BM, peripheral swelling, skin changes, new joint aches.   MEDICATIONS:  aspirin enteric coated 81 milliGRAM(s) Oral daily  hydrochlorothiazide 12.5 milliGRAM(s) Oral daily  heparin  Injectable 5000 Unit(s) SubCutaneous every 8 hours  losartan 25 milliGRAM(s) Oral daily  piperacillin/tazobactam IVPB. 3.375 Gram(s) IV Intermittent every 8 hours  vancomycin  IVPB 1000 milliGRAM(s) IV Intermittent every 12 hours  vancomycin  IVPB   IV Intermittent   docusate sodium 100 milliGRAM(s) Oral two times a day  atorvastatin 10 milliGRAM(s) Oral at bedtime  insulin lispro (HumaLOG) corrective regimen sliding scale   SubCutaneous three times a day before meals  insulin lispro (HumaLOG) corrective regimen sliding scale   SubCutaneous at bedtime  lactated ringers. 1000 milliLiter(s) IV Continuous <Continuous>    PHYSICAL EXAM:  T(C): 36.7 (17 @ 04:58), Max: 36.7 (17 @ 21:53)  HR: 51 (17 @ 04:58) (46 - 53)  BP: 155/58 (17 @ 04:58) (136/54 - 155/58)  RR: 18 (17 @ 04:58) (18 - 18)  SpO2: 94% (17 @ 04:58) (94% - 98%)  Wt(kg): --  Daily     Daily Weight in k.3 (2017 04:58)  I&O's Summary    2017 07:01  -  2017 07:00  --------------------------------------------------------  IN: 1200 mL / OUT: 2900 mL / NET: -1700 mL      TELEMETRY:     Appearance: elderly  male in NAD	  HEENT:   Normal oral mucosa, PERRL, EOMI	  Lymphatic: No lymphadenopathy  Cardiovascular: Normal S1 S2, No JVD, No murmurs, No edema  Respiratory: Lungs clear to auscultation	  Psychiatry: A & O x 3, Mood & affect appropriate  Gastrointestinal:  Soft, Non-tender, + BS  Genitourinary: catheter from ventral penis draining clear yellow fluid, no blood/clots around penile meatus  Skin: No rashes, No ecchymoses, No cyanosis	  Neurologic: Non-focal  MSK/Extremities: Normal range of motion, No clubbing, cyanosis or edema  Vascular: Peripheral pulses palpable 2+ bilaterally    LABS:	 	                        12.3   8.1   )-----------( 184      ( 2017 06:02 )             37.0         142  |  106  |  19  ----------------------------<  104<H>  3.7   |  27  |  1.09    Ca    8.7      2017 06:02      FS: CAPILLARY BLOOD GLUCOSE  107 (2017 22:40)  111 (2017 18:20)  92 (2017 12:31)  93 (2017 08:18) 24 HOUR EVENTS/ROS: No acute overnight events. Denies headaches, F/C, dizziness, syncope, CP/SOB, N/V, abdominal pain, dysuria, changes in BM, peripheral swelling, skin changes, new joint aches. Denies hematuria, suprapubic pressure/pain, palpitations.    MEDICATIONS:  aspirin enteric coated 81 milliGRAM(s) Oral daily  hydrochlorothiazide 12.5 milliGRAM(s) Oral daily  heparin  Injectable 5000 Unit(s) SubCutaneous every 8 hours  losartan 25 milliGRAM(s) Oral daily  piperacillin/tazobactam IVPB. 3.375 Gram(s) IV Intermittent every 8 hours  vancomycin  IVPB 1000 milliGRAM(s) IV Intermittent every 12 hours  vancomycin  IVPB   IV Intermittent   docusate sodium 100 milliGRAM(s) Oral two times a day  atorvastatin 10 milliGRAM(s) Oral at bedtime  insulin lispro (HumaLOG) corrective regimen sliding scale   SubCutaneous three times a day before meals  insulin lispro (HumaLOG) corrective regimen sliding scale   SubCutaneous at bedtime  lactated ringers. 1000 milliLiter(s) IV Continuous <Continuous>    PHYSICAL EXAM:  T(C): 36.7 (17 @ 04:58), Max: 36.7 (17 @ 21:53)  HR: 51 (17 @ 04:58) (46 - 53)  BP: 155/58 (17 @ 04:58) (136/54 - 155/58)  RR: 18 (17 @ 04:58) (18 - 18)  SpO2: 94% (17 @ 04:58) (94% - 98%)  Wt(kg): --  Daily     Daily Weight in k.3 (2017 04:58)  I&O's Summary    2017 07:01  -  2017 07:00  --------------------------------------------------------  IN: 1200 mL / OUT: 2900 mL / NET: -1700 mL      TELEMETRY: 1st degree HB 40s-50s, CHB around midnight to 35    Appearance: elderly  male in NAD	  HEENT:   Normal oral mucosa, PERRL, EOMI	  Lymphatic: No lymphadenopathy  Cardiovascular: slow rate, no JVD, no murmurs appreciated  Respiratory: Lungs clear to auscultation	  Psychiatry: A & O x 3, Mood & affect appropriate  Gastrointestinal:  Soft, Non-tender, + BS  Genitourinary: catheter from ventral penis draining clear yellow fluid, no blood/clots around penile meatus  Skin: No rashes, No ecchymoses, No cyanosis	  Neurologic: Non-focal  MSK/Extremities: Normal range of motion, No clubbing, cyanosis or edema  Vascular: Peripheral pulses palpable 2+ bilaterally    LABS:	 	                        12.6   8.8   )-----------( 179      ( 2017 08:29 )             38.6     07-08    141  |  105  |  13  ----------------------------<  91  3.6   |  26  |  0.89  07-07    142  |  106  |  19  ----------------------------<  104<H>  3.7   |  27  |  1.09    Ca    8.9      2017 08:29  Ca    8.7      2017 06:02  Phos  1.9     -  Mg     2.1     -      FS: CAPILLARY BLOOD GLUCOSE  95 (2017 08:48)  107 (2017 22:40)  111 (2017 18:20)  92 (2017 12:31)

## 2017-07-08 NOTE — PROGRESS NOTE ADULT - SUBJECTIVE AND OBJECTIVE BOX
HPI:  82yoM, with DM, prostate CA s/p prostatectomy, ureteral artificial sphincter placement for incontinence.   Patient admitted for sphincter removal. Yesterday he was noted to have episodes of complete heart block on tele. No syncope or lightheadedness at any time. He lives at [facility], but is functional and walks around.     No dyspnea or fatigue. No chest pain at any time.    Review Of Systems:  [X ] 10 point review of systems is otherwise negative except as mentioned above            [ ]Unable to obtain    Medications:  aspirin enteric coated 81 milliGRAM(s) Oral daily  atorvastatin 10 milliGRAM(s) Oral at bedtime  hydrochlorothiazide 12.5 milliGRAM(s) Oral daily  docusate sodium 100 milliGRAM(s) Oral two times a day  insulin lispro (HumaLOG) corrective regimen sliding scale   SubCutaneous three times a day before meals  insulin lispro (HumaLOG) corrective regimen sliding scale   SubCutaneous at bedtime  sodium chloride 0.9% lock flush 3 milliLiter(s) IV Push every 8 hours  heparin  Injectable 5000 Unit(s) SubCutaneous every 8 hours  losartan 25 milliGRAM(s) Oral daily  piperacillin/tazobactam IVPB. 3.375 Gram(s) IV Intermittent every 8 hours  lactated ringers. 1000 milliLiter(s) IV Continuous <Continuous>  vancomycin  IVPB 1000 milliGRAM(s) IV Intermittent every 12 hours  vancomycin  IVPB   IV Intermittent     PMH/PSH/FH/SH: [ ] Unchanged  Vitals:  T(C): 36.7 (17 @ 04:58), Max: 36.7 (17 @ 21:53)  HR: 51 (17 @ 04:58) (46 - 53)  BP: 155/58 (17 @ 04:58) (136/54 - 155/58)  BP(mean): --  RR: 18 (17 @ 04:58) (18 - 18)  SpO2: 94% (17 @ 04:58) (94% - 98%)  Wt(kg): --  Daily     Daily Weight in k.3 (2017 04:58)  I&O's Summary    2017 07:01  -  2017 07:00  --------------------------------------------------------  IN: 1200 mL / OUT: 2900 mL / NET: -1700 mL    2017 07:  -  2017 10:31  --------------------------------------------------------  IN: 0 mL / OUT: 375 mL / NET: -375 mL    Physical Exam:  Appearance: [ x] Normal [ x] NAD  Eyes: [ x] PERRL [x ] EOMI  HEENT: [x ] Normal oral muscosa [x ]NC/AT  Cardiovascular: [x ] S1 [x ] S2 [ x] RRR [ x] No m/r/g  [x]No edema, No JVD  Respiratory: [x ] Clear to auscultation bilaterally  Gastrointestinal: [x ] Soft [x ] Non-tender [x ] Non-distended [x ] BS+  Musculoskeletal: [x ] No clubbing [x ] No joint deformity   Neurologic: [x ] Non-focal  Lymphatic: [x ] No lymphadenopathy  Psychiatry: [x ] AAOx3 [x ] Mood & affect appropriate  Skin: [x ] No rashes [x ] No ecchymoses [x ] No cyanosis        141  |  105  |  13  ----------------------------<  91  3.6   |  26  |  0.89    Ca    8.9      2017 08:29  Phos  1.9     07-08  Mg     2.1     07-08    Interpretation of Telemetry: Sinus Rhythm 1st degree AV block 40-60bpm. Brief episode of weckenbach this morning (HR 45 bpm)

## 2017-07-08 NOTE — CONSULT NOTE ADULT - ASSESSMENT
Patient reports "slow heart beat" for years.     Recommend  -continue IV antibiotics but switch from Zosyn to Ceftriaxone   -agree with CARL  -obtain history from PMD regarding prior cardiac workup 82 y o male initially admitted with eroded artificial urinary sphincter, s/p extraction.  Found to be in 3rd degree hear block with echo concerning for mitral valve endocarditis.  Per Patient reports "slow heart beat" for years.   No fever or leucocytosis,   Cultures negative to date.      Recommend  -continue IV antibiotics but switch from Zosyn to Ceftriaxone  -continue with vancomycin, recheck trough.  -agree with CARL  -obtain history from PMD regarding prior cardiac workup  -ESR/CRP  -repeat blood cultures ordered.

## 2017-07-09 LAB
ANION GAP SERPL CALC-SCNC: 13 MMOL/L — SIGNIFICANT CHANGE UP (ref 5–17)
BUN SERPL-MCNC: 13 MG/DL — SIGNIFICANT CHANGE UP (ref 7–23)
CALCIUM SERPL-MCNC: 9.2 MG/DL — SIGNIFICANT CHANGE UP (ref 8.4–10.5)
CHLORIDE SERPL-SCNC: 101 MMOL/L — SIGNIFICANT CHANGE UP (ref 96–108)
CO2 SERPL-SCNC: 24 MMOL/L — SIGNIFICANT CHANGE UP (ref 22–31)
CREAT SERPL-MCNC: 1.01 MG/DL — SIGNIFICANT CHANGE UP (ref 0.5–1.3)
GLUCOSE SERPL-MCNC: 88 MG/DL — SIGNIFICANT CHANGE UP (ref 70–99)
HCT VFR BLD CALC: 40.9 % — SIGNIFICANT CHANGE UP (ref 39–50)
HGB BLD-MCNC: 13 G/DL — SIGNIFICANT CHANGE UP (ref 13–17)
MAGNESIUM SERPL-MCNC: 2 MG/DL — SIGNIFICANT CHANGE UP (ref 1.6–2.6)
MCHC RBC-ENTMCNC: 28.9 PG — SIGNIFICANT CHANGE UP (ref 27–34)
MCHC RBC-ENTMCNC: 31.8 GM/DL — LOW (ref 32–36)
MCV RBC AUTO: 90.8 FL — SIGNIFICANT CHANGE UP (ref 80–100)
PHOSPHATE SERPL-MCNC: 2.8 MG/DL — SIGNIFICANT CHANGE UP (ref 2.5–4.5)
PLATELET # BLD AUTO: 195 K/UL — SIGNIFICANT CHANGE UP (ref 150–400)
POTASSIUM SERPL-MCNC: 3.5 MMOL/L — SIGNIFICANT CHANGE UP (ref 3.5–5.3)
POTASSIUM SERPL-SCNC: 3.5 MMOL/L — SIGNIFICANT CHANGE UP (ref 3.5–5.3)
RBC # BLD: 4.5 M/UL — SIGNIFICANT CHANGE UP (ref 4.2–5.8)
RBC # FLD: 13.3 % — SIGNIFICANT CHANGE UP (ref 10.3–14.5)
SODIUM SERPL-SCNC: 138 MMOL/L — SIGNIFICANT CHANGE UP (ref 135–145)
VANCOMYCIN TROUGH SERPL-MCNC: 23.3 UG/ML — HIGH (ref 10–20)
WBC # BLD: 6.1 K/UL — SIGNIFICANT CHANGE UP (ref 3.8–10.5)
WBC # FLD AUTO: 6.1 K/UL — SIGNIFICANT CHANGE UP (ref 3.8–10.5)

## 2017-07-09 PROCEDURE — 99233 SBSQ HOSP IP/OBS HIGH 50: CPT

## 2017-07-09 RX ADMIN — CEFTRIAXONE 100 GRAM(S): 500 INJECTION, POWDER, FOR SOLUTION INTRAMUSCULAR; INTRAVENOUS at 16:23

## 2017-07-09 RX ADMIN — Medication 12.5 MILLIGRAM(S): at 05:50

## 2017-07-09 RX ADMIN — SODIUM CHLORIDE 3 MILLILITER(S): 9 INJECTION INTRAMUSCULAR; INTRAVENOUS; SUBCUTANEOUS at 05:52

## 2017-07-09 RX ADMIN — Medication 100 MILLIGRAM(S): at 16:24

## 2017-07-09 RX ADMIN — SODIUM CHLORIDE 3 MILLILITER(S): 9 INJECTION INTRAMUSCULAR; INTRAVENOUS; SUBCUTANEOUS at 13:14

## 2017-07-09 RX ADMIN — SENNA PLUS 2 TABLET(S): 8.6 TABLET ORAL at 21:38

## 2017-07-09 RX ADMIN — POLYETHYLENE GLYCOL 3350 17 GRAM(S): 17 POWDER, FOR SOLUTION ORAL at 11:48

## 2017-07-09 RX ADMIN — HEPARIN SODIUM 5000 UNIT(S): 5000 INJECTION INTRAVENOUS; SUBCUTANEOUS at 13:16

## 2017-07-09 RX ADMIN — Medication 81 MILLIGRAM(S): at 11:48

## 2017-07-09 RX ADMIN — LOSARTAN POTASSIUM 25 MILLIGRAM(S): 100 TABLET, FILM COATED ORAL at 05:50

## 2017-07-09 RX ADMIN — Medication 100 MILLIGRAM(S): at 05:50

## 2017-07-09 RX ADMIN — Medication 250 MILLIGRAM(S): at 09:11

## 2017-07-09 RX ADMIN — ATORVASTATIN CALCIUM 10 MILLIGRAM(S): 80 TABLET, FILM COATED ORAL at 21:38

## 2017-07-09 RX ADMIN — HEPARIN SODIUM 5000 UNIT(S): 5000 INJECTION INTRAVENOUS; SUBCUTANEOUS at 21:38

## 2017-07-09 RX ADMIN — HEPARIN SODIUM 5000 UNIT(S): 5000 INJECTION INTRAVENOUS; SUBCUTANEOUS at 05:50

## 2017-07-09 RX ADMIN — SODIUM CHLORIDE 3 MILLILITER(S): 9 INJECTION INTRAMUSCULAR; INTRAVENOUS; SUBCUTANEOUS at 21:41

## 2017-07-09 NOTE — PROGRESS NOTE ADULT - PROBLEM SELECTOR PLAN 1
-currently followed by EP; may be evaluated for PPM  -monitor on telemetry  -f/u EP recs -Appreciate EP reccs;  Plan for CARL tomorrow with PPM after if cleared by ID   -monitor on telemetry

## 2017-07-09 NOTE — PROGRESS NOTE ADULT - SUBJECTIVE AND OBJECTIVE BOX
24H hour events: no acute events. plan for CARL and possible PPM tomm.    MEDICATIONS:  aspirin enteric coated 81 milliGRAM(s) Oral daily  hydrochlorothiazide 12.5 milliGRAM(s) Oral daily  heparin  Injectable 5000 Unit(s) SubCutaneous every 8 hours  losartan 25 milliGRAM(s) Oral daily  vancomycin  IVPB 1000 milliGRAM(s) IV Intermittent every 12 hours  vancomycin  IVPB   IV Intermittent   cefTRIAXone   IVPB 2 Gram(s) IV Intermittent every 24 hours  docusate sodium 100 milliGRAM(s) Oral two times a day  senna 2 Tablet(s) Oral at bedtime  polyethylene glycol 3350 17 Gram(s) Oral daily  atorvastatin 10 milliGRAM(s) Oral at bedtime  insulin lispro (HumaLOG) corrective regimen sliding scale   SubCutaneous three times a day before meals  insulin lispro (HumaLOG) corrective regimen sliding scale   SubCutaneous at bedtime        REVIEW OF SYSTEMS:  Complete 10point ROS negative except as documented above.    PHYSICAL EXAM:  T(C): 36.4 (07-09-17 @ 05:21), Max: 36.6 (07-08-17 @ 21:17)  HR: 54 (07-09-17 @ 05:21) (48 - 54)  BP: 146/56 (07-09-17 @ 05:21) (135/71 - 152/72)  RR: 18 (07-09-17 @ 05:21) (18 - 18)  SpO2: 100% (07-09-17 @ 05:21) (95% - 100%)  Wt(kg): --  I&O's Summary    08 Jul 2017 07:01  -  09 Jul 2017 07:00  --------------------------------------------------------  IN: 1030 mL / OUT: 3500 mL / NET: -2470 mL        Appearance: Normal	  HEENT:   Normal oral mucosa, PERRL, EOMI	  Lymphatic: No lymphadenopathy  Cardiovascular: Normal S1 S2, No JVD, No murmurs, No edema  Respiratory: Lungs clear to auscultation	  Psychiatry: A & O x 3, Mood & affect appropriate  Gastrointestinal:  Soft, Non-tender, + BS	  Skin: No rashes, No ecchymoses, No cyanosis	  Neurologic: Non-focal  Extremities: Normal range of motion, No clubbing, cyanosis or edema  Vascular: Peripheral pulses palpable 2+ bilaterally        LABS:	 	    CBC Full  -  ( 09 Jul 2017 07:10 )  WBC Count : 6.1 K/uL  Hemoglobin : 13.0 g/dL  Hematocrit : 40.9 %  Platelet Count - Automated : 195 K/uL      07-09    138  |  101  |  13  ----------------------------<  88  3.5   |  24  |  1.01      TELEMETRY: SR 1st degree, intermittent CHB	      	  ASSESSMENT/PLAN: 	  82yoM, with DM, prostate CA s/p prostatectomy, ureteral artificial sphincter placement for incontinence found to have intermittent CHB on tele.    Intermittent CHB: Currently in Sinus Rhythm 40-60bpm  - given his history of prostate surgery there is a concern for endocarditis. Currently on Vanco/zosyn  -Needs CARL on Monday and if no endocarditis he will need eventual PPM once cleared from ID standpoint.  -Keep NPO after midnight on Sunday night for CARL on Monday   -Avoid any AV isabella blockers    Yeison Ferreira [id number], please call [phone] after 1pm

## 2017-07-09 NOTE — PROGRESS NOTE ADULT - SUBJECTIVE AND OBJECTIVE BOX
Chief Complaint: Urinary complaints/sphincter removal     24 HOUR EVENTS/ROS: No acute overnight events. Denies headaches, F/C, dizziness, syncope, CP/SOB, N/V, abdominal pain, dysuria, changes in BM, peripheral swelling, skin changes, new joint aches. Denies hematuria, suprapubic pressure/pain, palpitations.    MEDICATIONS  (STANDING):  aspirin enteric coated 81 milliGRAM(s) Oral daily  atorvastatin 10 milliGRAM(s) Oral at bedtime  hydrochlorothiazide 12.5 milliGRAM(s) Oral daily  docusate sodium 100 milliGRAM(s) Oral two times a day  insulin lispro (HumaLOG) corrective regimen sliding scale   SubCutaneous three times a day before meals  insulin lispro (HumaLOG) corrective regimen sliding scale   SubCutaneous at bedtime  sodium chloride 0.9% lock flush 3 milliLiter(s) IV Push every 8 hours  heparin  Injectable 5000 Unit(s) SubCutaneous every 8 hours  losartan 25 milliGRAM(s) Oral daily  vancomycin  IVPB 1000 milliGRAM(s) IV Intermittent every 12 hours  vancomycin  IVPB   IV Intermittent   senna 2 Tablet(s) Oral at bedtime  polyethylene glycol 3350 17 Gram(s) Oral daily  cefTRIAXone   IVPB 2 Gram(s) IV Intermittent every 24 hours    Vital Signs Last 24 Hrs  T(C): 36.4 (09 Jul 2017 05:21), Max: 36.6 (08 Jul 2017 21:17)  T(F): 97.6 (09 Jul 2017 05:21), Max: 97.8 (08 Jul 2017 21:17)  HR: 54 (09 Jul 2017 05:21) (48 - 54)  BP: 146/56 (09 Jul 2017 05:21) (135/71 - 152/72)  BP(mean): --  RR: 18 (09 Jul 2017 05:21) (18 - 18)  SpO2: 100% (09 Jul 2017 05:21) (95% - 100%)      TELEMETRY: 1st degree HB 40s-50s, CHB around midnight to 35    Appearance: elderly  male in NAD	  HEENT:   Normal oral mucosa, PERRL, EOMI	  Lymphatic: No lymphadenopathy  Cardiovascular: slow rate, no JVD, no murmurs appreciated  Respiratory: Lungs clear to auscultation	  Psychiatry: A & O x 3, Mood & affect appropriate  Gastrointestinal:  Soft, Non-tender, + BS  Genitourinary: catheter from ventral penis draining clear yellow fluid, no blood/clots around penile meatus  Skin: No rashes, No ecchymoses, No cyanosis	  Neurologic: Non-focal  MSK/Extremities: Normal range of motion, No clubbing, cyanosis or edema  Vascular: Peripheral pulses palpable 2+ bilaterally    LABS:	 	                                    12.6   8.8   )-----------( 179      ( 08 Jul 2017 08:29 )             38.6     07-09    138  |  101  |  13  ----------------------------<  88  3.5   |  24  |  1.01    Ca    9.2      09 Jul 2017 07:09  Phos  2.8     07-09  Mg     2.0     07-09          FS: CAPILLARY BLOOD GLUCOSE  95 (08 Jul 2017 08:48)  107 (07 Jul 2017 22:40)  111 (07 Jul 2017 18:20)  92 (07 Jul 2017 12:31) Chief Complaint: Urinary complaints/sphincter removal     24 HOUR EVENTS/ROS: No acute overnight events. Denies headaches, F/C, dizziness, syncope, CP/SOB, N/V, abdominal pain, dysuria, changes in BM, peripheral swelling, skin changes, new joint aches. On tele 1 degree 40-60 HR down to 29 briefly.       MEDICATIONS  (STANDING):  aspirin enteric coated 81 milliGRAM(s) Oral daily  atorvastatin 10 milliGRAM(s) Oral at bedtime  hydrochlorothiazide 12.5 milliGRAM(s) Oral daily  docusate sodium 100 milliGRAM(s) Oral two times a day  insulin lispro (HumaLOG) corrective regimen sliding scale   SubCutaneous three times a day before meals  insulin lispro (HumaLOG) corrective regimen sliding scale   SubCutaneous at bedtime  sodium chloride 0.9% lock flush 3 milliLiter(s) IV Push every 8 hours  heparin  Injectable 5000 Unit(s) SubCutaneous every 8 hours  losartan 25 milliGRAM(s) Oral daily  vancomycin  IVPB 1000 milliGRAM(s) IV Intermittent every 12 hours  vancomycin  IVPB   IV Intermittent   senna 2 Tablet(s) Oral at bedtime  polyethylene glycol 3350 17 Gram(s) Oral daily  cefTRIAXone   IVPB 2 Gram(s) IV Intermittent every 24 hours    Vital Signs Last 24 Hrs  T(C): 36.4 (09 Jul 2017 05:21), Max: 36.6 (08 Jul 2017 21:17)  T(F): 97.6 (09 Jul 2017 05:21), Max: 97.8 (08 Jul 2017 21:17)  HR: 54 (09 Jul 2017 05:21) (48 - 54)  BP: 146/56 (09 Jul 2017 05:21) (135/71 - 152/72)  BP(mean): --  RR: 18 (09 Jul 2017 05:21) (18 - 18)  SpO2: 100% (09 Jul 2017 05:21) (95% - 100%)      TELEMETRY: 1st degree HB 40s-50s, CHB around midnight to 35    Appearance: elderly  male in NAD	  HEENT:   Normal oral mucosa, PERRL, EOMI	  Lymphatic: No lymphadenopathy  Cardiovascular: slow rate, no JVD, no murmurs appreciated  Respiratory: Lungs clear to auscultation	  Psychiatry: A & O x 3, Mood & affect appropriate  Gastrointestinal:  Soft, Non-tender, + BS  Genitourinary: catheter from ventral penis draining clear yellow fluid, no blood/clots around penile meatus  Skin: No rashes, No ecchymoses, No cyanosis	  Neurologic: Non-focal  MSK/Extremities: Normal range of motion, No clubbing, cyanosis or edema  Vascular: Peripheral pulses palpable 2+ bilaterally    LABS:	 	                                    12.6   8.8   )-----------( 179      ( 08 Jul 2017 08:29 )             38.6     07-09    138  |  101  |  13  ----------------------------<  88  3.5   |  24  |  1.01    Ca    9.2      09 Jul 2017 07:09  Phos  2.8     07-09  Mg     2.0     07-09          FS: CAPILLARY BLOOD GLUCOSE  95 (08 Jul 2017 08:48)  107 (07 Jul 2017 22:40)  111 (07 Jul 2017 18:20)  92 (07 Jul 2017 12:31)

## 2017-07-09 NOTE — PROGRESS NOTE ADULT - PROBLEM SELECTOR PLAN 2
-findings suspicious for endocarditis on TTE - however, no other signs of endocarditis  -c/w vanc/ceftriaxone - appreciate ID reccs   -follow vanc troughs  -CARL Monday

## 2017-07-10 LAB
ANION GAP SERPL CALC-SCNC: 12 MMOL/L — SIGNIFICANT CHANGE UP (ref 5–17)
BUN SERPL-MCNC: 19 MG/DL — SIGNIFICANT CHANGE UP (ref 7–23)
CALCIUM SERPL-MCNC: 9.3 MG/DL — SIGNIFICANT CHANGE UP (ref 8.4–10.5)
CHLORIDE SERPL-SCNC: 103 MMOL/L — SIGNIFICANT CHANGE UP (ref 96–108)
CO2 SERPL-SCNC: 25 MMOL/L — SIGNIFICANT CHANGE UP (ref 22–31)
CREAT SERPL-MCNC: 0.97 MG/DL — SIGNIFICANT CHANGE UP (ref 0.5–1.3)
CULTURE RESULTS: SIGNIFICANT CHANGE UP
CULTURE RESULTS: SIGNIFICANT CHANGE UP
GLUCOSE SERPL-MCNC: 106 MG/DL — HIGH (ref 70–99)
HCT VFR BLD CALC: 42 % — SIGNIFICANT CHANGE UP (ref 39–50)
HGB BLD-MCNC: 12.7 G/DL — LOW (ref 13–17)
MAGNESIUM SERPL-MCNC: 2 MG/DL — SIGNIFICANT CHANGE UP (ref 1.6–2.6)
MCHC RBC-ENTMCNC: 27.6 PG — SIGNIFICANT CHANGE UP (ref 27–34)
MCHC RBC-ENTMCNC: 30.3 GM/DL — LOW (ref 32–36)
MCV RBC AUTO: 91.1 FL — SIGNIFICANT CHANGE UP (ref 80–100)
PHOSPHATE SERPL-MCNC: 3.3 MG/DL — SIGNIFICANT CHANGE UP (ref 2.5–4.5)
PLATELET # BLD AUTO: 216 K/UL — SIGNIFICANT CHANGE UP (ref 150–400)
POTASSIUM SERPL-MCNC: 3.5 MMOL/L — SIGNIFICANT CHANGE UP (ref 3.5–5.3)
POTASSIUM SERPL-SCNC: 3.5 MMOL/L — SIGNIFICANT CHANGE UP (ref 3.5–5.3)
RBC # BLD: 4.61 M/UL — SIGNIFICANT CHANGE UP (ref 4.2–5.8)
RBC # FLD: 13.7 % — SIGNIFICANT CHANGE UP (ref 10.3–14.5)
SODIUM SERPL-SCNC: 140 MMOL/L — SIGNIFICANT CHANGE UP (ref 135–145)
SPECIMEN SOURCE: SIGNIFICANT CHANGE UP
SPECIMEN SOURCE: SIGNIFICANT CHANGE UP
VANCOMYCIN TROUGH SERPL-MCNC: 12.9 UG/ML — SIGNIFICANT CHANGE UP (ref 10–20)
WBC # BLD: 6.8 K/UL — SIGNIFICANT CHANGE UP (ref 3.8–10.5)
WBC # FLD AUTO: 6.8 K/UL — SIGNIFICANT CHANGE UP (ref 3.8–10.5)

## 2017-07-10 PROCEDURE — 93312 ECHO TRANSESOPHAGEAL: CPT | Mod: 26

## 2017-07-10 PROCEDURE — 99232 SBSQ HOSP IP/OBS MODERATE 35: CPT

## 2017-07-10 PROCEDURE — 93320 DOPPLER ECHO COMPLETE: CPT | Mod: 26

## 2017-07-10 PROCEDURE — 93325 DOPPLER ECHO COLOR FLOW MAPG: CPT | Mod: 26

## 2017-07-10 PROCEDURE — 99233 SBSQ HOSP IP/OBS HIGH 50: CPT | Mod: GC

## 2017-07-10 RX ADMIN — Medication 10 MILLIGRAM(S): at 18:00

## 2017-07-10 RX ADMIN — Medication 12.5 MILLIGRAM(S): at 05:18

## 2017-07-10 RX ADMIN — Medication 100 MILLIGRAM(S): at 05:18

## 2017-07-10 RX ADMIN — SODIUM CHLORIDE 3 MILLILITER(S): 9 INJECTION INTRAMUSCULAR; INTRAVENOUS; SUBCUTANEOUS at 13:00

## 2017-07-10 RX ADMIN — HEPARIN SODIUM 5000 UNIT(S): 5000 INJECTION INTRAVENOUS; SUBCUTANEOUS at 21:54

## 2017-07-10 RX ADMIN — HEPARIN SODIUM 5000 UNIT(S): 5000 INJECTION INTRAVENOUS; SUBCUTANEOUS at 14:07

## 2017-07-10 RX ADMIN — HEPARIN SODIUM 5000 UNIT(S): 5000 INJECTION INTRAVENOUS; SUBCUTANEOUS at 05:18

## 2017-07-10 RX ADMIN — LOSARTAN POTASSIUM 25 MILLIGRAM(S): 100 TABLET, FILM COATED ORAL at 05:18

## 2017-07-10 RX ADMIN — CEFTRIAXONE 100 GRAM(S): 500 INJECTION, POWDER, FOR SOLUTION INTRAMUSCULAR; INTRAVENOUS at 17:05

## 2017-07-10 RX ADMIN — SENNA PLUS 2 TABLET(S): 8.6 TABLET ORAL at 21:54

## 2017-07-10 RX ADMIN — POLYETHYLENE GLYCOL 3350 17 GRAM(S): 17 POWDER, FOR SOLUTION ORAL at 12:39

## 2017-07-10 RX ADMIN — SODIUM CHLORIDE 3 MILLILITER(S): 9 INJECTION INTRAMUSCULAR; INTRAVENOUS; SUBCUTANEOUS at 05:23

## 2017-07-10 RX ADMIN — ATORVASTATIN CALCIUM 10 MILLIGRAM(S): 80 TABLET, FILM COATED ORAL at 21:54

## 2017-07-10 RX ADMIN — Medication 81 MILLIGRAM(S): at 12:38

## 2017-07-10 RX ADMIN — SODIUM CHLORIDE 3 MILLILITER(S): 9 INJECTION INTRAMUSCULAR; INTRAVENOUS; SUBCUTANEOUS at 21:54

## 2017-07-10 RX ADMIN — Medication 250 MILLIGRAM(S): at 12:38

## 2017-07-10 RX ADMIN — Medication 100 MILLIGRAM(S): at 17:07

## 2017-07-10 NOTE — PROGRESS NOTE ADULT - PROBLEM SELECTOR PLAN 1
-Plan for CARL today with possible PPM pending pt consent - currently unsure about surgery  -monitor on telemetry

## 2017-07-10 NOTE — PROVIDER CONTACT NOTE (OTHER) - RECOMMENDATIONS
Notify provider. EKG.
MD made aware.
Notify provider.
Notify provider.
hold vanco trough?, laxatives?
laxatives?
none

## 2017-07-10 NOTE — DISCHARGE NOTE ADULT - OTHER SIGNIFICANT FINDINGS
TTE:  Conclusions:  1. 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 This is suspicous for vegetation  in the appropriate clinical setting.  At least moderate,  eccentric mitral regurgitation (may be underestimated)  2. Calcified trileaflet aortic valve with normal opening.  Mild-moderate aortic regurgitation.  3. Normal left ventricular systolic function. No segmental  wall motion abnormalities.  4. Normal right ventricular size and function.  *** No previous Echo exam. Dr. Richter notified of  findings at 1130 AM on 7/5/17. CARL could better evaluate  the mitral and aortic valves if clinically indicated.  EF: 70-75%    7/10/17 CARL:  Conclusions:  1. Mitral valve prolapse involving the anterior mitral  leaflet. The A2 scallop of the anterior leaflet is  partially flail. A linear mobile echodensity consistent  with torn chordae is seen attached to the tip of the A2  scallop. Severe, eccentric, posteriorly-directed mitral  regurgitation.  2. Calcified trileaflet aortic valve with normal opening.  Moderate aortic regurgitation. Vena contractaabout 0.4-0.5  cm.  3. Normal aortic root and ascending aorta. Simple atheroma  noted in the aortic arch and descending aorta.  4. Left atrial enlargement. No left atrial or left atrial  appendage thrombus. Normal left atrial appendage function  (velocity>60 cm/s).  5. Normal left ventricular systolic function. No segmental  wall motion abnormalities.  6. Normal right ventricular size and function.  7. Agitated saline injection and color flow Doppler  demonstrate no evidence of a patent foramen ovale.  No definite vegetations seen. Partially flail A2 scallop of  the mitral valve. If clinical suspicion for endocarditis  remains high, consider repeat CARL in approximately 7-10  days. 7/5/17 TTE:  Conclusions:  1. 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 This is suspicous for vegetation  in the appropriate clinical setting.  At least moderate,  eccentric mitral regurgitation (may be underestimated)  2. Calcified trileaflet aortic valve with normal opening.  Mild-moderate aortic regurgitation.  3. Normal left ventricular systolic function. No segmental  wall motion abnormalities.  4. Normal right ventricular size and function.  *** No previous Echo exam. Dr. Richter notified of  findings at 1130 AM on 7/5/17. CARL could better evaluate  the mitral and aortic valves if clinically indicated.  EF: 70-75%    7/10/17 CARL:  Conclusions:  1. Mitral valve prolapse involving the anterior mitral  leaflet. The A2 scallop of the anterior leaflet is  partially flail. A linear mobile echodensity consistent  with torn chordae is seen attached to the tip of the A2  scallop. Severe, eccentric, posteriorly-directed mitral  regurgitation.  2. Calcified trileaflet aortic valve with normal opening.  Moderate aortic regurgitation. Vena contractaabout 0.4-0.5  cm.  3. Normal aortic root and ascending aorta. Simple atheroma  noted in the aortic arch and descending aorta.  4. Left atrial enlargement. No left atrial or left atrial  appendage thrombus. Normal left atrial appendage function  (velocity>60 cm/s).  5. Normal left ventricular systolic function. No segmental  wall motion abnormalities.  6. Normal right ventricular size and function.  7. Agitated saline injection and color flow Doppler  demonstrate no evidence of a patent foramen ovale.  No definite vegetations seen. Partially flail A2 scallop of  the mitral valve. If clinical suspicion for endocarditis  remains high, consider repeat CARL in approximately 7-10  days.

## 2017-07-10 NOTE — DISCHARGE NOTE ADULT - PROVIDER TOKENS
TOKEFREN:'[id number]',TOKEFREN:'[id number]' TOKEN:'[id number]',TOKEN:'[id number]',FREE:[LAST:[Jones],FIRST:[Nagi],PHONE:[phone]],FAX:[(   )    -],ADDRESS:[[address] [place] [address]   [place] [address]]]

## 2017-07-10 NOTE — DISCHARGE NOTE ADULT - REASON FOR ADMISSION
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

## 2017-07-10 NOTE — PROGRESS NOTE ADULT - ASSESSMENT
82 y o male initially admitted with eroded artificial urinary sphincter, s/p extraction.  Found to be in 3rd degree hear block with echo concerning for mitral valve endocarditis.  Per Patient reports "slow heart beat" for years.   No fever or leucocytosis,   Cultures negative to date.  Clinical suspicion for endocarditis low.      Recommend  -continue with vanco and ceftriaxone for now  -trough therapeutic  -plan for CARL today   -ESR/CRP slightly elevated  -all blood cultures negative to date.

## 2017-07-10 NOTE — DISCHARGE NOTE ADULT - HOSPITAL COURSE
82M with PMH of prostate cancer, s/p prostatectomy 1990s, s/p artificial urinary sphincter, HTN, HLD, DM type 2, is on the urology service after being found to have an eroded artificial urinary sphincter. S/p urological surgical removal of the device without acute complications. The patient did have a TTE (7/5) in the hospital which revealed a bileaflet mitral valve prolapse (anterior > posterior) with a focal thickening on the atrial side of the anterior mitral valve - suspicious for vegetation and possibly moderate MR. ECG showed sinus bradycardia with 1st degree AVB ( ms), with a non-specific intra-ventricular conduction block. Cardiology has been following the patient. This morning, the patient was found to have multiple asymptomatic telemetry episodes concerning for AV dissociation, possible 3rd degree heart block. The patient was asymptomatic during these episodes with a heart rate in 30s. Denies chest pain, SOB, lightheadedness and dizziness. Denies fever and chills. Medicine was consulted to transfer the patient to our service if cardiology and EP feel that he may be monitored safely on a medicine telemetry bed and not in an ICU setting.     Consults:  urology  PT  ID  cardiac electrophysiology 82M with PMH of prostate cancer, s/p prostatectomy 1990s, s/p artificial urinary sphincter, HTN, HLD, DM type 2, presented with an eroded artificial urinary sphincter. The sphincter was removed on 7/5/17 without acute complications. However, pt underwent TTE (7/5) due to EKG changes in the hospital which revealed a bileaflet mitral valve prolapse (anterior > posterior) with a focal thickening on the atrial side of the anterior mitral valve - suspicious for vegetation and possibly moderate MR. EKG showed sinus bradycardia with 1st degree AVB ( ms), with a non-specific intra-ventricular conduction block. Pt was started on vanc and zosyn for possible endocarditis, which was later switched to vanc and ceftriaxone based on ID recommendations. He did not have fevers, skin lesions, and blood/urine cultures were negative.    Over the hospital course, patient was found to have multiple asymptomatic telemetry episodes concerning for AV dissociation, possible 3rd degree heart block. The patient was asymptomatic during these episodes with a heart rate in 30s. Her underwent CARL on 7/10/17 and evaluation for PPM with electrophysiology.    Consults:  urology  PT  ID  cardiac electrophysiology 82M with PMH of prostate cancer, s/p prostatectomy 1990s, s/p artificial urinary sphincter, HTN, HLD, DM type 2, presented with an eroded artificial urinary sphincter. The sphincter was removed on 7/5/17 without acute complications. However, pt underwent TTE (7/5) due to EKG changes in the hospital which revealed a bileaflet mitral valve prolapse (anterior > posterior) with a focal thickening on the atrial side of the anterior mitral valve - suspicious for vegetation and possibly moderate MR. EKG showed sinus bradycardia with 1st degree AVB ( ms), with a non-specific intra-ventricular conduction block. Pt was started on vanc and zosyn for possible endocarditis, which was later switched to vanc and ceftriaxone based on ID recommendations. He did not have fevers, skin lesions, and blood/urine cultures were negative. CARL on 7/10/17 revealed no vegetations; pt antibiotics were DC'd with no complications.    Over the hospital course, patient was found to have multiple asymptomatic telemetry episodes concerning for AV dissociation, possible 3rd degree heart block. The patient was asymptomatic during these episodes with a heart rate in 30s. He underwent evaluation for PPM with electrophysiology, but did not wish to have PPM placement. [facility] for severe MR w/bileaflet prolapse; pt also refused this procedure. Risks and benefits were explained to the patient, including heart failure and sudden death; pt understood risks and did not wish to undergo further intervention. On 7/12 a family meeting took place; it was determined that ___.    At the time of discharge, the patient was hemodynamically stable, was tolerating PO diet, was voiding urine and passing stool, and was comfortable with adequate pain control. The patient was discharged to [place] with PT. He was instructed to follow up with EP, CTS, and urology within two weeks of discharge.     Consults:  Cardiology  Cardiothoracic Surgery  Electrophysiology  Infectious Disease  PT  Urology 82M with PMH of prostate cancer, s/p prostatectomy 1990s, s/p artificial urinary sphincter, HTN, HLD, DM type 2, presented with an eroded artificial urinary sphincter. The sphincter was removed on 7/5/17 without acute complications. However, pt underwent TTE (7/5) due to EKG changes in the hospital which revealed a bileaflet mitral valve prolapse (anterior > posterior) with a focal thickening on the atrial side of the anterior mitral valve - suspicious for vegetation and possibly moderate MR. EKG showed sinus bradycardia with 1st degree AVB ( ms), with a non-specific intra-ventricular conduction block. Pt was started on vanc and zosyn for possible endocarditis, which was later switched to vanc and ceftriaxone based on ID recommendations. He did not have fevers, skin lesions, and blood/urine cultures were negative. CARL on 7/10/17 revealed no vegetations; pt antibiotics were DC'd with no complications.    Over the hospital course, patient was found to have multiple asymptomatic telemetry episodes concerning for AV dissociation, possible 3rd degree heart block. The patient was asymptomatic during these episodes with a heart rate in 30s. He underwent evaluation for PPM with electrophysiology, but did not wish to have PPM placement. [facility] for severe MR w/bileaflet prolapse; pt also refused this procedure. Risks and benefits were explained to the patient, including heart failure and sudden death; pt understood risks and did not wish to undergo further intervention. On 7/12 a family meeting took place; it was determined that despite understanding the risks and benefits of not undergoing placement of a pacemaker or mitraclip, pt still did not wish to have surgical intervention.    At the time of discharge, the patient was hemodynamically stable, was tolerating PO diet, was voiding urine and passing stool, and was comfortable with adequate pain control. The patient was discharged to [place] with PT. He was instructed to follow up with EP, CTS, and urology within two weeks of discharge.     Consults:  Cardiology  Cardiothoracic Surgery  Electrophysiology  Infectious Disease  PT  Urology

## 2017-07-10 NOTE — DISCHARGE NOTE ADULT - CARE PLAN
Principal Discharge DX:	Heart block  Goal:	Follow up care  Instructions for follow-up, activity and diet:	After removal of your artifical urethral sphincter, it was noticed that you had heart block, or an irregular slowing of your heartrate on cardiac monitoring.  Secondary Diagnosis:	Erosion of urethra  Secondary Diagnosis:	Diabetes mellitus  Secondary Diagnosis:	Essential hypertension  Secondary Diagnosis:	Hyperlipidemia, unspecified hyperlipidemia type Principal Discharge DX:	Heart block  Goal:	Follow up care  Instructions for follow-up, activity and diet:	After removal of your artifical urethral sphincter, it was noticed that you had heart block, or an irregular slowing of your heartrate on cardiac monitoring. You also had a suspicious lesion on your echocardiogram (picture of the heart) that caused your medical team to suspect endocarditis - an infection of the heart. You were treated for this with antibiotics.  Secondary Diagnosis:	Erosion of urethra  Goal:	Follow up care  Instructions for follow-up, activity and diet:	You were admitted to the hospital because your artifical urethral sphincter had eroded into your urethra. It was successfully removed, and a Lemus (urinary) catheter was placed. Please do not remove this catheter at home. Follow up with Dr. King (your urologist) within one week of discharge. If you notice blood in your urine, severe pain in your penis or lower abdomen, clots around your penis, fevers, or chills, please seek emergency medical attention immediately.  Secondary Diagnosis:	Diabetes mellitus  Goal:	Maintain current regimen  Instructions for follow-up, activity and diet:	Your diabetes was well-controlled during your hospital stay. Please check your blood sugar regularly, and take diabetes medications as prescribed. If your blood sugar is consistently high (>180), or you feel extreme dizziness, thirstiness, or frequent urination, please seek emergency medical attention immediately.  Secondary Diagnosis:	Essential hypertension  Goal:	Maintain current regimen  Instructions for follow-up, activity and diet:	Your high blood pressure was well-controlled during your hospital stay. Please check your blood pressure regularly and take your blood pressure medications as prescribed. If it is consistently high (SBP >180 or DBP >90), or you experience intractable headaches, dizziness, changes in vision, chest pain, difficulty breathing, or movement difficulty, please seek emergency medical attention immediately.  Secondary Diagnosis:	Hyperlipidemia, unspecified hyperlipidemia type  Goal:	Maintain current regimen  Instructions for follow-up, activity and diet:	Please continue taking your cholesterol medications. Please follow up with your PMD within two weeks. Principal Discharge DX:	Heart block  Goal:	Follow up care  Instructions for follow-up, activity and diet:	After removal of your artifical urethral sphincter, it was noticed that you had heart block, or an irregular slowing of your heartrate on cardiac monitoring. You also had a suspicious lesion on your echocardiogram (picture of the heart) that caused your medical team to suspect endocarditis - an infection of the heart that was ruled out with your CARL (picture of the heart). You were treated with antibiotics, and the cardiac surgery team came to evaluate you. Please follow up with them in their office (contact below). If you experience severe chest pain, shortness of breath, dizziness, feel your heart is beating too slowly, or feel you are about to pass out, please seek medical attention immediately.  Secondary Diagnosis:	Erosion of urethra  Goal:	Follow up care  Instructions for follow-up, activity and diet:	You were admitted to the hospital because your artifical urethral sphincter had eroded into your urethra. It was successfully removed, and a Lemus (urinary) catheter was placed. Please do not remove this catheter at home. Follow up with Dr. King (your urologist) within one week of discharge. If you notice blood in your urine, severe pain in your penis or lower abdomen, clots around your penis, fevers, or chills, please seek emergency medical attention immediately.  Secondary Diagnosis:	Diabetes mellitus  Goal:	Maintain current regimen  Instructions for follow-up, activity and diet:	Your diabetes was well-controlled during your hospital stay. Please check your blood sugar regularly, and take diabetes medications as prescribed. If your blood sugar is consistently high (>180), or you feel extreme dizziness, thirstiness, or frequent urination, please seek emergency medical attention immediately. Please follow up with your PMD within two weeks.  Secondary Diagnosis:	Essential hypertension  Goal:	Maintain current regimen  Instructions for follow-up, activity and diet:	Your high blood pressure was well-controlled during your hospital stay. Please check your blood pressure regularly and take your blood pressure medications as prescribed. If it is consistently high (SBP >180 or DBP >90), or you experience intractable headaches, dizziness, changes in vision, chest pain, difficulty breathing, or movement difficulty, please seek emergency medical attention immediately. Please follow up with your PMD within two weeks.  Secondary Diagnosis:	Hyperlipidemia, unspecified hyperlipidemia type  Goal:	Maintain current regimen  Instructions for follow-up, activity and diet:	Please continue taking your cholesterol medications. Please follow up with your PMD within two weeks. Principal Discharge DX:	Heart block  Goal:	Follow up care  Instructions for follow-up, activity and diet:	After removal of your artifical urethral sphincter, it was noticed that you had heart block, or an irregular slowing of your heartrate on cardiac monitoring. You also had a suspicious lesion on your echocardiogram (picture of the heart) that caused your medical team to suspect endocarditis - an infection of the heart that was ruled out with your CARL (picture of the heart). You were treated with antibiotics, and the cardiac surgery team came to evaluate you. Please follow up with them in their office (contact below). If you experience severe chest pain, shortness of breath, dizziness, feel your heart is beating too slowly, or feel you are about to pass out, please seek medical attention immediately.  Secondary Diagnosis:	Erosion of urethra  Goal:	Follow up care  Instructions for follow-up, activity and diet:	You were admitted to the hospital because your artifical urethral sphincter had eroded into your urethra. It was successfully removed, and a Lemus (urinary) catheter was placed. Please do not remove this catheter at home. Follow up with Dr. King (your urologist) within one week of discharge. If you notice blood in your urine, severe pain in your penis or lower abdomen, clots around your penis, fevers, or chills, please seek emergency medical attention immediately.  Secondary Diagnosis:	Diabetes mellitus  Goal:	Maintain current regimen  Instructions for follow-up, activity and diet:	Your diabetes was well-controlled during your hospital stay. Please check your blood sugar regularly, and take diabetes medications as prescribed. If your blood sugar is consistently high (>180), or you feel extreme dizziness, thirstiness, or frequent urination, please seek emergency medical attention immediately. Please follow up with your PMD within two weeks.  Secondary Diagnosis:	Essential hypertension  Goal:	Maintain current regimen  Instructions for follow-up, activity and diet:	Your high blood pressure was well-controlled during your hospital stay. Please check your blood pressure regularly and take your blood pressure medications (HCTZ, losartan) as prescribed. If it is consistently high (SBP >180 or DBP >90), or you experience intractable headaches, dizziness, changes in vision, chest pain, difficulty breathing, or movement difficulty, please seek emergency medical attention immediately. Please follow up with your PMD within two weeks.  Secondary Diagnosis:	Hyperlipidemia, unspecified hyperlipidemia type  Goal:	Maintain current regimen  Instructions for follow-up, activity and diet:	Please continue taking your cholesterol medications. Please follow up with your PMD within two weeks. Principal Discharge DX:	Heart block  Goal:	Follow up care  Instructions for follow-up, activity and diet:	After removal of your artifical urethral sphincter, it was noticed that you had heart block, or an irregular slowing of your heartrate on cardiac monitoring. You also had a suspicious lesion on your echocardiogram (picture of the heart) that caused your medical team to suspect endocarditis - an infection of the heart that was ruled out with your CARL (picture of the heart). You were treated with antibiotics, and the cardiac surgery team came to evaluate you. Please follow up with them in their office (contact below). If you experience severe chest pain, shortness of breath, dizziness, feel your heart is beating too slowly, or feel you are about to pass out, please seek medical attention immediately.  Secondary Diagnosis:	Erosion of urethra  Goal:	Follow up care  Instructions for follow-up, activity and diet:	You were admitted to the hospital because your artifical urethral sphincter had eroded into your urethra. It was successfully removed, and a Lemus (urinary) catheter was placed. Please do not remove this catheter at home. Follow up with Dr. King (your urologist) within one week of discharge. If you notice blood in your urine, severe pain in your penis or lower abdomen, clots around your penis, fevers, or chills, please seek emergency medical attention immediately.  Secondary Diagnosis:	Diabetes mellitus  Goal:	Maintain current regimen  Instructions for follow-up, activity and diet:	Your diabetes was well-controlled during your hospital stay. Please check your blood sugar regularly, and take diabetes medications as prescribed. If your blood sugar is consistently high (>180), or you feel extreme dizziness, thirstiness, or frequent urination, please seek emergency medical attention immediately. Please follow up with your PMD within two weeks.  Secondary Diagnosis:	Essential hypertension  Goal:	Maintain current regimen  Instructions for follow-up, activity and diet:	Your high blood pressure was well-controlled during your hospital stay. Please check your blood pressure regularly and take your blood pressure medications (HCTZ, losartan) as prescribed. If it is consistently high (SBP >180 or DBP >90), or you experience intractable headaches, dizziness, changes in vision, chest pain, difficulty breathing, or movement difficulty, please seek emergency medical attention immediately. Please follow up with your PMD within two weeks.  Secondary Diagnosis:	Hyperlipidemia, unspecified hyperlipidemia type  Goal:	Maintain current regimen  Instructions for follow-up, activity and diet:	Please continue taking your cholesterol medications. Please follow up with your PMD within two weeks.  Secondary Diagnosis:	Non-rheumatic mitral regurgitation  Goal:	Follow up Care  Instructions for follow-up, activity and diet:	You were found to have severe mitral regurgitation (leaky valve) while in the hospital. You were offered a mitraclip to help repair this valve, however after careful discussion you have decided to decline this intervention. Please follow up with your doctor regarding future care. If you experience symptoms of shortness of breath, chest pain, difficulty breathing please notify your doctor immediately.

## 2017-07-10 NOTE — PROGRESS NOTE ADULT - SUBJECTIVE AND OBJECTIVE BOX
82y old  Male who presents with a chief complaint of eroded artificial urethral sphincter (10 Jul 2017 17:43)      Interval history:  Afebrile, no N/V/D, no abdominal pain, no cough, no BM per pt for last few days.        Antimicrobials:    vancomycin  IVPB 1000 milliGRAM(s) IV Intermittent every 12 hours  cefTRIAXone   IVPB 2 Gram(s) IV Intermittent every 24 hours    REVIEW OF SYSTEMS:  As above.    Vital Signs Last 24 Hrs  T(C): 36.3 (07-10-17 @ 12:57), Max: 36.8 (07-09-17 @ 21:56)  T(F): 97.4 (07-10-17 @ 12:57), Max: 98.2 (07-09-17 @ 21:56)  HR: 60 (07-10-17 @ 12:57) (53 - 60)  BP: 126/51 (07-10-17 @ 12:57) (110/61 - 147/72)  RR: 18 (07-10-17 @ 12:57) (18 - 18)  SpO2: 99% (07-10-17 @ 12:57) (98% - 99%)    PHYSICAL EXAM:  Pleasant patient in no acute distress. AAOX3.  Cardiovascular: S1S2 normal. + murmur  Lungs: Good air entry B/L lung fields.  Gastrointestinal: soft, nontender, nondistended. some ecchymosis on rt groin area with steristrips.  Extremities: no edema.  +monte                             12.7   6.8   )-----------( 216      ( 10 Jul 2017 06:04 )             42.0   07-10    140  |  103  |  19  ----------------------------<  106<H>  3.5   |  25  |  0.97    Ca    9.3      10 Jul 2017 06:04  Phos  3.3     07-10  Mg     2.0     07-10      RECENT CULTURES:  Culture - Blood in AM (07.09.17 @ 08:24)  No growth to date.

## 2017-07-10 NOTE — PROGRESS NOTE ADULT - ASSESSMENT
82M s/p AUS explant found to have asymptomatic bradycardia and thickened MV on TTE.  -c/w antibiotics, f/up ID. Would prefer coverage with antibiotics through 7/12 from AUS removal.  -f/u cardiology and medicine  -bowel regimen, enema x1 ordered  -patient should continue catheter x1 month from surgery (through August 2nd), follow up in 3 weeks for retrograde urethrogram, catheter removal/change. Dr. Uriel King, [facility], [phone]  --please call with questions, will sign off, [phone]

## 2017-07-10 NOTE — DISCHARGE NOTE ADULT - CARE PROVIDER_API CALL
Uriel King), Urology  [address] [place] [address]  Phone: [phone]  Fax: [phone]    Bryce Arrieta), Cardiac Electrophysiology; Cardiology; Internal Medicine  [address] [place] [address]  Phone: [phone]  Fax: [phone] Uriel King), Urology  [address] [place] [address]  Phone: [phone]  Fax: [phone]    Bryce Arrieta (MD), Cardiac Electrophysiology; Cardiology; Internal Medicine  [address] [place] [address]  Phone: [phone]  Fax: [phone]    Nagi Jones  [address] [place] [address]  Phone: [phone]  Fax: (   )    -

## 2017-07-10 NOTE — PROGRESS NOTE ADULT - SUBJECTIVE AND OBJECTIVE BOX
Subjective    Patient seen and examined. Denies pain. No fevers. Underwent CARL today. Notes no bowel movement for several days.     Objective    Vital signs  T(F): , Max: 98.2 (07-09-17 @ 21:56)  HR: 60 (07-10-17 @ 12:57)  BP: 126/51 (07-10-17 @ 12:57)  SpO2: 99% (07-10-17 @ 12:57)  Wt(kg): --    Output     07-09 @ 07:01  -  07-10 @ 07:00  --------------------------------------------------------  IN: 660 mL / OUT: 950 mL / NET: -290 mL    07-10 @ 07:01  -  07-10 @ 18:39  --------------------------------------------------------  IN: 240 mL / OUT: 700 mL / NET: -460 mL        Gen--NAD  Abd--soft/nt/nd  ---monte in place, clear yellow urine, +urethral erosion, incision c/d/i     Labs      07-10 @ 06:04    WBC 6.8   / Hct 42.0  / SCr 0.97     07-09 @ 07:10    WBC 6.1   / Hct 40.9  / SCr --

## 2017-07-10 NOTE — DISCHARGE NOTE ADULT - PATIENT PORTAL LINK FT
“You can access the Follow[facility] Patient Portal, offered by [facility], by registering with the following website: [email or web address]”

## 2017-07-10 NOTE — DISCHARGE NOTE ADULT - MEDICATION SUMMARY - MEDICATIONS TO TAKE
I will START or STAY ON the medications listed below when I get home from the hospital:    aspirin 81 mg oral tablet  -- 1 tab(s) by mouth once a day  -- Indication: For Preventive measure    losartan 25 mg oral tablet  -- 1 tab(s) by mouth once a day  -- Indication: For Essential hypertension    Lipitor 10 mg oral tablet  -- 1 tab(s) by mouth once a day  -- Indication: For Coronary artery disease    hydroCHLOROthiazide 12.5 mg oral tablet  -- 1 tab(s) by mouth once a day  -- Indication: For Essential hypertension    docusate sodium 100 mg oral capsule  -- 1 cap(s) by mouth 2 times a day  -- Indication: For Constipation    senna oral tablet  -- 2 tab(s) by mouth once a day (at bedtime)  -- Indication: For Constipation

## 2017-07-10 NOTE — PROGRESS NOTE ADULT - SUBJECTIVE AND OBJECTIVE BOX
24 HOUR EVENTS/ROS: No acute events overnight. Pt reports constipation. Denies palpitations, chest pain, SOB, N/V, hematuria, dysuria.     MEDICATIONS:  aspirin enteric coated 81 milliGRAM(s) Oral daily  hydrochlorothiazide 12.5 milliGRAM(s) Oral daily  heparin  Injectable 5000 Unit(s) SubCutaneous every 8 hours  losartan 25 milliGRAM(s) Oral daily  vancomycin  IVPB 1000 milliGRAM(s) IV Intermittent every 12 hours  vancomycin  IVPB   IV Intermittent   cefTRIAXone   IVPB 2 Gram(s) IV Intermittent every 24 hours  docusate sodium 100 milliGRAM(s) Oral two times a day  senna 2 Tablet(s) Oral at bedtime  polyethylene glycol 3350 17 Gram(s) Oral daily  atorvastatin 10 milliGRAM(s) Oral at bedtime  insulin lispro (HumaLOG) corrective regimen sliding scale   SubCutaneous three times a day before meals  insulin lispro (HumaLOG) corrective regimen sliding scale   SubCutaneous at bedtime    PHYSICAL EXAM:  T(C): 36.4 (07-10-17 @ 04:40), Max: 36.8 (17 @ 12:36)  HR: 53 (07-10-17 @ 04:40) (53 - 54)  BP: 147/72 (07-10-17 @ 04:40) (110/61 - 147/72)  RR: 18 (07-10-17 @ 04:40) (18 - 18)  SpO2: 99% (07-10-17 @ 04:40) (96% - 99%)  Daily Weight in k.7 (10 Jul 2017 04:40)  I&O's Summary    2017 07:01  -  10 Jul 2017 07:00  --------------------------------------------------------  IN: 660 mL / OUT: 950 mL / NET: -290 mL    TELEMETRY: bradycardia 40s-50s 1st degree block, one episode CHB to 30s    Appearance: elderly  male in NAD	  HEENT:   Normal oral mucosa, PERRL, EOMI	  Lymphatic: No lymphadenopathy  Cardiovascular: slow rate, no JVD, no murmurs appreciated  Respiratory: Lungs clear to auscultation	  Psychiatry: A & O x 3, Mood & affect appropriate  Gastrointestinal:  Soft, Non-tender, + BS  Genitourinary: catheter from ventral penis draining clear yellow fluid, no blood/clots around penile meatus  Skin: No rashes, No ecchymoses, No cyanosis	  Neurologic: Non-focal  MSK/Extremities: Normal range of motion, No clubbing, cyanosis or edema  Vascular: Peripheral pulses palpable 2+ bilaterally      LABS:	 	                        12.7   6.8   )-----------( 216      ( 10 Jul 2017 06:04 )             42.0     07-10    140  |  103  |  19  ----------------------------<  106<H>  3.5   |  25  |  0.97  07-09    138  |  101  |  13  ----------------------------<  88  3.5   |  24  |  1.01    Ca    9.3      10 Jul 2017 06:04  Ca    9.2      2017 07:09  Phos  3.3     07-10  Phos  2.8     07-09  Mg     2.0     07-10  Mg     2.0     07-09    FS: CAPILLARY BLOOD GLUCOSE  115 (10 Jul 2017 08:18)  157 (2017 22:08)  115 (2017 18:00)  132 (2017 12:36) 24 HOUR EVENTS/ROS: No acute events overnight. Pt reports constipation. Denies palpitations, chest pain, SOB, N/V, hematuria, dysuria.         CC: heart block    MEDICATIONS:  aspirin enteric coated 81 milliGRAM(s) Oral daily  hydrochlorothiazide 12.5 milliGRAM(s) Oral daily  heparin  Injectable 5000 Unit(s) SubCutaneous every 8 hours  losartan 25 milliGRAM(s) Oral daily  vancomycin  IVPB 1000 milliGRAM(s) IV Intermittent every 12 hours  vancomycin  IVPB   IV Intermittent   cefTRIAXone   IVPB 2 Gram(s) IV Intermittent every 24 hours  docusate sodium 100 milliGRAM(s) Oral two times a day  senna 2 Tablet(s) Oral at bedtime  polyethylene glycol 3350 17 Gram(s) Oral daily  atorvastatin 10 milliGRAM(s) Oral at bedtime  insulin lispro (HumaLOG) corrective regimen sliding scale   SubCutaneous three times a day before meals  insulin lispro (HumaLOG) corrective regimen sliding scale   SubCutaneous at bedtime    PHYSICAL EXAM:  T(C): 36.4 (07-10-17 @ 04:40), Max: 36.8 (17 @ 12:36)  HR: 53 (07-10-17 @ 04:40) (53 - 54)  BP: 147/72 (07-10-17 @ 04:40) (110/61 - 147/72)  RR: 18 (07-10-17 @ 04:40) (18 - 18)  SpO2: 99% (07-10-17 @ 04:40) (96% - 99%)  Daily Weight in k.7 (10 Jul 2017 04:40)  I&O's Summary    2017 07:01  -  10 Jul 2017 07:00  --------------------------------------------------------  IN: 660 mL / OUT: 950 mL / NET: -290 mL    TELEMETRY: bradycardia 40s-50s 1st degree block, one episode CHB to 30s    Appearance: elderly  male in NAD	  HEENT:   Normal oral mucosa, PERRL, EOMI	  Lymphatic: No lymphadenopathy  Cardiovascular: slow rate, no JVD, no murmurs appreciated  Respiratory: Lungs clear to auscultation	  Psychiatry: A & O x 3, Mood & affect appropriate  Gastrointestinal:  Soft, Non-tender, + BS  Genitourinary: catheter from ventral penis draining clear yellow fluid, no blood/clots around penile meatus  Skin: No rashes, No ecchymoses, No cyanosis	  Neurologic: Non-focal  MSK/Extremities: Normal range of motion, No clubbing, cyanosis or edema  Vascular: Peripheral pulses palpable 2+ bilaterally      LABS:	 	                        12.7   6.8   )-----------( 216      ( 10 Jul 2017 06:04 )             42.0     07-10    140  |  103  |  19  ----------------------------<  106<H>  3.5   |  25  |  0.97  07-09    138  |  101  |  13  ----------------------------<  88  3.5   |  24  |  1.01    Ca    9.3      10 Jul 2017 06:04  Ca    9.2      2017 07:09  Phos  3.3     07-10  Phos  2.8     07-09  Mg     2.0     07-10  Mg     2.0     07-09    FS: CAPILLARY BLOOD GLUCOSE  115 (10 Jul 2017 08:18)  157 (2017 22:08)  115 (2017 18:00)  132 (2017 12:36)      consultant notes reviewed: cards/EP

## 2017-07-10 NOTE — DISCHARGE NOTE ADULT - CARE PROVIDERS DIRECT ADDRESSES
[email or web address]_Unknown

## 2017-07-10 NOTE — DISCHARGE NOTE ADULT - SECONDARY DIAGNOSIS.
Erosion of urethra Diabetes mellitus Essential hypertension Hyperlipidemia, unspecified hyperlipidemia type Non-rheumatic mitral regurgitation

## 2017-07-10 NOTE — PROGRESS NOTE ADULT - PROBLEM SELECTOR PLAN 2
-findings suspicious for endocarditis on TTE - however, no other signs of endocarditis  -c/w vanc/ceftriaxone - appreciate ID reccs   -follow vanc troughs  -CARL today

## 2017-07-10 NOTE — DISCHARGE NOTE ADULT - PLAN OF CARE
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 Maintain current regimen Your diabetes was well-controlled during your hospital stay. Please check your blood sugar regularly, and take diabetes medications as prescribed. If your blood sugar is consistently high (>180), or you feel extreme dizziness, thirstiness, or frequent urination, please seek emergency medical attention immediately. Your high blood pressure was well-controlled during your hospital stay. Please check your blood pressure regularly and take your blood pressure medications as prescribed. If it is consistently high (SBP >180 or DBP >90), or you experience intractable headaches, dizziness, changes in vision, chest pain, difficulty breathing, or movement difficulty, please seek emergency medical attention immediately. Please continue taking your cholesterol medications. Please follow up with your PMD within two weeks. 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 It was successfully removed, and a Lemus (urinary) catheter was placed. Please do not remove this catheter at home. Follow up with Dr. King (your urologist) within one week of discharge. If you notice blood in your urine, severe pain in your penis or lower abdomen, clots around your penis, fevers, or chills, please seek emergency medical attention immediately. After removal of your artifical urethral sphincter, it was noticed that you had heart block, or an irregular slowing of your heartrate on cardiac monitoring. You also had a suspicious lesion on your echocardiogram (picture of the heart) that caused your medical team to suspect endocarditis - an infection of the heart. You were treated for this with antibiotics. Your diabetes was well-controlled during your hospital stay. Please check your blood sugar regularly, and take diabetes medications as prescribed. If your blood sugar is consistently high (>180), or you feel extreme dizziness, thirstiness, or frequent urination, please seek emergency medical attention immediately. Please follow up with your PMD within two weeks. Your high blood pressure was well-controlled during your hospital stay. Please check your blood pressure regularly and take your blood pressure medications as prescribed. If it is consistently high (SBP >180 or DBP >90), or you experience intractable headaches, dizziness, changes in vision, chest pain, difficulty breathing, or movement difficulty, please seek emergency medical attention immediately. Please follow up with your PMD within two weeks. After removal of your artifical urethral sphincter, it was noticed that you had heart block, or an irregular slowing of your heartrate on cardiac monitoring. You also had a suspicious lesion on your echocardiogram (picture of the heart) that caused your medical team to suspect endocarditis - an infection of the heart that was ruled out with your CARL (picture of the heart). You were treated with antibiotics, and the cardiac surgery team came to evaluate you. Please follow up with them in their office (contact below). If you experience severe chest pain, shortness of breath, dizziness, feel your heart is beating too slowly, or feel you are about to pass out, please seek medical attention immediately. Your high blood pressure was well-controlled during your hospital stay. Please check your blood pressure regularly and take your blood pressure medications (HCTZ, losartan) as prescribed. If it is consistently high (SBP >180 or DBP >90), or you experience intractable headaches, dizziness, changes in vision, chest pain, difficulty breathing, or movement difficulty, please seek emergency medical attention immediately. Please follow up with your PMD within two weeks. Follow up Care You were found to have severe mitral regurgitation (leaky valve) while in the hospital. You were offered a mitraclip to help repair this valve, however after careful discussion you have decided to decline this intervention. Please follow up with your doctor regarding future care. If you experience symptoms of shortness of breath, chest pain, difficulty breathing please notify your doctor immediately.

## 2017-07-10 NOTE — DISCHARGE NOTE ADULT - MEDICATION SUMMARY - MEDICATIONS TO CHANGE
I will SWITCH the dose or number of times a day I take the medications listed below when I get home from the hospital:    Cozaar 50 mg oral tablet  -- 1 tab(s) by mouth once a day

## 2017-07-11 LAB — VANCOMYCIN TROUGH SERPL-MCNC: 18 UG/ML — SIGNIFICANT CHANGE UP (ref 10–20)

## 2017-07-11 PROCEDURE — 99233 SBSQ HOSP IP/OBS HIGH 50: CPT | Mod: GC

## 2017-07-11 PROCEDURE — 99232 SBSQ HOSP IP/OBS MODERATE 35: CPT

## 2017-07-11 PROCEDURE — 99233 SBSQ HOSP IP/OBS HIGH 50: CPT

## 2017-07-11 RX ORDER — INSULIN LISPRO 100/ML
VIAL (ML) SUBCUTANEOUS
Qty: 0 | Refills: 0 | Status: DISCONTINUED | OUTPATIENT
Start: 2017-07-11 | End: 2017-07-14

## 2017-07-11 RX ORDER — DEXTROSE 50 % IN WATER 50 %
12.5 SYRINGE (ML) INTRAVENOUS ONCE
Qty: 0 | Refills: 0 | Status: DISCONTINUED | OUTPATIENT
Start: 2017-07-11 | End: 2017-07-14

## 2017-07-11 RX ORDER — INSULIN LISPRO 100/ML
VIAL (ML) SUBCUTANEOUS AT BEDTIME
Qty: 0 | Refills: 0 | Status: DISCONTINUED | OUTPATIENT
Start: 2017-07-11 | End: 2017-07-14

## 2017-07-11 RX ORDER — DEXTROSE 50 % IN WATER 50 %
25 SYRINGE (ML) INTRAVENOUS ONCE
Qty: 0 | Refills: 0 | Status: DISCONTINUED | OUTPATIENT
Start: 2017-07-11 | End: 2017-07-14

## 2017-07-11 RX ORDER — DEXTROSE 50 % IN WATER 50 %
1 SYRINGE (ML) INTRAVENOUS ONCE
Qty: 0 | Refills: 0 | Status: DISCONTINUED | OUTPATIENT
Start: 2017-07-11 | End: 2017-07-14

## 2017-07-11 RX ORDER — SODIUM CHLORIDE 9 MG/ML
1000 INJECTION, SOLUTION INTRAVENOUS
Qty: 0 | Refills: 0 | Status: DISCONTINUED | OUTPATIENT
Start: 2017-07-11 | End: 2017-07-14

## 2017-07-11 RX ORDER — GLUCAGON INJECTION, SOLUTION 0.5 MG/.1ML
1 INJECTION, SOLUTION SUBCUTANEOUS ONCE
Qty: 0 | Refills: 0 | Status: DISCONTINUED | OUTPATIENT
Start: 2017-07-11 | End: 2017-07-14

## 2017-07-11 RX ADMIN — ATORVASTATIN CALCIUM 10 MILLIGRAM(S): 80 TABLET, FILM COATED ORAL at 21:40

## 2017-07-11 RX ADMIN — Medication 100 MILLIGRAM(S): at 05:10

## 2017-07-11 RX ADMIN — SENNA PLUS 2 TABLET(S): 8.6 TABLET ORAL at 21:40

## 2017-07-11 RX ADMIN — Medication 81 MILLIGRAM(S): at 13:32

## 2017-07-11 RX ADMIN — SODIUM CHLORIDE 3 MILLILITER(S): 9 INJECTION INTRAMUSCULAR; INTRAVENOUS; SUBCUTANEOUS at 13:41

## 2017-07-11 RX ADMIN — SODIUM CHLORIDE 3 MILLILITER(S): 9 INJECTION INTRAMUSCULAR; INTRAVENOUS; SUBCUTANEOUS at 05:05

## 2017-07-11 RX ADMIN — SODIUM CHLORIDE 3 MILLILITER(S): 9 INJECTION INTRAMUSCULAR; INTRAVENOUS; SUBCUTANEOUS at 21:41

## 2017-07-11 RX ADMIN — HEPARIN SODIUM 5000 UNIT(S): 5000 INJECTION INTRAVENOUS; SUBCUTANEOUS at 05:10

## 2017-07-11 RX ADMIN — LOSARTAN POTASSIUM 25 MILLIGRAM(S): 100 TABLET, FILM COATED ORAL at 13:33

## 2017-07-11 RX ADMIN — HEPARIN SODIUM 5000 UNIT(S): 5000 INJECTION INTRAVENOUS; SUBCUTANEOUS at 13:39

## 2017-07-11 RX ADMIN — HEPARIN SODIUM 5000 UNIT(S): 5000 INJECTION INTRAVENOUS; SUBCUTANEOUS at 21:40

## 2017-07-11 RX ADMIN — Medication 250 MILLIGRAM(S): at 00:32

## 2017-07-11 NOTE — PROGRESS NOTE ADULT - ASSESSMENT
82 y o male initially admitted with eroded artificial urinary sphincter, s/p extraction.  Found to be in 3rd degree hear block with echo concerning for mitral valve endocarditis.  Per Patient reports "slow heart beat" for years.   No fever or leucocytosis,   Cultures negative to date.  Clinical suspicion for endocarditis low.  s/p CARL showing flail cordae rather than vegetation of mitral valve.    Recommend  -D/C vanco and ceftriaxone.  -ESR/CRP slightly elevated  -Follow up prelim cx, all blood cultures negative to date.

## 2017-07-11 NOTE — PROGRESS NOTE ADULT - SUBJECTIVE AND OBJECTIVE BOX
[employer]  service: Carmen  ID: [id number]    24 HOUR EVENTS/ROS: No acute events overnight. Denies headaches, F/C, dizziness, syncope, CP/SOB, N/V, abdominal pain, dysuria,  peripheral swelling, skin changes, new joint aches. Tolerating PO, ambulatory with assistance. Reports had a bowel movement after fleet enema yesterday.    MEDICATIONS:  aspirin enteric coated 81 milliGRAM(s) Oral daily  hydrochlorothiazide 12.5 milliGRAM(s) Oral daily  heparin  Injectable 5000 Unit(s) SubCutaneous every 8 hours  losartan 25 milliGRAM(s) Oral daily  vancomycin  IVPB 1000 milliGRAM(s) IV Intermittent every 12 hours  vancomycin  IVPB   IV Intermittent   cefTRIAXone   IVPB 2 Gram(s) IV Intermittent every 24 hours  docusate sodium 100 milliGRAM(s) Oral two times a day  senna 2 Tablet(s) Oral at bedtime  polyethylene glycol 3350 17 Gram(s) Oral daily  bisacodyl Suppository 10 milliGRAM(s) Rectal daily PRN  atorvastatin 10 milliGRAM(s) Oral at bedtime    PHYSICAL EXAM:  T(C): 36.1 (17 @ 06:55), Max: 36.5 (17 @ 04:56)  HR: 86 (17 @ 06:55) (55 - 86)  BP: 114/73 (17 @ 06:55) (109/60 - 146/61)  RR: 16 (17 @ 06:55) (16 - 18)  SpO2: 98% (17 @ 06:55) (97% - 99%)  Wt(kg): --  Daily     Daily Weight in k (2017 04:56)  I&O's Summary    10 Jul 2017 07:01  -  2017 07:00  --------------------------------------------------------  IN: 880 mL / OUT: 1900 mL / NET: -1020 mL      TELEMETRY: sinus 50s-80s, one episode bradycardia to 30s, 1st degree block    Appearance: elderly  male in NAD	  HEENT:   Normal oral mucosa, PERRL, EOMI	  Lymphatic: No lymphadenopathy  Cardiovascular: slow rate, no JVD, no murmurs appreciated  Respiratory: Lungs clear to auscultation	  Psychiatry: A & O x 3, Mood & affect appropriate  Gastrointestinal:  Soft, Non-tender, + BS  Genitourinary: catheter from ventral penis draining clear yellow fluid, no blood/clots around penile meatus  Skin: No rashes, No ecchymoses, No cyanosis	  Neurologic: Non-focal  MSK/Extremities: Normal range of motion, No clubbing, cyanosis or edema  Vascular: Peripheral pulses palpable 2+ bilaterally    LABS:	 	                        12.7   6.8   )-----------( 216      ( 10 Jul 2017 06:04 )             42.0     07-10    140  |  103  |  19  ----------------------------<  106<H>  3.5   |  25  |  0.97    Ca    9.3      10 Jul 2017 06:04  Phos  3.3     07-10  Mg     2.0     07-10    FS: CAPILLARY BLOOD GLUCOSE  105 (10 Jul 2017 21:13)  108 (10 Jul 2017 18:07)  123 (10 Jul 2017 12:57)    BCX/UCX:   Culture - Blood in AM (17 @ 08:24)    Specimen Source: .Blood Blood-Peripheral    Culture Results:   No growth to date.     	  RADIOLOGY:  7/10/17 CARL:  Conclusions:  1. Mitral valve prolapse involving the anterior mitral  leaflet. The A2 scallop of the anterior leaflet is  partially flail. A linear mobile echodensity consistent  with torn chordae is seen attached to the tip of the A2  scallop. Severe, eccentric, posteriorly-directed mitral  regurgitation.  2. Calcified trileaflet aortic valve with normal opening.  Moderate aortic regurgitation. Vena contractaabout 0.4-0.5  cm.  3. Normal aortic root and ascending aorta. Simple atheroma  noted in the aortic arch and descending aorta.  4. Left atrial enlargement. No left atrial or left atrial  appendage thrombus. Normal left atrial appendage function  (velocity>60 cm/s).  5. Normal left ventricular systolic function. No segmental  wall motion abnormalities.  6. Normal right ventricular size and function.  7. Agitated saline injection and color flow Doppler  demonstrate no evidence ofa patent foramen ovale.  No definite vegetations seen. Partially flail A2 scallop of  the mitral valve. If clinical suspicion for endocarditis  remains high, consider repeat CARL in approximately 7-10  days.

## 2017-07-11 NOTE — ANESTHESIA FOLLOW-UP NOTE - NSEVALATION_GEN_ALL_CORE
All questions were answered/No apparent complications or complaints reguarding anesthesia care at this time
No apparent complications or complaints reguarding anesthesia care at this time/All questions were answered

## 2017-07-11 NOTE — PROGRESS NOTE ADULT - SUBJECTIVE AND OBJECTIVE BOX
82y old  Male who presents with a chief complaint of eroded artificial urethral sphincter (10 Jul 2017 17:43)      Interval history:  Afebrile, no N/V/D, no abdominal pain, no cough, denies dysuria, had 2 BM overnight.    Antimicrobials:      REVIEW OF SYSTEMS:  As above.    Vital Signs Last 24 Hrs  T(C): 36.1 (07-11-17 @ 06:55), Max: 36.5 (07-11-17 @ 04:56)  T(F): 97 (07-11-17 @ 06:55), Max: 97.7 (07-11-17 @ 04:56)  HR: 86 (07-11-17 @ 06:55) (58 - 86)  BP: 114/73 (07-11-17 @ 06:55) (109/60 - 146/61)  RR: 16 (07-11-17 @ 06:55) (16 - 18)  SpO2: 98% (07-11-17 @ 06:55) (97% - 98%)    PHYSICAL EXAM:  Awake communicative, NAD.   Cardiovascular: S1S2 normal. + murmur  Lungs: Good air entry B/L lung fields.  Gastrointestinal: soft, nontender, nondistended. some ecchymosis on rt groin area with steristrips.  Extremities: no edema.  +monte                           12.7   6.8   )-----------( 216      ( 10 Jul 2017 06:04 )             42.0   07-10    140  |  103  |  19  ----------------------------<  106<H>  3.5   |  25  |  0.97    Ca    9.3      10 Jul 2017 06:04  Phos  3.3     07-10  Mg     2.0     07-10      RECENT CULTURES:  All cultures NTD      CARL:  < from: Transesophageal Echocardiogram w/o TTE (07.10.17 @ 09:20) >    Conclusions:    1. Mitral valve prolapse involving the anterior mitral  leaflet. The A2 scallop of the anterior leaflet is  partially flail. A linear mobile echodensity consistent  with torn chordae is seen attached to the tip of the A2  scallop. Severe, eccentric, posteriorly-directed mitral  regurgitation.  2. Calcified trileaflet aortic valve with normal opening.  Moderate aortic regurgitation. Vena contractaabout 0.4-0.5  cm.  3. Normal aortic root and ascending aorta. Simple atheroma  noted in the aortic arch and descending aorta.  4. Left atrial enlargement. No left atrial or left atrial  appendage thrombus. Normal left atrial appendage function  (velocity>60 cm/s).  5. Normal left ventricular systolic function. No segmental  wall motion abnormalities.  6. Normal right ventricular size and function.  7. Agitated saline injection and color flow Doppler  demonstrate no evidence ofa patent foramen ovale.  No definite vegetations seen. Partially flail A2 scallop of  the mitral valve. If clinical suspicion for endocarditis  remains high, consider repeat CARL in approximately 7-10  days.

## 2017-07-11 NOTE — PROGRESS NOTE ADULT - SUBJECTIVE AND OBJECTIVE BOX
82 y.o. Gentleman - h.o. T2DM, Prostate CA, no known CV disease (including conduction disease) - p/w urinary incontinence with elective procedure for revision of AUS - and cardiology consulted for sinus bradycardia with 1' AVB (without prior EKGs); ultimately discovered to have asymptomatic severe MR (Flail A2 Scallop, torn chordae, moderate AI), no PHTN, normal LV cavity dimensions and then found to have CHB with stable junctional rhythm. No evidence of vegetations  ========================================== Overnight Events - Appreciate EP recs - Stable junctional escape; no CP/SOB/Palps/AMS - BP Stable  - CARL results noted below ==========================================	        PAST MEDICAL & SURGICAL HISTORY:  Prostate CA  Diabetes mellitus  Urinary incontinence  H/O prostatectomy      PREVIOUS DIAGNOSTIC TESTING:: unknown    HOME MEDICATIONS :     MEDICATIONS  (STANDING):  MEDICATIONS  (STANDING):  aspirin enteric coated 81 milliGRAM(s) Oral daily  atorvastatin 10 milliGRAM(s) Oral at bedtime  hydrochlorothiazide 12.5 milliGRAM(s) Oral daily  docusate sodium 100 milliGRAM(s) Oral two times a day  sodium chloride 0.9% lock flush 3 milliLiter(s) IV Push every 8 hours  heparin  Injectable 5000 Unit(s) SubCutaneous every 8 hours  losartan 25 milliGRAM(s) Oral daily  vancomycin  IVPB 1000 milliGRAM(s) IV Intermittent every 12 hours  vancomycin  IVPB   IV Intermittent   senna 2 Tablet(s) Oral at bedtime  polyethylene glycol 3350 17 Gram(s) Oral daily  cefTRIAXone   IVPB 2 Gram(s) IV Intermittent every 24 hours        MEDICATIONS  (PRN):      FAMILY HISTORY:  No pertinent family history in first degree relatives    Allergies    No Known Allergies    Intolerances      PHYSICAL EXAM    Vital Signs Last 24 Hrs  T(C): 36.1 (17 @ 06:55), Max: 36.5 (17 @ 04:56)  HR: 86 (17 @ 06:55) (55 - 86)  BP: 114/73 (07-11-17 @ 06:55) (109/60 - 146/61)  RR: 16 (17 @ 06:55) (16 - 18)  SpO2: 98% (17 @ 06:55) (97% - 99%)  Wt(kg): --  Daily     Daily Weight in k (2017 04:56)  I&O's Summary    10 Jul 2017 07:01  -  2017 07:00  --------------------------------------------------------  IN: 880 mL / OUT: 1900 mL / NET: -1020 mL  PHYSICAL EXAM:      Constitutional: NAD, alert    Eyes: No visual abnormalities    ENMT: No JVD    Respiratory: CTAB    Cardiovascular: Bradycardic; S1, S2, III HSM radiating to the back     Gastrointestinal: Soft NT ND     Genitourinary: Bladder device in     Extremities:no LE edema, no skin prupura    Vascular: Good distal pulses    Neurological: AOX3 no focal deficits    Skin: no bruising, no petechiae                                      13.9   7.3   )-----------( 188      ( 2017 07:06 )             40.1     07-04    140  |  103  |  20  ----------------------------<  108<H>  3.7   |  27  |  1.06    Ca    9.0      2017 07:06    PT/INR - ( 2017 19:29 )   PT: 11.7 sec;   INR: 1.07 ratio         PTT - ( 2017 19:29 )  PTT:35.2 sec      < from: Transesophageal Echocardiogram w/o TTE (07.10.17 @ 09:20) >  Observations:  Mitral Valve: Mitral valve prolapse involving the anterior  mitral leaflet. The A2 scallop of the anterior leaflet is  partially flail. A linear mobile echodensity consistent  with torn chordae is seen attached to the tip of the A2  scallop. Severe, eccentric, posteriorly-directed mitral  regurgitation.  Aortic Valve/Aorta: Calcified trileaflet aortic valve with  normal opening. Moderate aortic regurgitation. Vena  contractaabout 0.4-0.5 cm.  Normal aortic root and ascending aorta. Simple atheroma  noted in the aortic arch and descending aorta.  Left Atrium: Left atrial enlargement. No left atrial or  left atrial appendage thrombus. Normal left atrial  appendage function (velocity>60 cm/s).  Left Ventricle: Normal left ventricular systolic function.  No segmental wall motion abnormalities.  Right Heart: Normal right atrium. Normal right ventricular  size and function. Normal tricuspid valve. Mild tricuspid  regurgitation. Normal pulmonic valve. Minimal pulmonic  regurgitation.  Pericardium/Pleura: Normal pericardium with no pericardial  effusion.  Hemodynamic: Estimated right atrial pressure is 8 mm Hg.  Estimated right ventricular systolic pressure equals 38 mm  Hg, assuming right atrial pressure equals 8 mm Hg,  consistent with borderline pulmonary hypertension. Agitated  saline injection and color flow Doppler demonstrate no  evidence of a patent foramen ovale.  ------------------------------------------------------------------------  Conclusions:  1. Mitral valve prolapse involving the anterior mitral  leaflet. The A2 scallop of the anterior leaflet is  partially flail. A linear mobile echodensity consistent  with torn chordae is seen attached to the tip of the A2  scallop. Severe, eccentric, posteriorly-directed mitral  regurgitation.  2. Calcified trileaflet aortic valve with normal opening.  Moderate aortic regurgitation. Vena contractaabout 0.4-0.5  cm.  3. Normal aortic root and ascending aorta. Simple atheroma  noted in the aortic arch and descending aorta.  4. Left atrial enlargement. No left atrial or left atrial  appendage thrombus. Normal left atrial appendage function  (velocity>60 cm/s).  5. Normal left ventricular systolic function. No segmental  wall motion abnormalities.  6. Normal right ventricular size and function.  7. Agitated saline injection and color flow Doppler  demonstrate no evidence ofa patent foramen ovale.  No definite vegetations seen. Partially flail A2 scallop of  the mitral valve. If clinical suspicion for endocarditis  remains high, consider repeat CARL in approximately 7-10  days.  ------------------------------------------------------------------------  Confirmed on  7/10/2017 - 12:21:31 by Carlitos Marie M.D.    < end of copied text >      < from: Transthoracic Echocardiogram (17 @ 21:18) >  Observations:  Mitral Valve: Bileaflet mitral valve prolapse (anterior >  posterior).There is focal thickening on the atrial side of  the anterior mitral valve. This is suspicous for vegetation  in the appropriate clinical setting.  At least moderate,  eccentric mitral regurgitation (may be underestimated)  Aortic Valve/Aorta: Calcified trileaflet aortic valve with  normal opening. Mild-moderate aortic regurgitation.  Upper limits of normal aortic root size for BSA.  (Ao: 3.8  cm at the sinuses of Valsalva).  Left Atrium: Normal left atrium.  LA volume index = 21  cc/m2.  Left Ventricle: Normal left ventricular systolic function.  No segmental wall motion abnormalities. Normal left  ventricular internal dimensions and wall thicknesses. Basal  septalhypertrophy present. Reversal of the E-A waves of  the mitral inflow pattern consistent with reduced  compliance of the left ventricle.  Right Heart: Normal right atrium. Normal right ventricular  size and function. Normal tricuspid valve. Mild tricuspid  regurgitation. Normal pulmonic valve. Mild pulmonic  regurgitation.  Pericardium/Pleura: No pericardial effusion seen.  Hemodynamic: Estimated right atrial pressure is 8 mm Hg.  Estimated right ventricular systolic pressure equals 31 mm  Hg, assuming right atrial pressure equals 8 mm Hg,  consistent with normal pulmonary pressures.  ------------------------------------------------------------------------  Conclusions:  1. 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 This is suspicous for vegetation  in the appropriate clinical setting.  At least moderate,  eccentric mitral regurgitation (may be underestimated)  2. Calcified trileaflet aortic valve withnormal opening.  Mild-moderate aortic regurgitation.  3. Normal left ventricular systolic function. No segmental  wall motion abnormalities.  4. Normal right ventricular size and function.  *** No previous Echo exam. Dr. Richter notified of  findings at 1130 AM on 17. CARL could better evaluate  the mitral and aortic valves if clinically indicated.  ------------------------------------------------------------------------  Confirmed on  2017 - 11:34:46 by Krunal Allen M.D.  ---------------------------------------------------------------------    < end of copied text >

## 2017-07-11 NOTE — PROGRESS NOTE ADULT - ASSESSMENT
82 y.o. Gentleman - h.o. T2DM, Prostate CA, no known CV disease (including conduction disease) - p/w urinary incontinence with elective procedure for revision of AUS - and cardiology consulted for sinus bradycardia with 1' AVB (without prior EKGs) to compare as well as a subsequent finding of thickening on the mitral valve with eccentric regurgitation, suggestive of possible vegetation. Exam without signs of endocarditis, but with a HSM murmurat apex.  1. Sinus Bradycardia with episodes of CHB with stable junctional escape 2. Thickening of MV (possible vegetation)  3. AUS Revision   Recommendations: - Although receiving antibiotics and no blood cultures, patient has no sxs/signs of endocarditis with the exception of the echo finding, which does not meet Modified Duke's Criteria - Stable junctional rhythm; may obtain CARL  - Tele; monitor closely - If AMS/low BP/CP, low threshold to move to uni - ID consult is reasonable post-operatively - Once cleared from infectious standpoint, per EP for PPM 82 y.o. Gentleman - h.o. T2DM, Prostate CA, no known CV disease (including conduction disease) - p/w urinary incontinence with elective procedure for revision of AUS - and cardiology consulted for sinus bradycardia with 1' AVB (without prior EKGs); ultimately discovered to have asymptomatic severe MR (Flail A2 Scallop, torn chordae, moderate AI), no PHTN, normal LV cavity dimensions and then subsequently found to have CHB with stable junctional rhythm. No evidence of vegetations .  1. Sinus Bradycardia with episodes of CHB with stable junctional escape 2. Flail A2 Scallop with Posteriorly directed MR 3. AUS Revision   Recommendations: - Very low suspicion for endocarditis; negative blood cultures - May be chronic asymptomatic severe MR and unclear if meets definite indications for surgery given LV cavity dimensions - Will have Dr. Jones of [facility] come by to evaluate - Tele; monitor closely - PPM on this admission after decision regarding surgery  - If AMS/low BP/CP, low threshold to move to unit

## 2017-07-11 NOTE — PROGRESS NOTE ADULT - SUBJECTIVE AND OBJECTIVE BOX
Date of Admission: 7/3/2017    24H hour events: No acute events overnight. Currently refusing PPM placement.     MEDICATIONS:  aspirin enteric coated 81 milliGRAM(s) Oral daily  hydrochlorothiazide 12.5 milliGRAM(s) Oral daily  heparin  Injectable 5000 Unit(s) SubCutaneous every 8 hours  losartan 25 milliGRAM(s) Oral daily          docusate sodium 100 milliGRAM(s) Oral two times a day  senna 2 Tablet(s) Oral at bedtime  polyethylene glycol 3350 17 Gram(s) Oral daily  bisacodyl Suppository 10 milliGRAM(s) Rectal daily PRN    atorvastatin 10 milliGRAM(s) Oral at bedtime  insulin lispro (HumaLOG) corrective regimen sliding scale   SubCutaneous three times a day before meals  insulin lispro (HumaLOG) corrective regimen sliding scale   SubCutaneous at bedtime  dextrose Gel 1 Dose(s) Oral once PRN  dextrose 50% Injectable 12.5 Gram(s) IV Push once  dextrose 50% Injectable 25 Gram(s) IV Push once  dextrose 50% Injectable 25 Gram(s) IV Push once  glucagon  Injectable 1 milliGRAM(s) IntraMuscular once PRN    dextrose 5%. 1000 milliLiter(s) IV Continuous <Continuous>      REVIEW OF SYSTEMS:  General: no fatigue/malaise, weight loss/gain.  Skin: no rashes.  Ophthalmologic: no blurred vision, no loss of vision. 	  ENT: no sore throat, rhinorrhea, sinus congestion.  Respiratory: no SOB, cough or wheeze.  Gastrointestinal:  no N/V/D, no melena/hematemesis/hematochezia.  Genitourinary: no dysuria/hesitancy or hematuria.  Musculoskeletal: no myalgias or arthralgias.  Neurological: no changes in vision or hearing, no lightheadedness/dizziness, no syncope/near syncope	  Psychiatric: no unusual stress/anxiety.   Hematology/Lymphatics: no unusual bleeding, bruising and no lymphadenopathy.  Endocrine: no unusual thirst.   All others negative except as stated above and in HPI.    PHYSICAL EXAM:  T(C): 36.7 (07-11-17 @ 14:52), Max: 36.7 (07-11-17 @ 14:52)  HR: 85 (07-11-17 @ 14:52) (58 - 86)  BP: 109/78 (07-11-17 @ 14:52) (109/60 - 146/61)  RR: 18 (07-11-17 @ 14:52) (16 - 18)  SpO2: 96% (07-11-17 @ 14:52) (96% - 98%)  Wt(kg): --  I&O's Summary    10 Jul 2017 07:01  -  11 Jul 2017 07:00  --------------------------------------------------------  IN: 880 mL / OUT: 1900 mL / NET: -1020 mL    11 Jul 2017 07:01  -  11 Jul 2017 20:15  --------------------------------------------------------  IN: 520 mL / OUT: 800 mL / NET: -280 mL        Appearance: Normal	  HEENT:   Normal oral mucosa, PERRL, EOMI	  Lymphatic: No lymphadenopathy  Cardiovascular: Normal S1 S2, No JVD, No murmurs, No edema  Respiratory: Lungs clear to auscultation	  Psychiatry: A & O x 3, Mood & affect appropriate  Gastrointestinal:  Soft, Non-tender, + BS	  Skin: No rashes, No ecchymoses, No cyanosis	  Neurologic: Non-focal  Extremities: Normal range of motion, No clubbing, cyanosis or edema  Vascular: Peripheral pulses palpable 2+ bilaterally        LABS:	 	    CBC Full  -  ( 10 Jul 2017 06:04 )  WBC Count : 6.8 K/uL  Hemoglobin : 12.7 g/dL  Hematocrit : 42.0 %  Platelet Count - Automated : 216 K/uL  Mean Cell Volume : 91.1 fl  Mean Cell Hemoglobin : 27.6 pg  Mean Cell Hemoglobin Concentration : 30.3 gm/dL  Auto Neutrophil # : x  Auto Lymphocyte # : x  Auto Monocyte # : x  Auto Eosinophil # : x  Auto Basophil # : x  Auto Neutrophil % : x  Auto Lymphocyte % : x  Auto Monocyte % : x  Auto Eosinophil % : x  Auto Basophil % : x    07-10    140  |  103  |  19  ----------------------------<  106<H>  3.5   |  25  |  0.97    Ca    9.3      10 Jul 2017 06:04  Phos  3.3     07-10  Mg     2.0     07-10        proBNP:   Lipid Profile:   HgA1c:   TSH:       CARDIAC MARKERS:            TELEMETRY: 	    ECG:  	  RADIOLOGY:  OTHER: 	    PREVIOUS DIAGNOSTIC TESTING:    [ ] Echocardiogram:  [ ]  Catheterization:  [ ] Stress Test:  	  	  ASSESSMENT/PLAN: 	    Dyana Blackburn MD [address] [place] [address] cardiology Date of Admission: 7/3/2017    24H hour events: No acute events overnight. Currently refusing PPM placement.     MEDICATIONS:  aspirin enteric coated 81 milliGRAM(s) Oral daily  hydrochlorothiazide 12.5 milliGRAM(s) Oral daily  heparin  Injectable 5000 Unit(s) SubCutaneous every 8 hours  losartan 25 milliGRAM(s) Oral daily          docusate sodium 100 milliGRAM(s) Oral two times a day  senna 2 Tablet(s) Oral at bedtime  polyethylene glycol 3350 17 Gram(s) Oral daily  bisacodyl Suppository 10 milliGRAM(s) Rectal daily PRN    atorvastatin 10 milliGRAM(s) Oral at bedtime  insulin lispro (HumaLOG) corrective regimen sliding scale   SubCutaneous three times a day before meals  insulin lispro (HumaLOG) corrective regimen sliding scale   SubCutaneous at bedtime  dextrose Gel 1 Dose(s) Oral once PRN  dextrose 50% Injectable 12.5 Gram(s) IV Push once  dextrose 50% Injectable 25 Gram(s) IV Push once  dextrose 50% Injectable 25 Gram(s) IV Push once  glucagon  Injectable 1 milliGRAM(s) IntraMuscular once PRN    dextrose 5%. 1000 milliLiter(s) IV Continuous <Continuous>      REVIEW OF SYSTEMS:  General: no fatigue/malaise, weight loss/gain.  Skin: no rashes.  Ophthalmologic: no blurred vision, no loss of vision. 	  ENT: no sore throat, rhinorrhea, sinus congestion.  Respiratory: no SOB, cough or wheeze.  Gastrointestinal:  no N/V/D, no melena/hematemesis/hematochezia.  Genitourinary: no dysuria/hesitancy or hematuria.  Musculoskeletal: no myalgias or arthralgias.  Neurological: no changes in vision or hearing, no lightheadedness/dizziness, no syncope/near syncope	  Psychiatric: no unusual stress/anxiety.   Hematology/Lymphatics: no unusual bleeding, bruising and no lymphadenopathy.  Endocrine: no unusual thirst.   All others negative except as stated above and in HPI.    PHYSICAL EXAM:  T(C): 36.7 (07-11-17 @ 14:52), Max: 36.7 (07-11-17 @ 14:52)  HR: 85 (07-11-17 @ 14:52) (58 - 86)  BP: 109/78 (07-11-17 @ 14:52) (109/60 - 146/61)  RR: 18 (07-11-17 @ 14:52) (16 - 18)  SpO2: 96% (07-11-17 @ 14:52) (96% - 98%)  Wt(kg): --  I&O's Summary    10 Jul 2017 07:01  -  11 Jul 2017 07:00  --------------------------------------------------------  IN: 880 mL / OUT: 1900 mL / NET: -1020 mL    11 Jul 2017 07:01  -  11 Jul 2017 20:15  --------------------------------------------------------  IN: 520 mL / OUT: 800 mL / NET: -280 mL        Appearance: Normal	  HEENT:   Normal oral mucosa, PERRL, EOMI	  Lymphatic: No lymphadenopathy  Cardiovascular: Normal S1 S2, No JVD, No murmurs, No edema  Respiratory: Lungs clear to auscultation	  Psychiatry: A & O x 3, Mood & affect appropriate  Gastrointestinal:  Soft, Non-tender, + BS	  Skin: No rashes, No ecchymoses, No cyanosis	  Neurologic: Non-focal  Extremities: Normal range of motion, No clubbing, cyanosis or edema  Vascular: Peripheral pulses palpable 2+ bilaterally        LABS:	 	    CBC Full  -  ( 10 Jul 2017 06:04 )  WBC Count : 6.8 K/uL  Hemoglobin : 12.7 g/dL  Hematocrit : 42.0 %  Platelet Count - Automated : 216 K/uL  Mean Cell Volume : 91.1 fl  Mean Cell Hemoglobin : 27.6 pg  Mean Cell Hemoglobin Concentration : 30.3 gm/dL  Auto Neutrophil # : x  Auto Lymphocyte # : x  Auto Monocyte # : x  Auto Eosinophil # : x  Auto Basophil # : x  Auto Neutrophil % : x  Auto Lymphocyte % : x  Auto Monocyte % : x  Auto Eosinophil % : x  Auto Basophil % : x    07-10    140  |  103  |  19  ----------------------------<  106<H>  3.5   |  25  |  0.97    Ca    9.3      10 Jul 2017 06:04  Phos  3.3     07-10  Mg     2.0     07-10      ASSESSMENT/PLAN: 	    83 y/o man with h/o with DM, Prostate Cancer s/p Prostatectomy, Ureteral Artificial Sphincter placement for incontinence found to have intermittent CHB on tele    1) Intermittent CHB - no longer thought to have IE, and could be candidate for PPM placement, but currently refusing PPM placement, does not feel as though it is necessary, will plan to return tomorrow morning to discuss further with interpretor service

## 2017-07-12 DIAGNOSIS — I34.0 NONRHEUMATIC MITRAL (VALVE) INSUFFICIENCY: ICD-10-CM

## 2017-07-12 DIAGNOSIS — Z02.9 ENCOUNTER FOR ADMINISTRATIVE EXAMINATIONS, UNSPECIFIED: ICD-10-CM

## 2017-07-12 LAB
APTT BLD: 52 SEC — HIGH (ref 27.5–37.4)
INR BLD: 1.11 RATIO — SIGNIFICANT CHANGE UP (ref 0.88–1.16)
PROTHROM AB SERPL-ACNC: 12.1 SEC — SIGNIFICANT CHANGE UP (ref 9.8–12.7)

## 2017-07-12 PROCEDURE — 99233 SBSQ HOSP IP/OBS HIGH 50: CPT

## 2017-07-12 PROCEDURE — 99233 SBSQ HOSP IP/OBS HIGH 50: CPT | Mod: GC

## 2017-07-12 RX ADMIN — POLYETHYLENE GLYCOL 3350 17 GRAM(S): 17 POWDER, FOR SOLUTION ORAL at 13:17

## 2017-07-12 RX ADMIN — LOSARTAN POTASSIUM 25 MILLIGRAM(S): 100 TABLET, FILM COATED ORAL at 05:39

## 2017-07-12 RX ADMIN — ATORVASTATIN CALCIUM 10 MILLIGRAM(S): 80 TABLET, FILM COATED ORAL at 21:11

## 2017-07-12 RX ADMIN — Medication 12.5 MILLIGRAM(S): at 09:07

## 2017-07-12 RX ADMIN — HEPARIN SODIUM 5000 UNIT(S): 5000 INJECTION INTRAVENOUS; SUBCUTANEOUS at 13:18

## 2017-07-12 RX ADMIN — Medication 100 MILLIGRAM(S): at 05:39

## 2017-07-12 RX ADMIN — Medication 81 MILLIGRAM(S): at 13:17

## 2017-07-12 RX ADMIN — SODIUM CHLORIDE 3 MILLILITER(S): 9 INJECTION INTRAMUSCULAR; INTRAVENOUS; SUBCUTANEOUS at 13:23

## 2017-07-12 RX ADMIN — HEPARIN SODIUM 5000 UNIT(S): 5000 INJECTION INTRAVENOUS; SUBCUTANEOUS at 05:38

## 2017-07-12 RX ADMIN — Medication 100 MILLIGRAM(S): at 18:15

## 2017-07-12 RX ADMIN — SODIUM CHLORIDE 3 MILLILITER(S): 9 INJECTION INTRAMUSCULAR; INTRAVENOUS; SUBCUTANEOUS at 21:12

## 2017-07-12 RX ADMIN — SENNA PLUS 2 TABLET(S): 8.6 TABLET ORAL at 21:11

## 2017-07-12 RX ADMIN — SODIUM CHLORIDE 3 MILLILITER(S): 9 INJECTION INTRAMUSCULAR; INTRAVENOUS; SUBCUTANEOUS at 05:41

## 2017-07-12 RX ADMIN — HEPARIN SODIUM 5000 UNIT(S): 5000 INJECTION INTRAVENOUS; SUBCUTANEOUS at 21:11

## 2017-07-12 NOTE — CONSULT NOTE ADULT - PROBLEM SELECTOR RECOMMENDATION 9
Extensive discussion with daughter Lorenza in presence of patient. She states the family discussed w/ the pt. last evening regarding options of both open mitral surgery, mitraclip, and PPM (pt. noted to have complete heart block on tele with rate to 30). The patient does not wish to proceed with aggressive or invasive intervention at this time. We discussed risks/benefit, up to and including death, and likelihood of readmission without intervention. Refusing both mitraclip and PPM, would like to continue medical management.  Discussed w/ Dr. Jones and primary care team.

## 2017-07-12 NOTE — PHYSICAL THERAPY INITIAL EVALUATION ADULT - DISCHARGE DISPOSITION, PT EVAL
TBD once functional eval can be completed , family wish to have pt return to [facility] , pt and family decide if want to move forward for OR for PPM Subacute rehab (with possible transition to long term care facility per NANI Rojas)/rehabilitation facility

## 2017-07-12 NOTE — PROGRESS NOTE ADULT - PROBLEM SELECTOR PLAN 1
-monitor on telemetry  -pt currently refusing surgery, will speak with him with  later today to discuss risks and benefits   -if pt still does not want surgery, will plan DC to [facility]

## 2017-07-12 NOTE — PROVIDER CONTACT NOTE (OTHER) - BACKGROUND
Pt s/p AUS removal.
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
Pt adm s/p removal of artifical urinary sphincter.
Pt s/p removal of AUS.
admitted with eroded AUS
hx: episodes of 3rd degree heart block
pt admitted for mechanical breakdown of urinary sphincter implant.
admitted with eroded AUS

## 2017-07-12 NOTE — CONSULT NOTE ADULT - CONSULT REASON
Rule out endocarditis
3rd degree heart block
Mitral Regurgitation
Sinus javid/ pre-op clearance
bradycardia
Heart block/Vegetation

## 2017-07-12 NOTE — PROGRESS NOTE ADULT - SUBJECTIVE AND OBJECTIVE BOX
24 HOUR EVENTS/ROS:    MEDICATIONS:  aspirin enteric coated 81 milliGRAM(s) Oral daily  hydrochlorothiazide 12.5 milliGRAM(s) Oral daily  heparin  Injectable 5000 Unit(s) SubCutaneous every 8 hours  losartan 25 milliGRAM(s) Oral daily  docusate sodium 100 milliGRAM(s) Oral two times a day  senna 2 Tablet(s) Oral at bedtime  polyethylene glycol 3350 17 Gram(s) Oral daily  bisacodyl Suppository 10 milliGRAM(s) Rectal daily PRN  atorvastatin 10 milliGRAM(s) Oral at bedtime  insulin lispro (HumaLOG) corrective regimen sliding scale   SubCutaneous three times a day before meals  insulin lispro (HumaLOG) corrective regimen sliding scale   SubCutaneous at bedtime  dextrose Gel 1 Dose(s) Oral once PRN  dextrose 50% Injectable 12.5 Gram(s) IV Push once  dextrose 50% Injectable 25 Gram(s) IV Push once  dextrose 50% Injectable 25 Gram(s) IV Push once  glucagon  Injectable 1 milliGRAM(s) IntraMuscular once PRN  dextrose 5%. 1000 milliLiter(s) IV Continuous <Continuous>    PHYSICAL EXAM:  T(C): 36.9 (17 @ 04:12), Max: 37.1 (17 @ 20:45)  HR: 52 (17 @ 05:34) (52 - 85)  BP: 117/61 (17 @ 05:34) (94/50 - 123/62)  RR: 18 (17 @ 04:12) (18 - 18)  SpO2: 97% (17 @ 04:12) (96% - 98%)  Daily Weight in k (2017 04:12)  I&O's Summary    2017 07:01  -  2017 07:00  --------------------------------------------------------  IN: 660 mL / OUT: 800 mL / NET: -140 mL      TELEMETRY: Mobitz type 2 block 1 episode to 30s overnight    Appearance: elderly  male in NAD	  HEENT:   Normal oral mucosa, PERRL, EOMI	  Lymphatic: No lymphadenopathy  Cardiovascular: slow rate, no JVD, no murmurs appreciated  Respiratory: Lungs clear to auscultation	  Psychiatry: A & O x 3, Mood & affect appropriate  Gastrointestinal:  Soft, Non-tender, + BS  Genitourinary: catheter from ventral penis draining clear yellow fluid, no blood/clots around penile meatus  Skin: No rashes, No ecchymoses, No cyanosis	  Neurologic: Non-focal  MSK/Extremities: Normal range of motion, No clubbing, cyanosis or edema  Vascular: Peripheral pulses palpable 2+ bilaterally    FS: CAPILLARY BLOOD GLUCOSE  113 (2017 08:55)  154 (2017 21:29)  100 (2017 18:04)  147 (2017 12:45) 24 HOUR EVENTS/ROS: No acute overnight events. Denies headaches, F/C, dizziness, syncope, CP/SOB, N/V, abdominal pain, dysuria, changes in BM, peripheral swelling, skin changes, new joint aches. Tolerating PO, ambulatory.     MEDICATIONS:  aspirin enteric coated 81 milliGRAM(s) Oral daily  hydrochlorothiazide 12.5 milliGRAM(s) Oral daily  heparin  Injectable 5000 Unit(s) SubCutaneous every 8 hours  losartan 25 milliGRAM(s) Oral daily  docusate sodium 100 milliGRAM(s) Oral two times a day  senna 2 Tablet(s) Oral at bedtime  polyethylene glycol 3350 17 Gram(s) Oral daily  bisacodyl Suppository 10 milliGRAM(s) Rectal daily PRN  atorvastatin 10 milliGRAM(s) Oral at bedtime  insulin lispro (HumaLOG) corrective regimen sliding scale   SubCutaneous three times a day before meals  insulin lispro (HumaLOG) corrective regimen sliding scale   SubCutaneous at bedtime  dextrose Gel 1 Dose(s) Oral once PRN  dextrose 50% Injectable 12.5 Gram(s) IV Push once  dextrose 50% Injectable 25 Gram(s) IV Push once  dextrose 50% Injectable 25 Gram(s) IV Push once  glucagon  Injectable 1 milliGRAM(s) IntraMuscular once PRN  dextrose 5%. 1000 milliLiter(s) IV Continuous <Continuous>    PHYSICAL EXAM:  T(C): 36.9 (17 @ 04:12), Max: 37.1 (17 @ 20:45)  HR: 52 (17 @ 05:34) (52 - 85)  BP: 117/61 (17 @ 05:34) (94/50 - 123/62)  RR: 18 (17 @ 04:12) (18 - 18)  SpO2: 97% (17 @ 04:12) (96% - 98%)  Daily Weight in k (2017 04:12)  I&O's Summary    2017 07:01  -  2017 07:00  --------------------------------------------------------  IN: 660 mL / OUT: 800 mL / NET: -140 mL      TELEMETRY: Mobitz type 2 block 1 episode to 30s overnight    Appearance: elderly  male in NAD	  HEENT:   Normal oral mucosa, PERRL, EOMI	  Lymphatic: No lymphadenopathy  Cardiovascular: slow rate, no JVD, no murmurs appreciated  Respiratory: Lungs clear to auscultation	  Psychiatry: A & O x 3, Mood & affect appropriate  Gastrointestinal:  Soft, Non-tender, + BS  Genitourinary: catheter from ventral penis draining clear yellow fluid, no blood/clots around penile meatus  Skin: No rashes, No ecchymoses, No cyanosis	  Neurologic: Non-focal  MSK/Extremities: Normal range of motion, No clubbing, cyanosis or edema  Vascular: Peripheral pulses palpable 2+ bilaterally    FS: CAPILLARY BLOOD GLUCOSE  113 (2017 08:55)  154 (2017 21:29)  100 (2017 18:04)  147 (2017 12:45) 24 HOUR EVENTS/ROS: No acute overnight events. Denies headaches, F/C, dizziness, syncope, CP/SOB, N/V, abdominal pain, dysuria, changes in BM, peripheral swelling, skin changes, new joint aches. Tolerating PO, ambulatory.     MEDICATIONS:  aspirin enteric coated 81 milliGRAM(s) Oral daily  hydrochlorothiazide 12.5 milliGRAM(s) Oral daily  heparin  Injectable 5000 Unit(s) SubCutaneous every 8 hours  losartan 25 milliGRAM(s) Oral daily  docusate sodium 100 milliGRAM(s) Oral two times a day  senna 2 Tablet(s) Oral at bedtime  polyethylene glycol 3350 17 Gram(s) Oral daily  bisacodyl Suppository 10 milliGRAM(s) Rectal daily PRN  atorvastatin 10 milliGRAM(s) Oral at bedtime  insulin lispro (HumaLOG) corrective regimen sliding scale   SubCutaneous three times a day before meals  insulin lispro (HumaLOG) corrective regimen sliding scale   SubCutaneous at bedtime  dextrose Gel 1 Dose(s) Oral once PRN  dextrose 50% Injectable 12.5 Gram(s) IV Push once  dextrose 50% Injectable 25 Gram(s) IV Push once  dextrose 50% Injectable 25 Gram(s) IV Push once  glucagon  Injectable 1 milliGRAM(s) IntraMuscular once PRN  dextrose 5%. 1000 milliLiter(s) IV Continuous <Continuous>    PHYSICAL EXAM:  T(C): 36.9 (17 @ 04:12), Max: 37.1 (17 @ 20:45)  HR: 52 (17 @ 05:34) (52 - 85)  BP: 117/61 (17 @ 05:34) (94/50 - 123/62)  RR: 18 (17 @ 04:12) (18 - 18)  SpO2: 97% (17 @ 04:12) (96% - 98%)  Daily Weight in k (2017 04:12)  I&O's Summary    2017 07:01  -  2017 07:00  --------------------------------------------------------  IN: 660 mL / OUT: 800 mL / NET: -140 mL      TELEMETRY: Mobitz type 2 block 1 episode to 30s overnight    Appearance: elderly  male in NAD	  HEENT:   Normal oral mucosa, PERRL, EOMI	  Lymphatic: No lymphadenopathy  Cardiovascular: slow rate, no JVD, no murmurs appreciated  Respiratory: Lungs clear to auscultation	  Psychiatry: A & O x 3, Mood & affect appropriate  Gastrointestinal:  Soft, Non-tender, + BS  Genitourinary: catheter from ventral penis draining clear yellow fluid, no blood/clots around penile meatus  Skin: No rashes, No ecchymoses, No cyanosis	  Neurologic: Non-focal  MSK/Extremities: Normal range of motion, No clubbing, cyanosis or edema  Vascular: Peripheral pulses palpable 2+ bilaterally    FS: CAPILLARY BLOOD GLUCOSE  113 (2017 08:55)  154 (2017 21:29)  100 (2017 18:04)  147 (2017 12:45)        consultant notes reviewed: EP, cardiology, CT surgery

## 2017-07-12 NOTE — PROGRESS NOTE ADULT - ASSESSMENT
82 y.o. Gentleman - h.o. T2DM, Prostate CA, no known CV disease (including conduction disease) - p/w urinary incontinence with elective procedure for revision of AUS - and cardiology consulted for sinus bradycardia with 1' AVB (without prior EKGs); ultimately discovered to have asymptomatic severe MR (Flail A2 Scallop, torn chordae, moderate AI), no PHTN, normal LV cavity dimensions and then subsequently found to have CHB with stable junctional rhythm. No evidence of vegetations .  1. Sinus Bradycardia with episodes of CHB with stable junctional escape 2. Flail A2 Scallop with Posteriorly directed MR 3. AUS Revision   Recommendations: - May be chronic asymptomatic severe MR and unclear if meets definite indications for surgery given LV cavity dimensions; at this point, not willing to pursue further cardiac interventions - Appreciate Dr. Jones & CTS Recs - Tele; monitor closely; although meets definite indications for PPM, currently deferring decision - If AMS/low BP/CP, low threshold to move to unit

## 2017-07-12 NOTE — PROVIDER CONTACT NOTE (OTHER) - ACTION/TREATMENT ORDERED:
PA aware, EKG to be completed. Will come to assess pt. Will continue to monitor pt and maintain pt safety.
Team 3, MD Cortes notified. Will continue to monitor pt.
MD to endorse to day team
Md made aware no interventions at this time
PA aware, no further interventions at this time. Will continue to monitor pt and maintain pt safety.
PA aware. Notify for HR less than 45. Will continue to monitor pt and maintain pt safety.
hold vancomycin until next dose, no further BM medications for now, pt. on colace, senna and Miralax
no new orders made

## 2017-07-12 NOTE — PROVIDER CONTACT NOTE (OTHER) - SITUATION
Bradycardia on tele on admission to floor.
HR 33, Brief 2nd degree type 1 heart block on tele
HR javid down to 35 briefly, the lowest yesterday was 34, sinus 1st degree, last BM documented was 7/3/2017
HR to 35.
Notified by telemetry second degree heart block type 1
javid down to 32, possible 3rd degree HB at 03:18
vanco trough from AM = 23.3, hold dose?, last BM = 7/3/2017
Tele Event - 3rd degree heart block?

## 2017-07-12 NOTE — PHYSICAL THERAPY INITIAL EVALUATION ADULT - ADDITIONAL COMMENTS
Came from [place]   lives in house with spouse 3 steps to enter then another 10 steps with rail to apt level ; pt has working glucometer ; owns cane and w/c , viktor Simms [phone] Came from [facility] (Was no longer receiving PT but staying at [facility] per dtr Lorenza who called during session and pt request PT to speak with dtr re session , planning   lives in house with spouse 3 steps to enter then another 10 steps with rail to apt level ; pt has working glucometer ; owns cane and w/c , son Mendez [phone] Came from [facility] (Was no longer receiving PT but staying at [facility] per dtr Lorenza who called during session and pt request PT to speak with dtr re session , planning lives in house with spouse 3 steps to enter then another 10 steps with rail to apt level ; pt has working glucometer ; owns cane and w/c , son Mendez [phone]

## 2017-07-12 NOTE — PHYSICAL THERAPY INITIAL EVALUATION ADULT - IMPAIRMENTS FOUND, PT EVAL
aerobic capacity/endurance/muscle strength aerobic capacity/endurance/gait, locomotion, and balance/muscle strength/posture

## 2017-07-12 NOTE — DIETITIAN INITIAL EVALUATION ADULT. - ENERGY NEEDS
Ht 66 inches Wt 132.2 pounds BMI 21.3 Kg/m^2   pounds +/- 10%; 93% IBW  Edema: None noted Skin: No pressure ulcers  Other pertinent information: 81 yo male with PMH of DM, prostate cancer S[facility] urinary sphinct[facility], now S/P removal. Course complicated by CARL revealing possible CHB, patient/family considering PPM placement

## 2017-07-12 NOTE — PROVIDER CONTACT NOTE (OTHER) - NAME OF MD/NP/PA/DO NOTIFIED:
LAMAR Jones (Urology)
Dr. Cortes, beeper #[id number]
Dr. Eduardo Hebert
Irina NOEL
LAMAR Jones (Urology)
LAMAR Jones (Urology)
Team 3, MD Cortes
Dr. Jhonatan Cortes

## 2017-07-12 NOTE — PHYSICAL THERAPY INITIAL EVALUATION ADULT - PLANNED THERAPY INTERVENTIONS, PT EVAL
balance training/bed mobility training/transfer training/strengthening/gait training strengthening/gait training/postural re-education/balance training/transfer training/bed mobility training

## 2017-07-12 NOTE — PHYSICAL THERAPY INITIAL EVALUATION ADULT - PERTINENT HX OF CURRENT PROBLEM, REHAB EVAL
Patient is a 83 yo M p/w preoperative Abx for removal of eroded AUS. Pt has h/o prostate cancer s/p RRP in 1998. He then had AUS placed for urinary incontinence in 1999. Subsequently did fine until roughly 1 yr ago when he developed worsened incontinence and did not feel the AUS was functioning properly.(eroded on CT 6/23/17 ); CARL 7/10/17 severe MR , possible torn chordae, moderate AR , simple atheroma noted aortic arch and descending aorta , LAE ; EKG 7/4/17 SB w/ primary AVB , LAD

## 2017-07-12 NOTE — DIETITIAN INITIAL EVALUATION ADULT. - FACTORS AFF FOOD INTAKE
Patient presents with partial edentulism but denies difficulty chewing/swallowing and prefers regular diet

## 2017-07-12 NOTE — CHART NOTE - NSCHARTNOTEFT_GEN_A_CORE
Upon Nutritional Assessment by the Registered Dietitian your patient was determined to meet criteria / has evidence of the following diagnosis/diagnoses:             [x ] Severe Protein Calorie Malnutrition           Findings as based on:  [x ] Comprehensive nutrition assessment         Nutrition Plan/Recommendations:    Glucerna 2x daily, continue consistent CHO diet        PROVIDER Section:     By signing this assessment you are acknowledging and agree with the diagnosis/diagnoses assigned by the Registered Dietitian    Comments:

## 2017-07-12 NOTE — PROGRESS NOTE ADULT - SUBJECTIVE AND OBJECTIVE BOX
Reason for Consult: Arrhythmia; Severe Mitral Valve Regurgitation   82 y.o. Gentleman - h.o. T2DM, Prostate CA, no known CV disease (including conduction disease) - p/w urinary incontinence with elective procedure for revision of AUS - and cardiology consulted for sinus bradycardia with 1' AVB (without prior EKGs); ultimately discovered to have asymptomatic severe MR (Flail A2 Scallop, torn chordae, moderate AI), no PHTN, normal LV cavity dimensions and then found to have CHB with stable junctional rhythm. No evidence of vegetations  ========================================== Overnight Events - Appreciate CTS & EP recs - Stable junctional escape; no CP/SOB/Palps/AMS - At this point, reluctant to pursue cardiac interventions  ==========================================	        PAST MEDICAL & SURGICAL HISTORY:  Prostate CA  Diabetes mellitus  Urinary incontinence  H/O prostatectomy      PREVIOUS DIAGNOSTIC TESTING:: unknown    HOME MEDICATIONS :     MEDICATIONS  (STANDING):  MEDICATIONS  (STANDING):  aspirin enteric coated 81 milliGRAM(s) Oral daily  atorvastatin 10 milliGRAM(s) Oral at bedtime  hydrochlorothiazide 12.5 milliGRAM(s) Oral daily  docusate sodium 100 milliGRAM(s) Oral two times a day  sodium chloride 0.9% lock flush 3 milliLiter(s) IV Push every 8 hours  heparin  Injectable 5000 Unit(s) SubCutaneous every 8 hours  losartan 25 milliGRAM(s) Oral daily  vancomycin  IVPB 1000 milliGRAM(s) IV Intermittent every 12 hours  vancomycin  IVPB   IV Intermittent   senna 2 Tablet(s) Oral at bedtime  polyethylene glycol 3350 17 Gram(s) Oral daily  cefTRIAXone   IVPB 2 Gram(s) IV Intermittent every 24 hours        MEDICATIONS  (PRN):      FAMILY HISTORY:  No pertinent family history in first degree relatives    Allergies    No Known Allergies    Intolerances      PHYSICAL EXAM    T(C): 36.6 (17 @ 12:13), Max: 37.1 (17 @ 20:45)  HR: 57 (17 @ 12:13) (52 - 64)  BP: 113/68 (17 @ 12:13) (94/50 - 128/49)  RR: 18 (17 @ 12:13) (18 - 18)  SpO2: 96% (17 @ 12:13) (96% - 98%)  Wt(kg): --  Daily     Daily Weight in k.9 (2017 14:33)  I&O's Summary    2017 07:01  -  2017 07:00  --------------------------------------------------------  IN: 660 mL / OUT: 800 mL / NET: -140 mL      PHYSICAL EXAM:      Constitutional: NAD, alert    Eyes: No visual abnormalities    ENMT: No JVD    Respiratory: CTAB    Cardiovascular: Bradycardic; S1, S2, III HSM radiating to the back     Gastrointestinal: Soft NT ND     Genitourinary: Bladder device in     Extremities:no LE edema, no skin prupura    Vascular: Good distal pulses    Neurological: AOX3 no focal deficits    Skin: no bruising, no petechiae                                      13.9   7.3   )-----------( 188      ( 2017 07:06 )             40.1     07-04    140  |  103  |  20  ----------------------------<  108<H>  3.7   |  27  |  1.06    Ca    9.0      2017 07:06    PT/INR - ( 2017 19:29 )   PT: 11.7 sec;   INR: 1.07 ratio         PTT - ( 2017 19:29 )  PTT:35.2 sec      < from: Transesophageal Echocardiogram w/o TTE (07.10.17 @ 09:20) >  Observations:  Mitral Valve: Mitral valve prolapse involving the anterior  mitral leaflet. The A2 scallop of the anterior leaflet is  partially flail. A linear mobile echodensity consistent  with torn chordae is seen attached to the tip of the A2  scallop. Severe, eccentric, posteriorly-directed mitral  regurgitation.  Aortic Valve/Aorta: Calcified trileaflet aortic valve with  normal opening. Moderate aortic regurgitation. Vena  contractaabout 0.4-0.5 cm.  Normal aortic root and ascending aorta. Simple atheroma  noted in the aortic arch and descending aorta.  Left Atrium: Left atrial enlargement. No left atrial or  left atrial appendage thrombus. Normal left atrial  appendage function (velocity>60 cm/s).  Left Ventricle: Normal left ventricular systolic function.  No segmental wall motion abnormalities.  Right Heart: Normal right atrium. Normal right ventricular  size and function. Normal tricuspid valve. Mild tricuspid  regurgitation. Normal pulmonic valve. Minimal pulmonic  regurgitation.  Pericardium/Pleura: Normal pericardium with no pericardial  effusion.  Hemodynamic: Estimated right atrial pressure is 8 mm Hg.  Estimated right ventricular systolic pressure equals 38 mm  Hg, assuming right atrial pressure equals 8 mm Hg,  consistent with borderline pulmonary hypertension. Agitated  saline injection and color flow Doppler demonstrate no  evidence of a patent foramen ovale.  ------------------------------------------------------------------------  Conclusions:  1. Mitral valve prolapse involving the anterior mitral  leaflet. The A2 scallop of the anterior leaflet is  partially flail. A linear mobile echodensity consistent  with torn chordae is seen attached to the tip of the A2  scallop. Severe, eccentric, posteriorly-directed mitral  regurgitation.  2. Calcified trileaflet aortic valve with normal opening.  Moderate aortic regurgitation. Vena contractaabout 0.4-0.5  cm.  3. Normal aortic root and ascending aorta. Simple atheroma  noted in the aortic arch and descending aorta.  4. Left atrial enlargement. No left atrial or left atrial  appendage thrombus. Normal left atrial appendage function  (velocity>60 cm/s).  5. Normal left ventricular systolic function. No segmental  wall motion abnormalities.  6. Normal right ventricular size and function.  7. Agitated saline injection and color flow Doppler  demonstrate no evidence ofa patent foramen ovale.  No definite vegetations seen. Partially flail A2 scallop of  the mitral valve. If clinical suspicion for endocarditis  remains high, consider repeat CARL in approximately 7-10  days.  ------------------------------------------------------------------------  Confirmed on  7/10/2017 - 12:21:31 by Carlitos Marie M.D.    < end of copied text >      < from: Transthoracic Echocardiogram (17 @ 21:18) >  Observations:  Mitral Valve: Bileaflet mitral valve prolapse (anterior >  posterior).There is focal thickening on the atrial side of  the anterior mitral valve. This is suspicous for vegetation  in the appropriate clinical setting.  At least moderate,  eccentric mitral regurgitation (may be underestimated)  Aortic Valve/Aorta: Calcified trileaflet aortic valve with  normal opening. Mild-moderate aortic regurgitation.  Upper limits of normal aortic root size for BSA.  (Ao: 3.8  cm at the sinuses of Valsalva).  Left Atrium: Normal left atrium.  LA volume index = 21  cc/m2.  Left Ventricle: Normal left ventricular systolic function.  No segmental wall motion abnormalities. Normal left  ventricular internal dimensions and wall thicknesses. Basal  septalhypertrophy present. Reversal of the E-A waves of  the mitral inflow pattern consistent with reduced  compliance of the left ventricle.  Right Heart: Normal right atrium. Normal right ventricular  size and function. Normal tricuspid valve. Mild tricuspid  regurgitation. Normal pulmonic valve. Mild pulmonic  regurgitation.  Pericardium/Pleura: No pericardial effusion seen.  Hemodynamic: Estimated right atrial pressure is 8 mm Hg.  Estimated right ventricular systolic pressure equals 31 mm  Hg, assuming right atrial pressure equals 8 mm Hg,  consistent with normal pulmonary pressures.  ------------------------------------------------------------------------  Conclusions:  1. 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 This is suspicous for vegetation  in the appropriate clinical setting.  At least moderate,  eccentric mitral regurgitation (may be underestimated)  2. Calcified trileaflet aortic valve withnormal opening.  Mild-moderate aortic regurgitation.  3. Normal left ventricular systolic function. No segmental  wall motion abnormalities.  4. Normal right ventricular size and function.  *** No previous Echo exam. Dr. Richter notified of  findings at 1130 AM on 17. CARL could better evaluate  the mitral and aortic valves if clinically indicated.  ------------------------------------------------------------------------  Confirmed on  2017 - 11:34:46 by Krunal Allen M.D.  ---------------------------------------------------------------------    < end of copied text >      Tele: Sinus 50 - 70s with intermittent Mobitz I/II with transient bradycardia

## 2017-07-12 NOTE — DIETITIAN INITIAL EVALUATION ADULT. - NS FNS REASON FOR WEIGHT CHANG
Pt reports current weight of 132 pounds- consistent with weight 7/12/17 of 132.2 pounds. States he was lost approximately 40 pounds in 6 months from UBW of 172 pounds. Attributes weight loss to distaste for institutionalized food at rehab/during this admission and poor po intake./decreased po intake

## 2017-07-12 NOTE — PROVIDER CONTACT NOTE (OTHER) - REASON
Bradycardia on tele on admission to floor.
HR 33, Brief 2nd degree type 1 heart block on tele
HR javid down to 35 briefly, the lowest yesterday was 34, sinus 1st degree, last BM documented was 7/3/2017
HR to 35.
Second degree heart block type 1
Vanco trough from AM = 23.3, hold dose?, last BM = 7/3/2017
javid down to 32, possible 3rd degree HB at 03:18, back in sinus 50s.
Tele Event

## 2017-07-12 NOTE — CONSULT NOTE ADULT - CONSULT REQUESTED DATE/TIME
04-Jul-2017 23:00
07-Jul-2017 12:00
07-Jul-2017 13:20
07-Jul-2017 18:01
12-Jul-2017 11:00
08-Jul-2017 14:00

## 2017-07-12 NOTE — CONSULT NOTE ADULT - SUBJECTIVE AND OBJECTIVE BOX
HPI:    83 y/o male w/ PMH prostate Ca 90's, s/p artificial urinary sphincter (AUS), T2DM, HTN, HLD, admitted for erosion of AUS requiring removal. Preoperative TTE revealed severe MR w/ bileaflet proplase. Pt. seen and evaluated for consideration of mitraclip. Pt. is cantonese speaking, history obtained from Daughter Lorenza. Reports father fell in January 2017, and has been in SNF since then with decline in functional status. Prior to injury pt. was living at home w/ spouse, independent in all ADLs. Since fall he is wheelchair bound w/ limited ambulation requiring total assistance. She reports no known history of MR, and states he has been asymptomatic. Denies CP, SOB, palp, BERNAL, PND/Orthopnea, LE edema or syncope.        PAST MEDICAL & SURGICAL HISTORY:  Prostate CA  Diabetes mellitus  Urinary incontinence  H/O prostatectomy      REVIEW OF SYSTEMS:    CONSTITUTIONAL: No weakness, fevers or chills  EYES/ENT: No visual changes;  No vertigo or throat pain   NECK: No pain or stiffness  RESPIRATORY: No cough, wheezing, hemoptysis; No shortness of breath  CARDIOVASCULAR: No chest pain or palpitations  GASTROINTESTINAL: No abdominal or epigastric pain. No nausea, vomiting, or hematemesis; No diarrhea or constipation. No melena or hematochezia.  GENITOURINARY: No dysuria, frequency or hematuria  NEUROLOGICAL: No numbness or weakness  SKIN: No itching, rashes      MEDICATIONS  (STANDING):  aspirin enteric coated 81 milliGRAM(s) Oral daily  atorvastatin 10 milliGRAM(s) Oral at bedtime  hydrochlorothiazide 12.5 milliGRAM(s) Oral daily  docusate sodium 100 milliGRAM(s) Oral two times a day  sodium chloride 0.9% lock flush 3 milliLiter(s) IV Push every 8 hours  heparin  Injectable 5000 Unit(s) SubCutaneous every 8 hours  losartan 25 milliGRAM(s) Oral daily  senna 2 Tablet(s) Oral at bedtime  polyethylene glycol 3350 17 Gram(s) Oral daily  insulin lispro (HumaLOG) corrective regimen sliding scale   SubCutaneous three times a day before meals  insulin lispro (HumaLOG) corrective regimen sliding scale   SubCutaneous at bedtime  dextrose 5%. 1000 milliLiter(s) (50 mL/Hr) IV Continuous <Continuous>  dextrose 50% Injectable 12.5 Gram(s) IV Push once  dextrose 50% Injectable 25 Gram(s) IV Push once  dextrose 50% Injectable 25 Gram(s) IV Push once    MEDICATIONS  (PRN):  bisacodyl Suppository 10 milliGRAM(s) Rectal daily PRN Constipation  dextrose Gel 1 Dose(s) Oral once PRN Blood Glucose LESS THAN 70 milliGRAM(s)/deciliter  glucagon  Injectable 1 milliGRAM(s) IntraMuscular once PRN Glucose LESS THAN 70 milligrams/deciliter      Allergies    No Known Allergies    Intolerances        SOCIAL HISTORY:    FAMILY HISTORY:  No pertinent family history in first degree relatives      Vital Signs Last 24 Hrs  T(C): 36.6 (12 Jul 2017 12:13), Max: 37.1 (11 Jul 2017 20:45)  T(F): 97.9 (12 Jul 2017 12:13), Max: 98.8 (11 Jul 2017 20:45)  HR: 57 (12 Jul 2017 12:13) (52 - 85)  BP: 113/68 (12 Jul 2017 12:13) (94/50 - 128/49)  BP(mean): --  RR: 18 (12 Jul 2017 12:13) (18 - 18)  SpO2: 96% (12 Jul 2017 12:13) (96% - 98%)    Physical Exam  General: A/ox 3, No acute Distress  Neck: Supple, NO JVD  Cardiac: S1 S2, No M/R/G  Pulmonary: CTAB, Breathing unlabored, No Rhonchi/Rales/Wheezing  Abdomen: Soft, Non -tender, +BS x 4 quads  Extremities: No Rashes, No edema  Neuro: A/o x 3, No focal deficits    LABS:          PT/INR - ( 12 Jul 2017 06:50 )   PT: 12.1 sec;   INR: 1.11 ratio         PTT - ( 12 Jul 2017 06:50 )  PTT:52.0 sec      RADIOLOGY & ADDITIONAL STUDIES: HPI:    83 y/o male w/ PMH prostate Ca 90's, s/p artificial urinary sphincter (AUS), T2DM, HTN, HLD, admitted for erosion of AUS requiring removal. Preoperative TTE revealed severe MR w/ bileaflet proplase. Pt. seen and evaluated for consideration of mitraclip. Pt. is cantonese speaking, history obtained from Daughter Lorenza. Reports father fell in January 2017, and has been in SNF since then with decline in functional status. Prior to injury pt. was living at home w/ spouse, independent in all ADLs. Since fall he is wheelchair bound w/ limited ambulation requiring total assistance. She reports no known history of MR, and states he has been asymptomatic. Denies CP, SOB, palp, BERNAL, PND/Orthopnea, LE edema or syncope.        PAST MEDICAL & SURGICAL HISTORY:  Prostate CA  Diabetes mellitus  Urinary incontinence  H/O prostatectomy      REVIEW OF SYSTEMS:    CONSTITUTIONAL: No weakness, fevers or chills  EYES/ENT: No visual changes;  No vertigo or throat pain   NECK: No pain or stiffness  RESPIRATORY: No cough, wheezing, hemoptysis; No shortness of breath  CARDIOVASCULAR: No chest pain or palpitations  GASTROINTESTINAL: No abdominal or epigastric pain. No nausea, vomiting, or hematemesis; No diarrhea or constipation. No melena or hematochezia.  GENITOURINARY: No dysuria, frequency or hematuria  NEUROLOGICAL: No numbness or weakness  SKIN: No itching, rashes      MEDICATIONS  (STANDING):  aspirin enteric coated 81 milliGRAM(s) Oral daily  atorvastatin 10 milliGRAM(s) Oral at bedtime  hydrochlorothiazide 12.5 milliGRAM(s) Oral daily  docusate sodium 100 milliGRAM(s) Oral two times a day  sodium chloride 0.9% lock flush 3 milliLiter(s) IV Push every 8 hours  heparin  Injectable 5000 Unit(s) SubCutaneous every 8 hours  losartan 25 milliGRAM(s) Oral daily  senna 2 Tablet(s) Oral at bedtime  polyethylene glycol 3350 17 Gram(s) Oral daily  insulin lispro (HumaLOG) corrective regimen sliding scale   SubCutaneous three times a day before meals  insulin lispro (HumaLOG) corrective regimen sliding scale   SubCutaneous at bedtime  dextrose 5%. 1000 milliLiter(s) (50 mL/Hr) IV Continuous <Continuous>  dextrose 50% Injectable 12.5 Gram(s) IV Push once  dextrose 50% Injectable 25 Gram(s) IV Push once  dextrose 50% Injectable 25 Gram(s) IV Push once    MEDICATIONS  (PRN):  bisacodyl Suppository 10 milliGRAM(s) Rectal daily PRN Constipation  dextrose Gel 1 Dose(s) Oral once PRN Blood Glucose LESS THAN 70 milliGRAM(s)/deciliter  glucagon  Injectable 1 milliGRAM(s) IntraMuscular once PRN Glucose LESS THAN 70 milligrams/deciliter      Allergies    No Known Allergies    Intolerances        SOCIAL HISTORY: Has been residing in SNF since February after fall at home, is now dependent for all ADLs, limited mobility.    FAMILY HISTORY:  No pertinent family history in first degree relatives      Vital Signs Last 24 Hrs  T(C): 36.6 (12 Jul 2017 12:13), Max: 37.1 (11 Jul 2017 20:45)  T(F): 97.9 (12 Jul 2017 12:13), Max: 98.8 (11 Jul 2017 20:45)  HR: 57 (12 Jul 2017 12:13) (52 - 85)  BP: 113/68 (12 Jul 2017 12:13) (94/50 - 128/49)  BP(mean): --  RR: 18 (12 Jul 2017 12:13) (18 - 18)  SpO2: 96% (12 Jul 2017 12:13) (96% - 98%)    Physical Exam  General: A/ox 3, No acute Distress  Neck: Supple, NO JVD  Cardiac: S1 S2, III/VI murmur  Pulmonary: CTAB, Breathing unlabored, No Rhonchi/Rales/Wheezing  Abdomen: Soft, Non -tender, +BS x 4 quads  Extremities: No Rashes, No edema, (+) DP pulses B/L  Neuro: A/o x 3, No focal deficits    LABS:    PT/INR - ( 12 Jul 2017 06:50 )   PT: 12.1 sec;   INR: 1.11 ratio         PTT - ( 12 Jul 2017 06:50 )  PTT:52.0 sec      RADIOLOGY & ADDITIONAL STUDIES:  < from: Transesophageal Echocardiogram w/o TTE (07.10.17 @ 09:20) >  Conclusions:  1. Mitral valve prolapse involving the anterior mitral  leaflet. The A2 scallop of the anterior leaflet is  partially flail. A linear mobile echodensity consistent  with torn chordae is seen attached to the tip of the A2  scallop. Severe, eccentric, posteriorly-directed mitral  regurgitation.  2. Calcified trileaflet aortic valve with normal opening.  Moderate aortic regurgitation. Vena contractaabout 0.4-0.5  cm.  3. Normal aortic root and ascending aorta. Simple atheroma  noted in the aortic arch and descending aorta.  4. Left atrial enlargement. No left atrial or left atrial  appendage thrombus. Normal left atrial appendage function  (velocity>60 cm/s).  5. Normal left ventricular systolic function. No segmental  wall motion abnormalities.  6. Normal right ventricular size and function.  7. Agitated saline injection and color flow Doppler  demonstrate no evidence ofa patent foramen ovale.  No definite vegetations seen. Partially flail A2 scallop of  the mitral valve. If clinical suspicion for endocarditis  remains high, consider repeat CARL in approximately 7-10  days.    < end of copied text >

## 2017-07-12 NOTE — CONSULT NOTE ADULT - ATTENDING COMMENTS
Debbi Zuniga  Pager: [phone]. If no response or past 5 pm call [phone].
82M with A2 flail and severe MR.  Extensive discussion with pt and family regarding the natural progression of MR with worsening heart failure and the risks and benefits of mitraclip.  Pt and family declining mitraclip at this time.

## 2017-07-12 NOTE — DIETITIAN INITIAL EVALUATION ADULT. - OTHER INFO
Patient seen for length of stay. Reports poor appetite during this admission, attributes to distaste for institutionalized food and preference for traditional Cantonese meals. Reports NKFA. Denies vitamin/mineral supplementation PTA. Denies nausea/emesis during this admission. States last BM was 7/11, denies GI distress at this time. Patient amenable to trying Glucerna 2x daily during this admission to increase po intake. Denied food preferences at this time

## 2017-07-12 NOTE — DIETITIAN INITIAL EVALUATION ADULT. - ADHERENCE
Patient transferred from rehab where he was receiving consistent CHO diet. Reports long history of DM and states that PTA he was taking oral hypoglycemic agent daily. States he was checking fingersticks 1x daily with usual BG of 100mg/dl and denies hypoglycemic events. Denies need for education regarding diabetes at this time

## 2017-07-12 NOTE — PROVIDER CONTACT NOTE (OTHER) - ASSESSMENT
denies abdominal discomfort
Pt A+Ox4. VSS as documented. Pt denies any pain, distress, or discomfort.
Pt A+Ox4. HR to 35 briefly on tele -returned to SB 40's. Pt asymptomatic, sleeping -denies any pain, distress, or discomfort.
Pt AOx4. pt was sleeping at the time of this event. pt asymptomatic. Pt denies sob, chest pain or any other discomfort. BP 94/50 HR 57 RR 18 Temp 98.3 F, O2 98% on room air.
Pt received from PACU. Pt A+Ox4. VSS as documented. Pt SB, 1st degree block on tele. TN 0.35. HR to 45. Pt asymptomatic -denies any pain, distress, or discomfort. Pt for echo tomorrow.
VSS. asymptomatic.
VSS. asymptomatic. pt. only on colace.
stable in no distress, resting comfortably.  No sob, no palpitations, denies cp. denies headache, no vision disturbances. VSS. afebrile- see flow sheet

## 2017-07-12 NOTE — PHYSICAL THERAPY INITIAL EVALUATION ADULT - PRECAUTIONS/LIMITATIONS, REHAB EVAL
fall precautions fall precautions/82 y.o. Gentleman - h.o. T2DM, Prostate CA, no known CV disease (including conduction disease) - p/w urinary incontinence with elective procedure for revision of AUS - and cardiology consulted for sinus bradycardia with 1' AVB (without prior EKGs); ultimately discovered to have asymptomatic severe MR (Flail A2 Scallop, torn chordae, moderate AI), no PHTN, normal LV cavity dimensions and then found to have CHB with stable junctional rhythm. No evidence of vegetations; pt currently refusing ppm ; episode 1 am HR 33 brief 2nd degree HB on tele

## 2017-07-12 NOTE — PHYSICAL THERAPY INITIAL EVALUATION ADULT - GENERAL OBSERVATIONS, REHAB EVAL
pt received in bed nad top rails up , 1 siderail + bed alarm active ; spoke with major Diaz and tele tech + HR drop to 33 type 1 HB 1 am then drop again 7 am , 8 am, 8:38 am to 31 HELD OOB at this time , Edward Land aware and present in room

## 2017-07-12 NOTE — PROGRESS NOTE ADULT - SUBJECTIVE AND OBJECTIVE BOX
Date of Admission: 7/3/2017    24H hour events: No acute events overnight.      MEDICATIONS:  aspirin enteric coated 81 milliGRAM(s) Oral daily  hydrochlorothiazide 12.5 milliGRAM(s) Oral daily  heparin  Injectable 5000 Unit(s) SubCutaneous every 8 hours  losartan 25 milliGRAM(s) Oral daily          docusate sodium 100 milliGRAM(s) Oral two times a day  senna 2 Tablet(s) Oral at bedtime  polyethylene glycol 3350 17 Gram(s) Oral daily  bisacodyl Suppository 10 milliGRAM(s) Rectal daily PRN    atorvastatin 10 milliGRAM(s) Oral at bedtime  insulin lispro (HumaLOG) corrective regimen sliding scale   SubCutaneous three times a day before meals  insulin lispro (HumaLOG) corrective regimen sliding scale   SubCutaneous at bedtime  dextrose Gel 1 Dose(s) Oral once PRN  dextrose 50% Injectable 12.5 Gram(s) IV Push once  dextrose 50% Injectable 25 Gram(s) IV Push once  dextrose 50% Injectable 25 Gram(s) IV Push once  glucagon  Injectable 1 milliGRAM(s) IntraMuscular once PRN    dextrose 5%. 1000 milliLiter(s) IV Continuous <Continuous>      REVIEW OF SYSTEMS:  General: no fatigue/malaise, weight loss/gain.  Skin: no rashes.  Ophthalmologic: no blurred vision, no loss of vision. 	  ENT: no sore throat, rhinorrhea, sinus congestion.  Respiratory: no SOB, cough or wheeze.  Gastrointestinal:  no N/V/D, no melena/hematemesis/hematochezia.  Genitourinary: no dysuria/hesitancy or hematuria.  Musculoskeletal: no myalgias or arthralgias.  Neurological: no changes in vision or hearing, no lightheadedness/dizziness, no syncope/near syncope	  Psychiatric: no unusual stress/anxiety.   Hematology/Lymphatics: no unusual bleeding, bruising and no lymphadenopathy.  Endocrine: no unusual thirst.   All others negative except as stated above and in HPI.    PHYSICAL EXAM:  T(C): 36.7 (07-11-17 @ 14:52), Max: 36.7 (07-11-17 @ 14:52)  HR: 85 (07-11-17 @ 14:52) (58 - 86)  BP: 109/78 (07-11-17 @ 14:52) (109/60 - 146/61)  RR: 18 (07-11-17 @ 14:52) (16 - 18)  SpO2: 96% (07-11-17 @ 14:52) (96% - 98%)  Wt(kg): --  I&O's Summary    10 Jul 2017 07:01  -  11 Jul 2017 07:00  --------------------------------------------------------  IN: 880 mL / OUT: 1900 mL / NET: -1020 mL    11 Jul 2017 07:01  -  11 Jul 2017 20:15  --------------------------------------------------------  IN: 520 mL / OUT: 800 mL / NET: -280 mL        Appearance: Normal	  HEENT:   Normal oral mucosa, PERRL, EOMI	  Lymphatic: No lymphadenopathy  Cardiovascular: Normal S1 S2, No JVD, No murmurs, No edema  Respiratory: Lungs clear to auscultation	  Psychiatry: A & O x 3, Mood & affect appropriate  Gastrointestinal:  Soft, Non-tender, + BS	  Skin: No rashes, No ecchymoses, No cyanosis	  Neurologic: Non-focal  Extremities: Normal range of motion, No clubbing, cyanosis or edema  Vascular: Peripheral pulses palpable 2+ bilaterally        LABS:	 	    CBC Full  -  ( 10 Jul 2017 06:04 )  WBC Count : 6.8 K/uL  Hemoglobin : 12.7 g/dL  Hematocrit : 42.0 %  Platelet Count - Automated : 216 K/uL  Mean Cell Volume : 91.1 fl  Mean Cell Hemoglobin : 27.6 pg  Mean Cell Hemoglobin Concentration : 30.3 gm/dL  Auto Neutrophil # : x  Auto Lymphocyte # : x  Auto Monocyte # : x  Auto Eosinophil # : x  Auto Basophil # : x  Auto Neutrophil % : x  Auto Lymphocyte % : x  Auto Monocyte % : x  Auto Eosinophil % : x  Auto Basophil % : x    07-10    140  |  103  |  19  ----------------------------<  106<H>  3.5   |  25  |  0.97    Ca    9.3      10 Jul 2017 06:04  Phos  3.3     07-10  Mg     2.0     07-10    Tele: Sinus 50 - 70s with intermittent Mobitz I/II with transient bradycardia    ASSESSMENT/PLAN: 	    81 y/o man with h/o with DM, Prostate Cancer s/p Prostatectomy, Ureteral Artificial Sphincter placement for incontinence found to have intermittent CHB on tele    1) Intermittent CHB - no longer thought to have IE, and remains a candidate for PPM placement, discussed with patient using language line (Interpretor #[id number]) to discuss risks and benefits of PPM placement, but patient referred to discuss further with family before making any decisions regarding implantation Date of Admission: 7/3/2017    24H hour events: No acute events overnight.      MEDICATIONS:  aspirin enteric coated 81 milliGRAM(s) Oral daily  hydrochlorothiazide 12.5 milliGRAM(s) Oral daily  heparin  Injectable 5000 Unit(s) SubCutaneous every 8 hours  losartan 25 milliGRAM(s) Oral daily          docusate sodium 100 milliGRAM(s) Oral two times a day  senna 2 Tablet(s) Oral at bedtime  polyethylene glycol 3350 17 Gram(s) Oral daily  bisacodyl Suppository 10 milliGRAM(s) Rectal daily PRN    atorvastatin 10 milliGRAM(s) Oral at bedtime  insulin lispro (HumaLOG) corrective regimen sliding scale   SubCutaneous three times a day before meals  insulin lispro (HumaLOG) corrective regimen sliding scale   SubCutaneous at bedtime  dextrose Gel 1 Dose(s) Oral once PRN  dextrose 50% Injectable 12.5 Gram(s) IV Push once  dextrose 50% Injectable 25 Gram(s) IV Push once  dextrose 50% Injectable 25 Gram(s) IV Push once  glucagon  Injectable 1 milliGRAM(s) IntraMuscular once PRN    dextrose 5%. 1000 milliLiter(s) IV Continuous <Continuous>      REVIEW OF SYSTEMS:  General: no fatigue/malaise, weight loss/gain.  Skin: no rashes.  Ophthalmologic: no blurred vision, no loss of vision. 	  ENT: no sore throat, rhinorrhea, sinus congestion.  Respiratory: no SOB, cough or wheeze.  Gastrointestinal:  no N/V/D, no melena/hematemesis/hematochezia.  Genitourinary: no dysuria/hesitancy or hematuria.  Musculoskeletal: no myalgias or arthralgias.  Neurological: no changes in vision or hearing, no lightheadedness/dizziness, no syncope/near syncope	  Psychiatric: no unusual stress/anxiety.   Hematology/Lymphatics: no unusual bleeding, bruising and no lymphadenopathy.  Endocrine: no unusual thirst.   All others negative except as stated above and in HPI.    PHYSICAL EXAM:  T(C): 36.7 (07-11-17 @ 14:52), Max: 36.7 (07-11-17 @ 14:52)  HR: 85 (07-11-17 @ 14:52) (58 - 86)  BP: 109/78 (07-11-17 @ 14:52) (109/60 - 146/61)  RR: 18 (07-11-17 @ 14:52) (16 - 18)  SpO2: 96% (07-11-17 @ 14:52) (96% - 98%)  Wt(kg): --  I&O's Summary    10 Jul 2017 07:01  -  11 Jul 2017 07:00  --------------------------------------------------------  IN: 880 mL / OUT: 1900 mL / NET: -1020 mL    11 Jul 2017 07:01  -  11 Jul 2017 20:15  --------------------------------------------------------  IN: 520 mL / OUT: 800 mL / NET: -280 mL        Appearance: Normal	  HEENT:   Normal oral mucosa, PERRL, EOMI	  Lymphatic: No lymphadenopathy  Cardiovascular: Normal S1 S2, No JVD, No murmurs, No edema  Respiratory: Lungs clear to auscultation	  Psychiatry: A & O x 3, Mood & affect appropriate  Gastrointestinal:  Soft, Non-tender, + BS	  Skin: No rashes, No ecchymoses, No cyanosis	  Neurologic: Non-focal  Extremities: Normal range of motion, No clubbing, cyanosis or edema  Vascular: Peripheral pulses palpable 2+ bilaterally        LABS:	 	    CBC Full  -  ( 10 Jul 2017 06:04 )  WBC Count : 6.8 K/uL  Hemoglobin : 12.7 g/dL  Hematocrit : 42.0 %  Platelet Count - Automated : 216 K/uL  Mean Cell Volume : 91.1 fl  Mean Cell Hemoglobin : 27.6 pg  Mean Cell Hemoglobin Concentration : 30.3 gm/dL  Auto Neutrophil # : x  Auto Lymphocyte # : x  Auto Monocyte # : x  Auto Eosinophil # : x  Auto Basophil # : x  Auto Neutrophil % : x  Auto Lymphocyte % : x  Auto Monocyte % : x  Auto Eosinophil % : x  Auto Basophil % : x    07-10    140  |  103  |  19  ----------------------------<  106<H>  3.5   |  25  |  0.97    Ca    9.3      10 Jul 2017 06:04  Phos  3.3     07-10  Mg     2.0     07-10    Tele: Sinus 50 - 70s with intermittent Mobitz I/II with transient bradycardia    ASSESSMENT/PLAN: 	    81 y/o man with h/o with DM, Prostate Cancer s/p Prostatectomy, Ureteral Artificial Sphincter placement for incontinence found to have intermittent CHB on tele    1) Intermittent CHB - no longer thought to have IE, and remains a candidate for PPM placement, discussed with patient using language line (Interpretor #[id number]) to discuss risks and benefits of PPM placement, but patient was still unwilling to consent for the procedure

## 2017-07-12 NOTE — PROVIDER CONTACT NOTE (OTHER) - DATE AND TIME:
07-Jul-2017 04:24
09-Jul-2017 20:40
06-Jul-2017 23:25
07-Jul-2017 04:19
08-Jul-2017 01:10
09-Jul-2017 04:30
10-Jul-2017 23:45
12-Jul-2017 01:00

## 2017-07-12 NOTE — PHYSICAL THERAPY INITIAL EVALUATION ADULT - PATIENT/FAMILY AGREES WITH PLAN
family would like pt to return to [facility] yes/[facility]

## 2017-07-12 NOTE — DIETITIAN INITIAL EVALUATION ADULT. - NUTRITION INTERVENTION
Meals and Snack/Collaboration and Referral of Nutrition Care/Medical Food Supplements/Nutrition Education

## 2017-07-12 NOTE — DIETITIAN INITIAL EVALUATION ADULT. - ORAL INTAKE PTA
poor/Patient reports poor po intake PTA. Attributes poor intake to distaste for institutionalized food at rehab and states that he prefers traditional [place]onese foods. States he has had Glucerna in the past with good po intake.

## 2017-07-13 LAB
ANION GAP SERPL CALC-SCNC: 13 MMOL/L — SIGNIFICANT CHANGE UP (ref 5–17)
BUN SERPL-MCNC: 27 MG/DL — HIGH (ref 7–23)
CALCIUM SERPL-MCNC: 9.7 MG/DL — SIGNIFICANT CHANGE UP (ref 8.4–10.5)
CHLORIDE SERPL-SCNC: 104 MMOL/L — SIGNIFICANT CHANGE UP (ref 96–108)
CO2 SERPL-SCNC: 25 MMOL/L — SIGNIFICANT CHANGE UP (ref 22–31)
CREAT SERPL-MCNC: 1.03 MG/DL — SIGNIFICANT CHANGE UP (ref 0.5–1.3)
GLUCOSE SERPL-MCNC: 103 MG/DL — HIGH (ref 70–99)
HCT VFR BLD CALC: 38 % — LOW (ref 39–50)
HGB BLD-MCNC: 12.7 G/DL — LOW (ref 13–17)
MCHC RBC-ENTMCNC: 30.9 PG — SIGNIFICANT CHANGE UP (ref 27–34)
MCHC RBC-ENTMCNC: 33.5 GM/DL — SIGNIFICANT CHANGE UP (ref 32–36)
MCV RBC AUTO: 92.1 FL — SIGNIFICANT CHANGE UP (ref 80–100)
PLATELET # BLD AUTO: 215 K/UL — SIGNIFICANT CHANGE UP (ref 150–400)
POTASSIUM SERPL-MCNC: 3.4 MMOL/L — LOW (ref 3.5–5.3)
POTASSIUM SERPL-SCNC: 3.4 MMOL/L — LOW (ref 3.5–5.3)
RBC # BLD: 4.12 M/UL — LOW (ref 4.2–5.8)
RBC # FLD: 13.8 % — SIGNIFICANT CHANGE UP (ref 10.3–14.5)
SODIUM SERPL-SCNC: 142 MMOL/L — SIGNIFICANT CHANGE UP (ref 135–145)
WBC # BLD: 6.4 K/UL — SIGNIFICANT CHANGE UP (ref 3.8–10.5)
WBC # FLD AUTO: 6.4 K/UL — SIGNIFICANT CHANGE UP (ref 3.8–10.5)

## 2017-07-13 PROCEDURE — 99233 SBSQ HOSP IP/OBS HIGH 50: CPT | Mod: GC

## 2017-07-13 PROCEDURE — 99232 SBSQ HOSP IP/OBS MODERATE 35: CPT

## 2017-07-13 RX ORDER — POTASSIUM CHLORIDE 20 MEQ
40 PACKET (EA) ORAL ONCE
Qty: 0 | Refills: 0 | Status: COMPLETED | OUTPATIENT
Start: 2017-07-13 | End: 2017-07-13

## 2017-07-13 RX ADMIN — POLYETHYLENE GLYCOL 3350 17 GRAM(S): 17 POWDER, FOR SOLUTION ORAL at 09:39

## 2017-07-13 RX ADMIN — SODIUM CHLORIDE 3 MILLILITER(S): 9 INJECTION INTRAMUSCULAR; INTRAVENOUS; SUBCUTANEOUS at 06:21

## 2017-07-13 RX ADMIN — Medication 100 MILLIGRAM(S): at 06:10

## 2017-07-13 RX ADMIN — Medication 40 MILLIEQUIVALENT(S): at 09:39

## 2017-07-13 RX ADMIN — LOSARTAN POTASSIUM 25 MILLIGRAM(S): 100 TABLET, FILM COATED ORAL at 06:10

## 2017-07-13 RX ADMIN — SODIUM CHLORIDE 3 MILLILITER(S): 9 INJECTION INTRAMUSCULAR; INTRAVENOUS; SUBCUTANEOUS at 13:06

## 2017-07-13 RX ADMIN — Medication 100 MILLIGRAM(S): at 17:00

## 2017-07-13 RX ADMIN — Medication 81 MILLIGRAM(S): at 09:39

## 2017-07-13 RX ADMIN — SODIUM CHLORIDE 3 MILLILITER(S): 9 INJECTION INTRAMUSCULAR; INTRAVENOUS; SUBCUTANEOUS at 23:32

## 2017-07-13 RX ADMIN — HEPARIN SODIUM 5000 UNIT(S): 5000 INJECTION INTRAVENOUS; SUBCUTANEOUS at 23:35

## 2017-07-13 RX ADMIN — HEPARIN SODIUM 5000 UNIT(S): 5000 INJECTION INTRAVENOUS; SUBCUTANEOUS at 13:07

## 2017-07-13 RX ADMIN — ATORVASTATIN CALCIUM 10 MILLIGRAM(S): 80 TABLET, FILM COATED ORAL at 23:35

## 2017-07-13 RX ADMIN — HEPARIN SODIUM 5000 UNIT(S): 5000 INJECTION INTRAVENOUS; SUBCUTANEOUS at 06:10

## 2017-07-13 RX ADMIN — SENNA PLUS 2 TABLET(S): 8.6 TABLET ORAL at 23:36

## 2017-07-13 NOTE — PROGRESS NOTE ADULT - SUBJECTIVE AND OBJECTIVE BOX
Reason for Consult: Arrhythmia; Severe Mitral Valve Regurgitation   82 y.o. Gentleman - h.o. T2DM, Prostate CA, no known CV disease (including conduction disease) - p/w urinary incontinence with elective procedure for revision of AUS - and cardiology consulted for sinus bradycardia with 1' AVB (without prior EKGs); ultimately discovered to have asymptomatic severe MR (Flail A2 Scallop, torn chordae, moderate AI), no PHTN, normal LV cavity dimensions and then found to have CHB with stable junctional rhythm. No evidence of vegetations  ========================================== Overnight Events - Appreciate CTS & EP recs - Stable junctional escape; no CP/SOB/Palps/AMS - Remains reluctant to pursue cardiac interventions  ==========================================	        PAST MEDICAL & SURGICAL HISTORY:  Prostate CA  Diabetes mellitus  Urinary incontinence  H/O prostatectomy      PREVIOUS DIAGNOSTIC TESTING:: unknown    HOME MEDICATIONS :     MEDICATIONS  (STANDING):  MEDICATIONS  (STANDING):  aspirin enteric coated 81 milliGRAM(s) Oral daily  atorvastatin 10 milliGRAM(s) Oral at bedtime  hydrochlorothiazide 12.5 milliGRAM(s) Oral daily  docusate sodium 100 milliGRAM(s) Oral two times a day  sodium chloride 0.9% lock flush 3 milliLiter(s) IV Push every 8 hours  heparin  Injectable 5000 Unit(s) SubCutaneous every 8 hours  losartan 25 milliGRAM(s) Oral daily  senna 2 Tablet(s) Oral at bedtime  polyethylene glycol 3350 17 Gram(s) Oral daily  insulin lispro (HumaLOG) corrective regimen sliding scale   SubCutaneous three times a day before meals  insulin lispro (HumaLOG) corrective regimen sliding scale   SubCutaneous at bedtime  dextrose 5%. 1000 milliLiter(s) (50 mL/Hr) IV Continuous <Continuous>  dextrose 50% Injectable 12.5 Gram(s) IV Push once  dextrose 50% Injectable 25 Gram(s) IV Push once  dextrose 50% Injectable 25 Gram(s) IV Push once          MEDICATIONS  (PRN):      FAMILY HISTORY:  No pertinent family history in first degree relatives    Allergies    No Known Allergies    Intolerances      PHYSICAL EXAM  T(C): 36.4 (17 @ 04:37), Max: 36.5 (17 @ 22:14)  HR: 54 (17 @ 04:37) (54 - 56)  BP: 141/54 (17 @ 04:37) (114/64 - 141/54)  RR: 18 (17 @ 04:37) (18 - 18)  SpO2: 96% (17 @ 04:37) (96% - 99%)  Wt(kg): --  Daily     Daily Weight in k (2017 04:37)  I&O's Summary    2017 07:01  -  2017 07:00  --------------------------------------------------------  IN: 280 mL / OUT: 600 mL / NET: -320 mL          PHYSICAL EXAM:      Constitutional: NAD, alert    Eyes: No visual abnormalities    ENMT: No JVD    Respiratory: CTAB    Cardiovascular: Bradycardic; S1, S2, III HSM radiating to the back     Gastrointestinal: Soft NT ND     Genitourinary: Bladder device in     Extremities:no LE edema, no skin prupura    Vascular: Good distal pulses    Neurological: AOX3 no focal deficits    Skin: no bruising, no petechiae                                      13.9   7.3   )-----------( 188      ( 2017 07:06 )             40.1     -04    140  |  103  |  20  ----------------------------<  108<H>  3.7   |  27  |  1.06    Ca    9.0      2017 07:06    PT/INR - ( 2017 19:29 )   PT: 11.7 sec;   INR: 1.07 ratio         PTT - ( 2017 19:29 )  PTT:35.2 sec      < from: Transesophageal Echocardiogram w/o TTE (07.10.17 @ 09:20) >  Observations:  Mitral Valve: Mitral valve prolapse involving the anterior  mitral leaflet. The A2 scallop of the anterior leaflet is  partially flail. A linear mobile echodensity consistent  with torn chordae is seen attached to the tip of the A2  scallop. Severe, eccentric, posteriorly-directed mitral  regurgitation.  Aortic Valve/Aorta: Calcified trileaflet aortic valve with  normal opening. Moderate aortic regurgitation. Vena  contractaabout 0.4-0.5 cm.  Normal aortic root and ascending aorta. Simple atheroma  noted in the aortic arch and descending aorta.  Left Atrium: Left atrial enlargement. No left atrial or  left atrial appendage thrombus. Normal left atrial  appendage function (velocity>60 cm/s).  Left Ventricle: Normal left ventricular systolic function.  No segmental wall motion abnormalities.  Right Heart: Normal right atrium. Normal right ventricular  size and function. Normal tricuspid valve. Mild tricuspid  regurgitation. Normal pulmonic valve. Minimal pulmonic  regurgitation.  Pericardium/Pleura: Normal pericardium with no pericardial  effusion.  Hemodynamic: Estimated right atrial pressure is 8 mm Hg.  Estimated right ventricular systolic pressure equals 38 mm  Hg, assuming right atrial pressure equals 8 mm Hg,  consistent with borderline pulmonary hypertension. Agitated  saline injection and color flow Doppler demonstrate no  evidence of a patent foramen ovale.  ------------------------------------------------------------------------  Conclusions:  1. Mitral valve prolapse involving the anterior mitral  leaflet. The A2 scallop of the anterior leaflet is  partially flail. A linear mobile echodensity consistent  with torn chordae is seen attached to the tip of the A2  scallop. Severe, eccentric, posteriorly-directed mitral  regurgitation.  2. Calcified trileaflet aortic valve with normal opening.  Moderate aortic regurgitation. Vena contractaabout 0.4-0.5  cm.  3. Normal aortic root and ascending aorta. Simple atheroma  noted in the aortic arch and descending aorta.  4. Left atrial enlargement. No left atrial or left atrial  appendage thrombus. Normal left atrial appendage function  (velocity>60 cm/s).  5. Normal left ventricular systolic function. No segmental  wall motion abnormalities.  6. Normal right ventricular size and function.  7. Agitated saline injection and color flow Doppler  demonstrate no evidence ofa patent foramen ovale.  No definite vegetations seen. Partially flail A2 scallop of  the mitral valve. If clinical suspicion for endocarditis  remains high, consider repeat CARL in approximately 7-10  days.  ------------------------------------------------------------------------  Confirmed on  7/10/2017 - 12:21:31 by Carlitos Marie M.D.    < end of copied text >      < from: Transthoracic Echocardiogram (17 @ 21:18) >  Observations:  Mitral Valve: Bileaflet mitral valve prolapse (anterior >  posterior).There is focal thickening on the atrial side of  the anterior mitral valve. This is suspicous for vegetation  in the appropriate clinical setting.  At least moderate,  eccentric mitral regurgitation (may be underestimated)  Aortic Valve/Aorta: Calcified trileaflet aortic valve with  normal opening. Mild-moderate aortic regurgitation.  Upper limits of normal aortic root size for BSA.  (Ao: 3.8  cm at the sinuses of Valsalva).  Left Atrium: Normal left atrium.  LA volume index = 21  cc/m2.  Left Ventricle: Normal left ventricular systolic function.  No segmental wall motion abnormalities. Normal left  ventricular internal dimensions and wall thicknesses. Basal  septalhypertrophy present. Reversal of the E-A waves of  the mitral inflow pattern consistent with reduced  compliance of the left ventricle.  Right Heart: Normal right atrium. Normal right ventricular  size and function. Normal tricuspid valve. Mild tricuspid  regurgitation. Normal pulmonic valve. Mild pulmonic  regurgitation.  Pericardium/Pleura: No pericardial effusion seen.  Hemodynamic: Estimated right atrial pressure is 8 mm Hg.  Estimated right ventricular systolic pressure equals 31 mm  Hg, assuming right atrial pressure equals 8 mm Hg,  consistent with normal pulmonary pressures.  ------------------------------------------------------------------------  Conclusions:  1. 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 This is suspicous for vegetation  in the appropriate clinical setting.  At least moderate,  eccentric mitral regurgitation (may be underestimated)  2. Calcified trileaflet aortic valve withnormal opening.  Mild-moderate aortic regurgitation.  3. Normal left ventricular systolic function. No segmental  wall motion abnormalities.  4. Normal right ventricular size and function.  *** No previous Echo exam. Dr. Richter notified of  findings at 1130 AM on 17. CARL could better evaluate  the mitral and aortic valves if clinically indicated.  ------------------------------------------------------------------------  Confirmed on  2017 - 11:34:46 by Krunal Allen M.D.  ---------------------------------------------------------------------    < end of copied text >      Tele: Sinus 50 - 70s with intermittent Mobitz I/II with transient bradycardia

## 2017-07-13 NOTE — PROGRESS NOTE ADULT - ASSESSMENT
82 y.o. Gentleman - h.o. T2DM, Prostate CA, no known CV disease (including conduction disease) - p/w urinary incontinence with elective procedure for revision of AUS - and cardiology consulted for sinus bradycardia with 1' AVB (without prior EKGs); ultimately discovered to have asymptomatic severe MR (Flail A2 Scallop, torn chordae, moderate AI), no PHTN, normal LV cavity dimensions and then subsequently found to have CHB with stable junctional rhythm. No evidence of vegetations .  1. Sinus Bradycardia with episodes of CHB with stable junctional escape 2. Flail A2 Scallop with Posteriorly directed MR 3. AUS Revision   Recommendations: - May be chronic asymptomatic severe MR and unclear if meets definite indications for surgery given LV cavity dimensions; at this point, not willing to pursue further cardiac interventions - Appreciate Dr. Jones & CTS Recs - Tele; monitor closely; although meets definite indications for PPM, currently deferring decision - If AMS/low BP/CP, low threshold to move to unit - Address dispo plan if not willing to have further cardiac w/u

## 2017-07-13 NOTE — CHART NOTE - NSCHARTNOTEFT_GEN_A_CORE
Met with patient and his daughter at bedside to discuss patient's known bradycardia and heart block. Risks of bradycardia and heart block were explained to the patient and his daughter. Risks, benefits and alternatives to permanent pacemaker were explained to the patient and his daughter. We provided the recommendation that patient pursue placement of permanent pacemaker. Patient and his daughter expressed understanding and declined placement of permanent pacemaker, with acceptance of potential risks of not having the pacemaker.     At this point, we will discontinue telemetry and have physical therapy evaluate the patient for placement back at [facility].

## 2017-07-13 NOTE — PROGRESS NOTE ADULT - SUBJECTIVE AND OBJECTIVE BOX
Patient is a 82y old  Male who presents with a chief complaint of eroded artificial urethral sphincter, intermittent complete heart block (10 Jul 2017 17:43)      SUBJECTIVE / OVERNIGHT EVENTS: Pt denies CP, palpitations, dizziness or SOB.    MEDICATIONS  (STANDING):  aspirin enteric coated 81 milliGRAM(s) Oral daily  atorvastatin 10 milliGRAM(s) Oral at bedtime  hydrochlorothiazide 12.5 milliGRAM(s) Oral daily  docusate sodium 100 milliGRAM(s) Oral two times a day  sodium chloride 0.9% lock flush 3 milliLiter(s) IV Push every 8 hours  heparin  Injectable 5000 Unit(s) SubCutaneous every 8 hours  losartan 25 milliGRAM(s) Oral daily  senna 2 Tablet(s) Oral at bedtime  polyethylene glycol 3350 17 Gram(s) Oral daily  insulin lispro (HumaLOG) corrective regimen sliding scale   SubCutaneous three times a day before meals  insulin lispro (HumaLOG) corrective regimen sliding scale   SubCutaneous at bedtime  dextrose 5%. 1000 milliLiter(s) (50 mL/Hr) IV Continuous <Continuous>  dextrose 50% Injectable 12.5 Gram(s) IV Push once  dextrose 50% Injectable 25 Gram(s) IV Push once  dextrose 50% Injectable 25 Gram(s) IV Push once    MEDICATIONS  (PRN):  bisacodyl Suppository 10 milliGRAM(s) Rectal daily PRN Constipation  dextrose Gel 1 Dose(s) Oral once PRN Blood Glucose LESS THAN 70 milliGRAM(s)/deciliter  glucagon  Injectable 1 milliGRAM(s) IntraMuscular once PRN Glucose LESS THAN 70 milligrams/deciliter      Vital Signs Last 24 Hrs  T(C): 36.5 (07-13-17 @ 12:36), Max: 36.5 (07-12-17 @ 22:14)  HR: 56 (07-13-17 @ 12:36) (54 - 56)  BP: 108/51 (07-13-17 @ 12:36) (108/51 - 141/54)  RR: 18 (07-13-17 @ 12:36) (18 - 18)  SpO2: 100% (07-13-17 @ 12:36) (96% - 100%)  CAPILLARY BLOOD GLUCOSE  96 (13 Jul 2017 12:41)  106 (13 Jul 2017 09:01)  123 (12 Jul 2017 21:28)  97 (12 Jul 2017 18:11)        I&O's Summary    12 Jul 2017 07:01  -  13 Jul 2017 07:00  --------------------------------------------------------  IN: 280 mL / OUT: 600 mL / NET: -320 mL    13 Jul 2017 07:01  -  13 Jul 2017 16:38  --------------------------------------------------------  IN: 640 mL / OUT: 350 mL / NET: 290 mL        PHYSICAL EXAM:  GENERAL: NAD, well-developed  HEAD:  Atraumatic, Normocephalic  EYES: EOMI, PERRLA, conjunctiva and sclera clear  NECK: Supple, No JVD  CHEST/LUNG: Clear to auscultation bilaterally; No wheeze  HEART: Regular rate and rhythm; No murmurs, rubs, or gallops  ABDOMEN: Soft, Nontender, Nondistended; Bowel sounds present  EXTREMITIES:  2+ Peripheral Pulses, No clubbing, cyanosis, or edema  PSYCH: AAOx3  NEUROLOGY: non-focal  SKIN: No rashes or lesions    LABS:                        12.7   6.4   )-----------( 215      ( 13 Jul 2017 07:03 )             38.0     WBC Trend: 6.4<--, 6.8<--, 6.1<--  07-13    142  |  104  |  27<H>  ----------------------------<  103<H>  3.4<L>   |  25  |  1.03    Ca    9.7      13 Jul 2017 07:03      Creatinine Trend: 1.03<--, 0.97<--, 1.01<--, 0.89<--, 1.09<--, 1.11<--  PT/INR - ( 12 Jul 2017 06:50 )   PT: 12.1 sec;   INR: 1.11 ratio         PTT - ( 12 Jul 2017 06:50 )  PTT:52.0 sec Patient is a 82y old  Male who presents with a chief complaint of eroded artificial urethral sphincter, intermittent complete heart block (10 Jul 2017 17:43)      SUBJECTIVE / OVERNIGHT EVENTS: Pt denies CP, palpitations, dizziness or SOB.    MEDICATIONS  (STANDING):  aspirin enteric coated 81 milliGRAM(s) Oral daily  atorvastatin 10 milliGRAM(s) Oral at bedtime  hydrochlorothiazide 12.5 milliGRAM(s) Oral daily  docusate sodium 100 milliGRAM(s) Oral two times a day  sodium chloride 0.9% lock flush 3 milliLiter(s) IV Push every 8 hours  heparin  Injectable 5000 Unit(s) SubCutaneous every 8 hours  losartan 25 milliGRAM(s) Oral daily  senna 2 Tablet(s) Oral at bedtime  polyethylene glycol 3350 17 Gram(s) Oral daily  insulin lispro (HumaLOG) corrective regimen sliding scale   SubCutaneous three times a day before meals  insulin lispro (HumaLOG) corrective regimen sliding scale   SubCutaneous at bedtime  dextrose 5%. 1000 milliLiter(s) (50 mL/Hr) IV Continuous <Continuous>  dextrose 50% Injectable 12.5 Gram(s) IV Push once  dextrose 50% Injectable 25 Gram(s) IV Push once  dextrose 50% Injectable 25 Gram(s) IV Push once    MEDICATIONS  (PRN):  bisacodyl Suppository 10 milliGRAM(s) Rectal daily PRN Constipation  dextrose Gel 1 Dose(s) Oral once PRN Blood Glucose LESS THAN 70 milliGRAM(s)/deciliter  glucagon  Injectable 1 milliGRAM(s) IntraMuscular once PRN Glucose LESS THAN 70 milligrams/deciliter      Vital Signs Last 24 Hrs  T(C): 36.5 (07-13-17 @ 12:36), Max: 36.5 (07-12-17 @ 22:14)  HR: 56 (07-13-17 @ 12:36) (54 - 56)  BP: 108/51 (07-13-17 @ 12:36) (108/51 - 141/54)  RR: 18 (07-13-17 @ 12:36) (18 - 18)  SpO2: 100% (07-13-17 @ 12:36) (96% - 100%)  CAPILLARY BLOOD GLUCOSE  96 (13 Jul 2017 12:41)  106 (13 Jul 2017 09:01)  123 (12 Jul 2017 21:28)  97 (12 Jul 2017 18:11)        I&O's Summary    12 Jul 2017 07:01  -  13 Jul 2017 07:00  --------------------------------------------------------  IN: 280 mL / OUT: 600 mL / NET: -320 mL    13 Jul 2017 07:01  -  13 Jul 2017 16:38  --------------------------------------------------------  IN: 640 mL / OUT: 350 mL / NET: 290 mL        PHYSICAL EXAM:  GENERAL: NAD, well-developed  HEAD:  Atraumatic, Normocephalic  EYES: EOMI, PERRLA, conjunctiva and sclera clear  NECK: Supple, No JVD  CHEST/LUNG: Clear to auscultation bilaterally; No wheeze  HEART: Regular rate and rhythm; No murmurs, rubs, or gallops  ABDOMEN: Soft, Nontender, Nondistended; Bowel sounds present  EXTREMITIES:  2+ Peripheral Pulses, No clubbing, cyanosis, or edema  PSYCH: AAOx3  NEUROLOGY: non-focal  SKIN: No rashes or lesions    LABS:                        12.7   6.4   )-----------( 215      ( 13 Jul 2017 07:03 )             38.0     WBC Trend: 6.4<--, 6.8<--, 6.1<--  07-13    142  |  104  |  27<H>  ----------------------------<  103<H>  3.4<L>   |  25  |  1.03    Ca    9.7      13 Jul 2017 07:03      Creatinine Trend: 1.03<--, 0.97<--, 1.01<--, 0.89<--, 1.09<--, 1.11<--  PT/INR - ( 12 Jul 2017 06:50 )   PT: 12.1 sec;   INR: 1.11 ratio         PTT - ( 12 Jul 2017 06:50 )  PTT:52.0 sec    case discussed with: GABRIEL

## 2017-07-13 NOTE — PROGRESS NOTE ADULT - ASSESSMENT
82M with prostate cancer, s/p prostatectomy 1990s, s/p artificial urinary sphincter, HTN, HLD, DM type 2, s/p removal of an eroded artificial urinary sphincter. His hospitalization was complicated by a with finding of mitral valve vegetation on TTE, which was not observed on subsequent CARL. Pt found to have alternating 1st degree AVB and 3rd degree AVB (AV dissociation, possibly junctional rhythm) and was being evaluated for potential ppm placement.

## 2017-07-13 NOTE — PROGRESS NOTE ADULT - PROBLEM SELECTOR PLAN 1
- had family meeting with patient and patient's daughter today  - after discussion of risks/benefits and alternatives of ppm and risks of irregular rhythm, pt declined ppm  - will hence d/c telemetry and begin discharge planning to [facility]

## 2017-07-14 VITALS
DIASTOLIC BLOOD PRESSURE: 70 MMHG | RESPIRATION RATE: 18 BRPM | TEMPERATURE: 97 F | OXYGEN SATURATION: 99 % | SYSTOLIC BLOOD PRESSURE: 165 MMHG | HEART RATE: 54 BPM

## 2017-07-14 PROBLEM — Z00.00 ENCOUNTER FOR PREVENTIVE HEALTH EXAMINATION: Noted: 2017-07-14

## 2017-07-14 LAB
ANION GAP SERPL CALC-SCNC: 12 MMOL/L — SIGNIFICANT CHANGE UP (ref 5–17)
BUN SERPL-MCNC: 28 MG/DL — HIGH (ref 7–23)
CALCIUM SERPL-MCNC: 9.5 MG/DL — SIGNIFICANT CHANGE UP (ref 8.4–10.5)
CHLORIDE SERPL-SCNC: 105 MMOL/L — SIGNIFICANT CHANGE UP (ref 96–108)
CO2 SERPL-SCNC: 25 MMOL/L — SIGNIFICANT CHANGE UP (ref 22–31)
CREAT SERPL-MCNC: 1.18 MG/DL — SIGNIFICANT CHANGE UP (ref 0.5–1.3)
CULTURE RESULTS: SIGNIFICANT CHANGE UP
CULTURE RESULTS: SIGNIFICANT CHANGE UP
GLUCOSE SERPL-MCNC: 100 MG/DL — HIGH (ref 70–99)
POTASSIUM SERPL-MCNC: 3.7 MMOL/L — SIGNIFICANT CHANGE UP (ref 3.5–5.3)
POTASSIUM SERPL-SCNC: 3.7 MMOL/L — SIGNIFICANT CHANGE UP (ref 3.5–5.3)
SODIUM SERPL-SCNC: 142 MMOL/L — SIGNIFICANT CHANGE UP (ref 135–145)
SPECIMEN SOURCE: SIGNIFICANT CHANGE UP
SPECIMEN SOURCE: SIGNIFICANT CHANGE UP

## 2017-07-14 PROCEDURE — 93306 TTE W/DOPPLER COMPLETE: CPT

## 2017-07-14 PROCEDURE — 85610 PROTHROMBIN TIME: CPT

## 2017-07-14 PROCEDURE — 85652 RBC SED RATE AUTOMATED: CPT

## 2017-07-14 PROCEDURE — 99233 SBSQ HOSP IP/OBS HIGH 50: CPT | Mod: GC

## 2017-07-14 PROCEDURE — 84443 ASSAY THYROID STIM HORMONE: CPT

## 2017-07-14 PROCEDURE — 86901 BLOOD TYPING SEROLOGIC RH(D): CPT

## 2017-07-14 PROCEDURE — 83735 ASSAY OF MAGNESIUM: CPT

## 2017-07-14 PROCEDURE — 80048 BASIC METABOLIC PNL TOTAL CA: CPT

## 2017-07-14 PROCEDURE — 83036 HEMOGLOBIN GLYCOSYLATED A1C: CPT

## 2017-07-14 PROCEDURE — 86140 C-REACTIVE PROTEIN: CPT

## 2017-07-14 PROCEDURE — 93312 ECHO TRANSESOPHAGEAL: CPT

## 2017-07-14 PROCEDURE — 93325 DOPPLER ECHO COLOR FLOW MAPG: CPT

## 2017-07-14 PROCEDURE — 86850 RBC ANTIBODY SCREEN: CPT

## 2017-07-14 PROCEDURE — 85027 COMPLETE CBC AUTOMATED: CPT

## 2017-07-14 PROCEDURE — 71045 X-RAY EXAM CHEST 1 VIEW: CPT

## 2017-07-14 PROCEDURE — 97530 THERAPEUTIC ACTIVITIES: CPT

## 2017-07-14 PROCEDURE — 86900 BLOOD TYPING SEROLOGIC ABO: CPT

## 2017-07-14 PROCEDURE — 87086 URINE CULTURE/COLONY COUNT: CPT

## 2017-07-14 PROCEDURE — 84100 ASSAY OF PHOSPHORUS: CPT

## 2017-07-14 PROCEDURE — 87040 BLOOD CULTURE FOR BACTERIA: CPT

## 2017-07-14 PROCEDURE — 93320 DOPPLER ECHO COMPLETE: CPT

## 2017-07-14 PROCEDURE — 93005 ELECTROCARDIOGRAM TRACING: CPT

## 2017-07-14 PROCEDURE — C1769: CPT

## 2017-07-14 PROCEDURE — 88300 SURGICAL PATH GROSS: CPT

## 2017-07-14 PROCEDURE — 97116 GAIT TRAINING THERAPY: CPT

## 2017-07-14 PROCEDURE — 80202 ASSAY OF VANCOMYCIN: CPT

## 2017-07-14 PROCEDURE — 85730 THROMBOPLASTIN TIME PARTIAL: CPT

## 2017-07-14 PROCEDURE — 97162 PT EVAL MOD COMPLEX 30 MIN: CPT

## 2017-07-14 RX ORDER — LOSARTAN POTASSIUM 100 MG/1
1 TABLET, FILM COATED ORAL
Qty: 0 | Refills: 0 | COMMUNITY
Start: 2017-07-14

## 2017-07-14 RX ORDER — SENNA PLUS 8.6 MG/1
2 TABLET ORAL
Qty: 0 | Refills: 0 | COMMUNITY
Start: 2017-07-14

## 2017-07-14 RX ADMIN — Medication 100 MILLIGRAM(S): at 07:11

## 2017-07-14 RX ADMIN — LOSARTAN POTASSIUM 25 MILLIGRAM(S): 100 TABLET, FILM COATED ORAL at 07:11

## 2017-07-14 RX ADMIN — SODIUM CHLORIDE 3 MILLILITER(S): 9 INJECTION INTRAMUSCULAR; INTRAVENOUS; SUBCUTANEOUS at 07:11

## 2017-07-14 RX ADMIN — HEPARIN SODIUM 5000 UNIT(S): 5000 INJECTION INTRAVENOUS; SUBCUTANEOUS at 07:11

## 2017-07-14 RX ADMIN — Medication 81 MILLIGRAM(S): at 11:31

## 2017-07-14 NOTE — PROGRESS NOTE ADULT - PROBLEM SELECTOR PROBLEM 7
Preventive measure

## 2017-07-14 NOTE — PROGRESS NOTE ADULT - PROBLEM SELECTOR PLAN 5
-c/w atorvastatin  -c/w [id number]

## 2017-07-14 NOTE — PROGRESS NOTE ADULT - PROBLEM SELECTOR PROBLEM 6
Erosion of urethra

## 2017-07-14 NOTE — PROGRESS NOTE ADULT - PROBLEM SELECTOR PLAN 1
-monitor on telemetry  -pt currently refusing surgery, understands and is able to verbalize risks and benefits

## 2017-07-14 NOTE — PROGRESS NOTE ADULT - PROBLEM SELECTOR PLAN 8
-HERNAN as per PT- likely [facility]
-f/u PT recs for dispo planning - will likely be DC'd to [facility]
-f/u PT recs for dispo planning - will likely be DC'd to [facility]

## 2017-07-14 NOTE — PROGRESS NOTE ADULT - PROBLEM SELECTOR PLAN 3
-CARL tomorrow  -f/u cardiology
-FS controlled  -c/w FS checks, HISS
f/u cardiology
-FS controlled  -c/w FS checks, HISS

## 2017-07-14 NOTE — PROGRESS NOTE ADULT - SUBJECTIVE AND OBJECTIVE BOX
24 HOUR EVENTS/ROS: No acute events overnight. Denies headaches, F/C, dizziness, syncope, CP/SOB, N/V, abdominal pain, dysuria, changes in BM, peripheral swelling, skin changes, new joint aches. Tolerating PO, ambulatory.     MEDICATIONS:  aspirin enteric coated 81 milliGRAM(s) Oral daily  hydrochlorothiazide 12.5 milliGRAM(s) Oral daily  heparin  Injectable 5000 Unit(s) SubCutaneous every 8 hours  losartan 25 milliGRAM(s) Oral daily  docusate sodium 100 milliGRAM(s) Oral two times a day  senna 2 Tablet(s) Oral at bedtime  polyethylene glycol 3350 17 Gram(s) Oral daily  bisacodyl Suppository 10 milliGRAM(s) Rectal daily PRN  atorvastatin 10 milliGRAM(s) Oral at bedtime  insulin lispro (HumaLOG) corrective regimen sliding scale   SubCutaneous three times a day before meals  insulin lispro (HumaLOG) corrective regimen sliding scale   SubCutaneous at bedtime  dextrose Gel 1 Dose(s) Oral once PRN  dextrose 50% Injectable 12.5 Gram(s) IV Push once  dextrose 50% Injectable 25 Gram(s) IV Push once  dextrose 50% Injectable 25 Gram(s) IV Push once  glucagon  Injectable 1 milliGRAM(s) IntraMuscular once PRN  dextrose 5%. 1000 milliLiter(s) IV Continuous <Continuous>    PHYSICAL EXAM:  T(C): 36.5 (17 @ 04:15), Max: 36.9 (17 @ 21:02)  HR: 51 (17 @ 04:15) (51 - 56)  BP: 120/69 (17 @ 04:15) (108/51 - 120/69)  RR: 18 (17 @ 04:15) (18 - 18)  SpO2: 98% (17 @ 04:15) (98% - 100%)  Wt(kg): --  Daily     Daily Weight in k (2017 04:15)  I&O's Summary    2017 07:01  -  2017 07:00  --------------------------------------------------------  IN: 1020 mL / OUT: 650 mL / NET: 370 mL    Appearance: elderly  male in NAD	  HEENT:   Normal oral mucosa, PERRL, EOMI	  Lymphatic: No lymphadenopathy  Cardiovascular: slow rate, no JVD, no murmurs appreciated  Respiratory: Lungs clear to auscultation	  Psychiatry: A & O x 3, Mood & affect appropriate  Gastrointestinal:  Soft, Non-tender, + BS  Genitourinary: catheter from ventral penis draining clear yellow fluid, no blood/clots around penile meatus  Skin: No rashes, No ecchymoses, No cyanosis	  Neurologic: Non-focal  MSK/Extremities: Normal range of motion, No clubbing, cyanosis or edema  Vascular: Peripheral pulses palpable 2+ bilaterally    LABS:	 	                        12.7   6.4   )-----------( 215      ( 2017 07:03 )             38.0         142  |  105  |  28<H>  ----------------------------<  100<H>  3.7   |  25  |  1.18      142  |  104  |  27<H>  ----------------------------<  103<H>  3.4<L>   |  25  |  1.03    Ca    9.5      2017 06:32  Ca    9.7      2017 07:03     FS: CAPILLARY BLOOD GLUCOSE  109 (2017 21:02)  110 (2017 17:41)  96 (2017 12:41)  106 (2017 09:01)      Consult notes reviewed:  -PT, EP, CT surgery

## 2017-07-14 NOTE — PROGRESS NOTE ADULT - PROBLEM SELECTOR PLAN 6
-currently site C/D/I, voiding well  -f/u uro as outpt
-currently site C/D/I, voiding well  -f/u uro recs

## 2017-07-14 NOTE — PROGRESS NOTE ADULT - PROVIDER SPECIALTY LIST ADULT
Cardiology
Electrophysiology
Infectious Disease
Infectious Disease
Internal Medicine
Urology
Electrophysiology

## 2017-07-14 NOTE — PROGRESS NOTE ADULT - ATTENDING COMMENTS
Debbi Zuniga  Pager: [phone]. If no response or past 5 pm call [phone].    Will sign off, please call with questions.
Debbi Zuniga  Pager: [phone]. If no response or past 5 pm call [phone].
Agree w above.  Given pt medical issues are primarily Cardiac, will consider transfer to Medicine/Cards service.  Will also check urine culture.  Will cont to follow
Pt seen/examined.  Case discussed with housestaff/PA team.  Agree with above note history, physical and assessment/plan.
LATE ENTRY: Pt seen/examined.  Case discussed with housestaff/PA team.  Agree with above note history, physical and assessment/plan.
1) severe MR with flail anterior leaflet - asymptomatic, normal EF. no signs of vegetations. will discontinue IV antibiotics for endocarditis as suspicion is low at this time. Pending CT surgery eval re: future mitral valve surgery (pt with LA enlargement)  2) complete heart block- s/p EP eval. declining PPM at this time. will discuss further with family. tele monitor with 1st degree AV block 50-70s  3) AUS sphincter removal- no further urological workup at this time  4) dvt ppx hsq  5) dispo pending PT eval, family and patient decision to not pursue PPM
CARL today to determine need to continue abx for endocarditis.  Discuss PPM further with EP for complete heart block  PT consult pending
Patient seen and examined.  Agree with resident note as above.  Meds, labs and vitals all reviewed.  Patient with a history of artificial urinary sphincter, now s/p removal, concern over endocarditis.   Patient on broad spectrum antibiotics, looks clinically well.   Patient to have CARL Monday and may need CCU for temporary pacing, given recent complete heart block.   Patient being monitored on telemetry for now, with close follow up by ID and EP.
Patient seen and examined.  Agree with resident note.  Meds, labs and vitals reviewed.  Patient with eroded AUS, on broad spectrum antibiotics, suspicion for endocarditis, also with intermittent heart block.  CLOSE monitoring on telemetry. Patient being followed by EP. If any decompensation, may need urgent transfer to CCU for transvenous pacing.   Continue with broad spectrum antibiotics.  Likely CARL Monday.  AV Deloris meds on hold.
1) severe MR with flail anterior leaflet - asymptomatic, normal EF, enlarged LA. CT surgery consult appreciated- would like to offer mitral valve clipping/repair- s/p family meeting today- patient currently not agreeable.  2) complete heart block- s/p EP eval. s/p fam meeting- pt not agreeable, daughter supports this decision. able to express risks including that of fatal arrythmia, sudden cardiac death  3) AUS sphincter removal- no further urological workup at this time, d/c with monte  4) dvt ppx hsq  5) dispo pending PT eval, dc planning in place, likely back to [place]
medically stable for discharge back to [facility]  d/c time 45 mins
Pt seen/examined.  Case discussed with housestaff/PA team.  Agree with above note history, physical and assessment/plan.
1) severe MR with flail anterior leaflet - asymptomatic, normal EF, enlarged LA. CT surgery consult appreciated- would like to offer mitral valve clipping/repair- pt originially refusing all aggressive treatment, now is willing to consider. plan to have family meeting tomorrow at 2pm with daughter at bedside to discuss   2) complete heart block- s/p EP eval. family meeting tomorrow 2pm to discuss PPM  3) AUS sphincter removal- no further urological workup at this time  4) dvt ppx hsq  5) dispo pending PT eval, family and patient decision to pursue or not pursue PPM and mitral valve clipping

## 2017-07-14 NOTE — PROGRESS NOTE ADULT - PROBLEM SELECTOR PROBLEM 5
Hyperlipidemia, unspecified hyperlipidemia type

## 2017-07-19 ENCOUNTER — APPOINTMENT (OUTPATIENT)
Dept: ELECTROPHYSIOLOGY | Facility: CLINIC | Age: 82
End: 2017-07-19

## 2017-07-19 RX ORDER — LOSARTAN POTASSIUM 100 MG/1
1 TABLET, FILM COATED ORAL
Qty: 0 | Refills: 0 | COMMUNITY

## 2017-07-20 RX ORDER — ATORVASTATIN CALCIUM 80 MG/1
1 TABLET, FILM COATED ORAL
Qty: 0 | Refills: 0 | COMMUNITY

## 2017-08-11 ENCOUNTER — APPOINTMENT (OUTPATIENT)
Dept: UROLOGY | Facility: CLINIC | Age: 82
End: 2017-08-11
Payer: MEDICARE

## 2017-08-11 VITALS
HEIGHT: 66 IN | HEART RATE: 58 BPM | BODY MASS INDEX: 20.25 KG/M2 | WEIGHT: 126 LBS | DIASTOLIC BLOOD PRESSURE: 60 MMHG | SYSTOLIC BLOOD PRESSURE: 118 MMHG

## 2017-08-11 PROBLEM — E11.9 TYPE 2 DIABETES MELLITUS WITHOUT COMPLICATIONS: Chronic | Status: ACTIVE | Noted: 2017-01-30

## 2017-08-11 PROCEDURE — 99024 POSTOP FOLLOW-UP VISIT: CPT

## 2017-08-12 ENCOUNTER — TRANSCRIPTION ENCOUNTER (OUTPATIENT)
Age: 82
End: 2017-08-12

## 2017-10-27 ENCOUNTER — APPOINTMENT (OUTPATIENT)
Dept: UROLOGY | Facility: CLINIC | Age: 82
End: 2017-10-27
Payer: MEDICARE

## 2017-10-27 ENCOUNTER — OUTPATIENT (OUTPATIENT)
Dept: OUTPATIENT SERVICES | Facility: HOSPITAL | Age: 82
LOS: 1 days | End: 2017-10-27
Payer: MEDICARE

## 2017-10-27 VITALS
TEMPERATURE: 98 F | HEART RATE: 61 BPM | RESPIRATION RATE: 16 BRPM | DIASTOLIC BLOOD PRESSURE: 70 MMHG | SYSTOLIC BLOOD PRESSURE: 163 MMHG

## 2017-10-27 DIAGNOSIS — R35.0 FREQUENCY OF MICTURITION: ICD-10-CM

## 2017-10-27 DIAGNOSIS — Z90.79 ACQUIRED ABSENCE OF OTHER GENITAL ORGAN(S): Chronic | ICD-10-CM

## 2017-10-27 PROCEDURE — 52000 CYSTOURETHROSCOPY: CPT

## 2017-10-30 DIAGNOSIS — T83.111D: ICD-10-CM

## 2017-11-27 ENCOUNTER — OUTPATIENT (OUTPATIENT)
Dept: OUTPATIENT SERVICES | Facility: HOSPITAL | Age: 82
LOS: 1 days | End: 2017-11-27
Payer: MEDICARE

## 2017-11-27 VITALS
RESPIRATION RATE: 20 BRPM | OXYGEN SATURATION: 97 % | DIASTOLIC BLOOD PRESSURE: 67 MMHG | SYSTOLIC BLOOD PRESSURE: 154 MMHG | HEART RATE: 59 BPM | HEIGHT: 65 IN | WEIGHT: 128.09 LBS | TEMPERATURE: 98 F

## 2017-11-27 DIAGNOSIS — Z01.818 ENCOUNTER FOR OTHER PREPROCEDURAL EXAMINATION: ICD-10-CM

## 2017-11-27 DIAGNOSIS — Z90.79 ACQUIRED ABSENCE OF OTHER GENITAL ORGAN(S): Chronic | ICD-10-CM

## 2017-11-27 DIAGNOSIS — T83.111A BREAKDOWN (MECHANICAL) OF IMPLANTED URINARY SPHINCTER, INITIAL ENCOUNTER: ICD-10-CM

## 2017-11-27 DIAGNOSIS — Z98.890 OTHER SPECIFIED POSTPROCEDURAL STATES: Chronic | ICD-10-CM

## 2017-11-27 DIAGNOSIS — T83.110A: ICD-10-CM

## 2017-11-27 LAB
ANION GAP SERPL CALC-SCNC: 16 MMOL/L — SIGNIFICANT CHANGE UP (ref 5–17)
BUN SERPL-MCNC: 29 MG/DL — HIGH (ref 7–23)
CALCIUM SERPL-MCNC: 10.7 MG/DL — HIGH (ref 8.4–10.5)
CHLORIDE SERPL-SCNC: 97 MMOL/L — SIGNIFICANT CHANGE UP (ref 96–108)
CO2 SERPL-SCNC: 26 MMOL/L — SIGNIFICANT CHANGE UP (ref 22–31)
CREAT SERPL-MCNC: 1.13 MG/DL — SIGNIFICANT CHANGE UP (ref 0.5–1.3)
GLUCOSE SERPL-MCNC: 121 MG/DL — HIGH (ref 70–99)
HCT VFR BLD CALC: 43.9 % — SIGNIFICANT CHANGE UP (ref 39–50)
HGB BLD-MCNC: 15.1 G/DL — SIGNIFICANT CHANGE UP (ref 13–17)
MCHC RBC-ENTMCNC: 31.2 PG — SIGNIFICANT CHANGE UP (ref 27–34)
MCHC RBC-ENTMCNC: 34.4 GM/DL — SIGNIFICANT CHANGE UP (ref 32–36)
MCV RBC AUTO: 90.7 FL — SIGNIFICANT CHANGE UP (ref 80–100)
PLATELET # BLD AUTO: 179 K/UL — SIGNIFICANT CHANGE UP (ref 150–400)
POTASSIUM SERPL-MCNC: 3.9 MMOL/L — SIGNIFICANT CHANGE UP (ref 3.5–5.3)
POTASSIUM SERPL-SCNC: 3.9 MMOL/L — SIGNIFICANT CHANGE UP (ref 3.5–5.3)
RBC # BLD: 4.84 M/UL — SIGNIFICANT CHANGE UP (ref 4.2–5.8)
RBC # FLD: 13 % — SIGNIFICANT CHANGE UP (ref 10.3–14.5)
SODIUM SERPL-SCNC: 139 MMOL/L — SIGNIFICANT CHANGE UP (ref 135–145)
WBC # BLD: 9.24 K/UL — SIGNIFICANT CHANGE UP (ref 3.8–10.5)
WBC # FLD AUTO: 9.24 K/UL — SIGNIFICANT CHANGE UP (ref 3.8–10.5)

## 2017-11-27 PROCEDURE — G0463: CPT

## 2017-11-27 PROCEDURE — 87186 SC STD MICRODIL/AGAR DIL: CPT

## 2017-11-27 PROCEDURE — 80048 BASIC METABOLIC PNL TOTAL CA: CPT

## 2017-11-27 PROCEDURE — 85027 COMPLETE CBC AUTOMATED: CPT

## 2017-11-27 PROCEDURE — 87086 URINE CULTURE/COLONY COUNT: CPT

## 2017-11-27 RX ORDER — PIOGLITAZONE HYDROCHLORIDE 15 MG/1
1 TABLET ORAL
Qty: 0 | Refills: 0 | COMMUNITY

## 2017-11-27 RX ORDER — ASPIRIN/CALCIUM CARB/MAGNESIUM 324 MG
1 TABLET ORAL
Qty: 0 | Refills: 0 | COMMUNITY

## 2017-11-27 RX ORDER — ATORVASTATIN CALCIUM 80 MG/1
1 TABLET, FILM COATED ORAL
Qty: 0 | Refills: 0 | COMMUNITY

## 2017-11-27 RX ORDER — DOCUSATE SODIUM 100 MG
1 CAPSULE ORAL
Qty: 0 | Refills: 0 | COMMUNITY

## 2017-11-27 RX ORDER — MULTIVIT-MIN/FERROUS GLUCONATE 9 MG/15 ML
1 LIQUID (ML) ORAL
Qty: 0 | Refills: 0 | COMMUNITY

## 2017-11-27 NOTE — H&P PST ADULT - PMH
Dementia    Diabetes mellitus    DM (diabetes mellitus)    Hyperlipidemia    Hypertension    Prostate CA    Urinary incontinence Dementia    Diabetes mellitus  no meds needed now, recent wt loss  DM (diabetes mellitus)    Hyperlipidemia    Hypertension    Prostate CA    Urinary incontinence

## 2017-11-27 NOTE — H&P PST ADULT - PROBLEM SELECTOR PLAN 1
cystoscopy insertion of artifical urinary sphincter  continue Aspirin cystoscopy insertion of artifical urinary sphincter  continue Aspirin  echo and EKG on chart

## 2017-11-27 NOTE — H&P PST ADULT - HISTORY OF PRESENT ILLNESS
81 yo male with history of prostate cancer and subsequent incontinence, artificial urinary sphincter was inserted and then became necrotic, removed in 7/17.  Now for insertion again.

## 2017-11-29 LAB
-  AMIKACIN: SIGNIFICANT CHANGE UP
-  AMPICILLIN/SULBACTAM: SIGNIFICANT CHANGE UP
-  AMPICILLIN: SIGNIFICANT CHANGE UP
-  AZTREONAM: SIGNIFICANT CHANGE UP
-  CEFAZOLIN: SIGNIFICANT CHANGE UP
-  CEFEPIME: SIGNIFICANT CHANGE UP
-  CEFOXITIN: SIGNIFICANT CHANGE UP
-  CEFTAZIDIME: SIGNIFICANT CHANGE UP
-  CEFTRIAXONE: SIGNIFICANT CHANGE UP
-  CIPROFLOXACIN: SIGNIFICANT CHANGE UP
-  ERTAPENEM: SIGNIFICANT CHANGE UP
-  GENTAMICIN: SIGNIFICANT CHANGE UP
-  LEVOFLOXACIN: SIGNIFICANT CHANGE UP
-  MEROPENEM: SIGNIFICANT CHANGE UP
-  NITROFURANTOIN: SIGNIFICANT CHANGE UP
-  PIPERACILLIN/TAZOBACTAM: SIGNIFICANT CHANGE UP
-  TOBRAMYCIN: SIGNIFICANT CHANGE UP
-  TRIMETHOPRIM/SULFAMETHOXAZOLE: SIGNIFICANT CHANGE UP
CULTURE RESULTS: SIGNIFICANT CHANGE UP
METHOD TYPE: SIGNIFICANT CHANGE UP
ORGANISM # SPEC MICROSCOPIC CNT: SIGNIFICANT CHANGE UP
ORGANISM # SPEC MICROSCOPIC CNT: SIGNIFICANT CHANGE UP
SPECIMEN SOURCE: SIGNIFICANT CHANGE UP

## 2017-11-29 RX ORDER — SODIUM CHLORIDE 9 MG/ML
3 INJECTION INTRAMUSCULAR; INTRAVENOUS; SUBCUTANEOUS EVERY 8 HOURS
Qty: 0 | Refills: 0 | Status: DISCONTINUED | OUTPATIENT
Start: 2017-12-06 | End: 2017-12-06

## 2017-11-29 RX ORDER — CEFAZOLIN SODIUM 1 G
2000 VIAL (EA) INJECTION ONCE
Qty: 0 | Refills: 0 | Status: DISCONTINUED | OUTPATIENT
Start: 2017-12-06 | End: 2017-12-06

## 2017-12-06 ENCOUNTER — APPOINTMENT (OUTPATIENT)
Dept: UROLOGY | Facility: HOSPITAL | Age: 82
End: 2017-12-06

## 2017-12-06 ENCOUNTER — TRANSCRIPTION ENCOUNTER (OUTPATIENT)
Age: 82
End: 2017-12-06

## 2017-12-06 ENCOUNTER — OUTPATIENT (OUTPATIENT)
Dept: INPATIENT UNIT | Facility: HOSPITAL | Age: 82
LOS: 1 days | End: 2017-12-06
Payer: MEDICARE

## 2017-12-06 VITALS
HEIGHT: 66 IN | WEIGHT: 130.07 LBS | SYSTOLIC BLOOD PRESSURE: 158 MMHG | HEART RATE: 52 BPM | DIASTOLIC BLOOD PRESSURE: 68 MMHG | OXYGEN SATURATION: 99 % | TEMPERATURE: 98 F | RESPIRATION RATE: 18 BRPM

## 2017-12-06 DIAGNOSIS — Z98.890 OTHER SPECIFIED POSTPROCEDURAL STATES: Chronic | ICD-10-CM

## 2017-12-06 DIAGNOSIS — T83.111A BREAKDOWN (MECHANICAL) OF IMPLANTED URINARY SPHINCTER, INITIAL ENCOUNTER: ICD-10-CM

## 2017-12-06 DIAGNOSIS — Z90.79 ACQUIRED ABSENCE OF OTHER GENITAL ORGAN(S): Chronic | ICD-10-CM

## 2017-12-06 LAB — GLUCOSE BLDC GLUCOMTR-MCNC: 117 MG/DL — HIGH (ref 70–99)

## 2017-12-06 PROCEDURE — 93010 ELECTROCARDIOGRAM REPORT: CPT

## 2017-12-06 RX ORDER — HYDROCHLOROTHIAZIDE 25 MG
12.5 TABLET ORAL DAILY
Qty: 0 | Refills: 0 | Status: DISCONTINUED | OUTPATIENT
Start: 2017-12-06 | End: 2017-12-07

## 2017-12-06 RX ORDER — HEPARIN SODIUM 5000 [USP'U]/ML
5000 INJECTION INTRAVENOUS; SUBCUTANEOUS EVERY 8 HOURS
Qty: 0 | Refills: 0 | Status: DISCONTINUED | OUTPATIENT
Start: 2017-12-06 | End: 2017-12-07

## 2017-12-06 RX ORDER — ATORVASTATIN CALCIUM 80 MG/1
1 TABLET, FILM COATED ORAL
Qty: 0 | Refills: 0 | COMMUNITY

## 2017-12-06 RX ORDER — GENTAMICIN SULFATE 40 MG/ML
100 VIAL (ML) INJECTION EVERY 8 HOURS
Qty: 0 | Refills: 0 | Status: DISCONTINUED | OUTPATIENT
Start: 2017-12-06 | End: 2017-12-06

## 2017-12-06 RX ORDER — OXYCODONE AND ACETAMINOPHEN 5; 325 MG/1; MG/1
2 TABLET ORAL EVERY 4 HOURS
Qty: 0 | Refills: 0 | Status: DISCONTINUED | OUTPATIENT
Start: 2017-12-06 | End: 2017-12-07

## 2017-12-06 RX ORDER — MORPHINE SULFATE 50 MG/1
4 CAPSULE, EXTENDED RELEASE ORAL EVERY 4 HOURS
Qty: 0 | Refills: 0 | Status: DISCONTINUED | OUTPATIENT
Start: 2017-12-06 | End: 2017-12-07

## 2017-12-06 RX ORDER — DOCUSATE SODIUM 100 MG
3 CAPSULE ORAL
Qty: 0 | Refills: 0 | COMMUNITY

## 2017-12-06 RX ORDER — SODIUM CHLORIDE 9 MG/ML
1000 INJECTION, SOLUTION INTRAVENOUS
Qty: 0 | Refills: 0 | Status: DISCONTINUED | OUTPATIENT
Start: 2017-12-06 | End: 2017-12-07

## 2017-12-06 RX ORDER — LOSARTAN POTASSIUM 100 MG/1
25 TABLET, FILM COATED ORAL DAILY
Qty: 0 | Refills: 0 | Status: DISCONTINUED | OUTPATIENT
Start: 2017-12-06 | End: 2017-12-07

## 2017-12-06 RX ORDER — ONDANSETRON 8 MG/1
4 TABLET, FILM COATED ORAL EVERY 6 HOURS
Qty: 0 | Refills: 0 | Status: DISCONTINUED | OUTPATIENT
Start: 2017-12-06 | End: 2017-12-07

## 2017-12-06 RX ORDER — ASPIRIN/CALCIUM CARB/MAGNESIUM 324 MG
81 TABLET ORAL DAILY
Qty: 0 | Refills: 0 | Status: DISCONTINUED | OUTPATIENT
Start: 2017-12-06 | End: 2017-12-07

## 2017-12-06 RX ORDER — OXYCODONE AND ACETAMINOPHEN 5; 325 MG/1; MG/1
1 TABLET ORAL EVERY 4 HOURS
Qty: 0 | Refills: 0 | Status: DISCONTINUED | OUTPATIENT
Start: 2017-12-06 | End: 2017-12-07

## 2017-12-06 RX ORDER — ASPIRIN/CALCIUM CARB/MAGNESIUM 324 MG
1 TABLET ORAL
Qty: 0 | Refills: 0 | COMMUNITY

## 2017-12-06 RX ORDER — VANCOMYCIN HCL 1 G
1000 VIAL (EA) INTRAVENOUS ONCE
Qty: 0 | Refills: 0 | Status: DISCONTINUED | OUTPATIENT
Start: 2017-12-06 | End: 2017-12-06

## 2017-12-06 RX ORDER — GENTAMICIN SULFATE 40 MG/ML
80 VIAL (ML) INJECTION EVERY 8 HOURS
Qty: 0 | Refills: 0 | Status: COMPLETED | OUTPATIENT
Start: 2017-12-06 | End: 2017-12-07

## 2017-12-06 RX ORDER — ACETAMINOPHEN 500 MG
650 TABLET ORAL EVERY 6 HOURS
Qty: 0 | Refills: 0 | Status: DISCONTINUED | OUTPATIENT
Start: 2017-12-06 | End: 2017-12-07

## 2017-12-06 RX ORDER — HYDROMORPHONE HYDROCHLORIDE 2 MG/ML
0.25 INJECTION INTRAMUSCULAR; INTRAVENOUS; SUBCUTANEOUS
Qty: 0 | Refills: 0 | Status: DISCONTINUED | OUTPATIENT
Start: 2017-12-06 | End: 2017-12-07

## 2017-12-06 RX ORDER — ONDANSETRON 8 MG/1
4 TABLET, FILM COATED ORAL ONCE
Qty: 0 | Refills: 0 | Status: DISCONTINUED | OUTPATIENT
Start: 2017-12-06 | End: 2017-12-07

## 2017-12-06 RX ADMIN — SODIUM CHLORIDE 75 MILLILITER(S): 9 INJECTION, SOLUTION INTRAVENOUS at 17:04

## 2017-12-06 RX ADMIN — Medication 200 MILLIGRAM(S): at 21:41

## 2017-12-06 RX ADMIN — HEPARIN SODIUM 5000 UNIT(S): 5000 INJECTION INTRAVENOUS; SUBCUTANEOUS at 21:46

## 2017-12-06 NOTE — PROGRESS NOTE ADULT - SUBJECTIVE AND OBJECTIVE BOX
Post op Check    Pt seen and examined without complaints. Pain is controlled. Denies SOB/CP/N/V.     Vital Signs Last 24 Hrs  T(C): 36.2 (06 Dec 2017 16:30), Max: 36.6 (06 Dec 2017 09:26)  T(F): 97.2 (06 Dec 2017 16:30), Max: 97.9 (06 Dec 2017 09:26)  HR: 64 (06 Dec 2017 17:30) (51 - 78)  BP: 128/58 (06 Dec 2017 17:30) (81/45 - 158/68)  BP(mean): 83 (06 Dec 2017 17:30) (57 - 86)  RR: 13 (06 Dec 2017 16:30) (9 - 18)  SpO2: 97% (06 Dec 2017 17:30) (95% - 100%)    I&O's Summary    06 Dec 2017 07:01  -  06 Dec 2017 17:53  --------------------------------------------------------  IN: 225 mL / OUT: 450 mL / NET: -225 mL        Physical Exam  Gen: awake alert NAD AXOX3  Pulm: No respiratory distress, no subcostal retractions  CV: RRR, no JVD  Abd: Soft, NT, ND   :  uncirc phallus with monte in place yellow urine, incision rlq & perineum c/d/i                 A/P: 82y Male s/p  AUS   DVT prophylaxis/OOB  Incentive spirometry  Strict I&O's  Analgesia and antiemetics as needed  Diet  keep monte until am

## 2017-12-06 NOTE — PROGRESS NOTE ADULT - SUBJECTIVE AND OBJECTIVE BOX
Patient seen in PACU called due to possible Afib on Tele monitor. EKG was obtained which showed a first degree heart block with a right bundle branch block with heart rate of 60. Called cardiology noturnist who saw the EKGs and agrees that it is unchanged from the previous EKG done in July 2017. Patient currently stable with HR of 58 on Tele monitor. On previous note patient and patient's family refused a pacemaker for same EKG changes.

## 2017-12-06 NOTE — BRIEF OPERATIVE NOTE - PROCEDURE
<<-----Click on this checkbox to enter Procedure Cystoscopy  12/06/2017    Active  DAVID  Artificial urinary sphincter  12/06/2017    Active  MILAGROON

## 2017-12-07 ENCOUNTER — TRANSCRIPTION ENCOUNTER (OUTPATIENT)
Age: 82
End: 2017-12-07

## 2017-12-07 VITALS
DIASTOLIC BLOOD PRESSURE: 50 MMHG | OXYGEN SATURATION: 99 % | TEMPERATURE: 98 F | HEART RATE: 60 BPM | RESPIRATION RATE: 18 BRPM | SYSTOLIC BLOOD PRESSURE: 102 MMHG

## 2017-12-07 LAB — GLUCOSE BLDC GLUCOMTR-MCNC: 143 MG/DL — HIGH (ref 70–99)

## 2017-12-07 PROCEDURE — 82962 GLUCOSE BLOOD TEST: CPT

## 2017-12-07 PROCEDURE — C1815: CPT

## 2017-12-07 PROCEDURE — 93005 ELECTROCARDIOGRAM TRACING: CPT

## 2017-12-07 PROCEDURE — 53445 INSERT URO/VES NCK SPHINCTER: CPT

## 2017-12-07 RX ADMIN — Medication 200 MILLIGRAM(S): at 05:59

## 2017-12-07 RX ADMIN — OXYCODONE AND ACETAMINOPHEN 1 TABLET(S): 5; 325 TABLET ORAL at 04:19

## 2017-12-07 RX ADMIN — HEPARIN SODIUM 5000 UNIT(S): 5000 INJECTION INTRAVENOUS; SUBCUTANEOUS at 06:00

## 2017-12-07 RX ADMIN — Medication 650 MILLIGRAM(S): at 12:38

## 2017-12-07 RX ADMIN — HEPARIN SODIUM 5000 UNIT(S): 5000 INJECTION INTRAVENOUS; SUBCUTANEOUS at 14:52

## 2017-12-07 RX ADMIN — Medication 200 MILLIGRAM(S): at 14:52

## 2017-12-07 RX ADMIN — Medication 12.5 MILLIGRAM(S): at 05:59

## 2017-12-07 RX ADMIN — SODIUM CHLORIDE 75 MILLILITER(S): 9 INJECTION, SOLUTION INTRAVENOUS at 06:00

## 2017-12-07 RX ADMIN — OXYCODONE AND ACETAMINOPHEN 1 TABLET(S): 5; 325 TABLET ORAL at 04:50

## 2017-12-07 RX ADMIN — Medication 10 MILLIGRAM(S): at 12:23

## 2017-12-07 RX ADMIN — Medication 81 MILLIGRAM(S): at 12:38

## 2017-12-07 RX ADMIN — LOSARTAN POTASSIUM 25 MILLIGRAM(S): 100 TABLET, FILM COATED ORAL at 05:59

## 2017-12-07 NOTE — DISCHARGE NOTE ADULT - MEDICATION SUMMARY - MEDICATIONS TO TAKE
I will START or STAY ON the medications listed below when I get home from the hospital:    aspirin 81 mg oral tablet  -- 1 tab(s) by mouth once a day  -- Indication: For home medication     oxyCODONE-acetaminophen 5 mg-325 mg oral tablet  -- 1 tab(s) by mouth every 4 hours, As needed, Moderate Pain  -- Indication: For pain medication     losartan 25 mg oral tablet  -- 1 tab(s) by mouth once a day  -- Indication: For home medication     Lipitor 10 mg oral tablet  -- 1 tab(s) by mouth once a day  -- Indication: For home medication     hydroCHLOROthiazide 12.5 mg oral tablet  -- 1 tab(s) by mouth once a day  -- Indication: For home medication     senna oral tablet  -- 2 tab(s) by mouth once a day (at bedtime)  -- Indication: For Stool softener     mg oral tablet  -- 3 tab(s) by mouth once a day (at bedtime)  -- Indication: For home medication I will START or STAY ON the medications listed below when I get home from the hospital:    aspirin 81 mg oral tablet  -- 1 tab(s) by mouth once a day  -- Indication: For home medication     oxyCODONE-acetaminophen 5 mg-325 mg oral tablet  -- 1 tab(s) by mouth every 4 hours, As needed, Moderate Pain  -- Indication: For pain medication     losartan 25 mg oral tablet  -- 1 tab(s) by mouth once a day  -- Indication: For home medication     Lipitor 10 mg oral tablet  -- 1 tab(s) by mouth once a day  -- Indication: For home medication     Omnicef 300 mg oral capsule  -- 1 cap(s) by mouth every 12 hours for five days   -- Indication: For antibiotic for five days     hydroCHLOROthiazide 12.5 mg oral tablet  -- 1 tab(s) by mouth once a day  -- Indication: For home medication     senna oral tablet  -- 2 tab(s) by mouth once a day (at bedtime)  -- Indication: For Stool softener     mg oral tablet  -- 3 tab(s) by mouth once a day (at bedtime)  -- Indication: For home medication

## 2017-12-07 NOTE — PROGRESS NOTE ADULT - ASSESSMENT
83 YO M s/p AUS placement.   -No am labs   -Check HR   -DC mell TOELEAZAR   -OOB  -check GI function   -DC home today

## 2017-12-07 NOTE — PROGRESS NOTE ADULT - SUBJECTIVE AND OBJECTIVE BOX
UROLOGY DAILY PROGRESS NOTE:     Subjective: Patient seen and examined at bedside. No overnight events.       Objective:  Vital signs  T(F): , Max: 99.2 (12-07-17 @ 05:58)  HR: 73 (12-07-17 @ 05:58)  BP: 115/56 (12-07-17 @ 05:58)  SpO2: 97% (12-07-17 @ 05:58)  Wt(kg): --    I&O's Summary    06 Dec 2017 07:01  -  07 Dec 2017 06:11  --------------------------------------------------------  IN: 900 mL / OUT: 1520 mL / NET: -620 mL        Gen: NAD  Pulm: No respiratory distress, no subcostal retractions  CV: RRR, no JVD  Abd: Soft, NT, ND, abdominal RLQ  incision C/D/I   : Lemus clear urine, perineal dress C/D/ I

## 2017-12-07 NOTE — DISCHARGE NOTE ADULT - HOSPITAL COURSE
pt is an 83 yo m with a pmh of prostate cancer with urinary incontinence who arrived f placement of aus on 12/6/17. postop he did well. pain was controlled. labs and vitals were stable. he had his monte removed on pod 1. he will remain incontinent as his sphincter is inactive and will remain so. pt is an 83 yo m with a pmh of prostate cancer with urinary incontinence who arrived f placement of aus on 12/6/17. postop he did well. pain was controlled. labs and vitals were stable. he had his monte removed on pod 1. he was unable to urinate and monte was reinserted

## 2017-12-07 NOTE — DISCHARGE NOTE ADULT - CARE PLAN
Principal Discharge DX:	Breakdown of urinary electronic stimulator device, init  Goal:	you had a new sphincter placed  Instructions for follow-up, activity and diet:	Call the office if you experience fever, chills, uncontrolled pain, the inability to tolerate liquids, or the urine does not flow  Your sphincter is INACTIVE. This means that you will leak your urine for the next few weeks.   to promote wound healing do not take a bath, continue to walk frequently, return to daily living activities slowly, no heavy lifting greater than 10lbs for 4-6 weeks, follow up Dr King in 1 -2 weeks for a post op check Principal Discharge DX:	Breakdown of urinary electronic stimulator device, init  Goal:	you had a new sphincter placed  Instructions for follow-up, activity and diet:	Call the office if you experience fever, chills, uncontrolled pain, the inability to tolerate liquids, or the urine does not flow  You will be going home with monte.    to promote wound healing do not take a bath, continue to walk frequently, return to daily living activities slowly, no heavy lifting greater than 10lbs for 4-6 weeks, follow up Dr King tomorrow for monte removal

## 2017-12-07 NOTE — DISCHARGE NOTE ADULT - CONDITIONS AT DISCHARGE
pt A&O x's 4, vitals stable   ambulating with walker and assist , tolerating diet,  pain controlled well by oral meds monte reinserted today r/t urine retention,  and draining adequate  clear yellow urine  ivl dc'd  no s/s of infection or infiltration

## 2017-12-07 NOTE — DISCHARGE NOTE ADULT - PLAN OF CARE
you had a new sphincter placed Call the office if you experience fever, chills, uncontrolled pain, the inability to tolerate liquids, or the urine does not flow  Your sphincter is INACTIVE. This means that you will leak your urine for the next few weeks.   to promote wound healing do not take a bath, continue to walk frequently, return to daily living activities slowly, no heavy lifting greater than 10lbs for 4-6 weeks, follow up Dr Knig in 1 -2 weeks for a post op check Call the office if you experience fever, chills, uncontrolled pain, the inability to tolerate liquids, or the urine does not flow  You will be going home with monte.    to promote wound healing do not take a bath, continue to walk frequently, return to daily living activities slowly, no heavy lifting greater than 10lbs for 4-6 weeks, follow up Dr King tomorrow for monte removal

## 2017-12-07 NOTE — DISCHARGE NOTE ADULT - INSTRUCTIONS
regular call pmd if you have fever greater than 100 degrees, if you have pain unrelieved by medication ,  if your incision site becomes red, swollen, warm to touch,  if you see any oozing from site, or if you see fresh red blood.  call if you have difficulty urinating or if you have nausea or vomiting

## 2017-12-07 NOTE — DISCHARGE NOTE ADULT - CARE PROVIDER_API CALL
Uriel King), Urology  [address] [place] [address]  Phone: [phone]  Fax: [phone]

## 2017-12-07 NOTE — DISCHARGE NOTE ADULT - PATIENT PORTAL LINK FT
“You can access the Follow[employer] Patient Portal, offered by [facility], by registering with the following website: [email or web address]”

## 2017-12-12 ENCOUNTER — APPOINTMENT (OUTPATIENT)
Dept: UROLOGY | Facility: CLINIC | Age: 82
End: 2017-12-12
Payer: MEDICARE

## 2017-12-12 PROCEDURE — 51798 US URINE CAPACITY MEASURE: CPT

## 2017-12-12 PROCEDURE — 99024 POSTOP FOLLOW-UP VISIT: CPT

## 2017-12-13 PROBLEM — C61 MALIGNANT NEOPLASM OF PROSTATE: Chronic | Status: INACTIVE | Noted: 2017-01-30 | Resolved: 2017-11-27

## 2018-01-02 ENCOUNTER — APPOINTMENT (OUTPATIENT)
Dept: UROLOGY | Facility: CLINIC | Age: 83
End: 2018-01-02
Payer: MEDICARE

## 2018-01-02 PROCEDURE — 99024 POSTOP FOLLOW-UP VISIT: CPT

## 2018-01-03 ENCOUNTER — APPOINTMENT (OUTPATIENT)
Age: 83
End: 2018-01-03

## 2018-01-03 LAB
BILIRUB UR QL STRIP: NORMAL
CLARITY UR: CLEAR
COLLECTION METHOD: NORMAL
GLUCOSE UR-MCNC: NORMAL
HCG UR QL: 0.2 EU/DL
HGB UR QL STRIP.AUTO: ABNORMAL
KETONES UR-MCNC: NORMAL
LEUKOCYTE ESTERASE UR QL STRIP: NORMAL
NITRITE UR QL STRIP: NORMAL
PH UR STRIP: 6
PROT UR STRIP-MCNC: ABNORMAL
SP GR UR STRIP: 1.02

## 2018-01-09 ENCOUNTER — APPOINTMENT (OUTPATIENT)
Dept: UROLOGY | Facility: CLINIC | Age: 83
End: 2018-01-09
Payer: MEDICARE

## 2018-01-09 VITALS
SYSTOLIC BLOOD PRESSURE: 109 MMHG | HEART RATE: 53 BPM | TEMPERATURE: 98.5 F | DIASTOLIC BLOOD PRESSURE: 54 MMHG | OXYGEN SATURATION: 97 %

## 2018-01-09 PROCEDURE — 52000 CYSTOURETHROSCOPY: CPT | Mod: 58

## 2018-01-12 ENCOUNTER — APPOINTMENT (OUTPATIENT)
Dept: UROLOGY | Facility: CLINIC | Age: 83
End: 2018-01-12
Payer: MEDICARE

## 2018-01-12 VITALS
SYSTOLIC BLOOD PRESSURE: 131 MMHG | TEMPERATURE: 97.9 F | DIASTOLIC BLOOD PRESSURE: 58 MMHG | RESPIRATION RATE: 16 BRPM | HEART RATE: 57 BPM

## 2018-01-12 DIAGNOSIS — N39.3 STRESS INCONTINENCE (FEMALE) (MALE): ICD-10-CM

## 2018-01-12 DIAGNOSIS — T83.111D: ICD-10-CM

## 2018-01-12 PROCEDURE — 51798 US URINE CAPACITY MEASURE: CPT

## 2018-01-12 PROCEDURE — 99024 POSTOP FOLLOW-UP VISIT: CPT

## 2018-01-18 ENCOUNTER — APPOINTMENT (OUTPATIENT)
Dept: UROLOGY | Facility: CLINIC | Age: 83
End: 2018-01-18
Payer: MEDICARE

## 2018-01-18 ENCOUNTER — OUTPATIENT (OUTPATIENT)
Dept: OUTPATIENT SERVICES | Facility: HOSPITAL | Age: 83
LOS: 1 days | End: 2018-01-18
Payer: MEDICARE

## 2018-01-18 VITALS — DIASTOLIC BLOOD PRESSURE: 56 MMHG | SYSTOLIC BLOOD PRESSURE: 117 MMHG | TEMPERATURE: 99.2 F | HEART RATE: 67 BPM

## 2018-01-18 DIAGNOSIS — Z92.89 PERSONAL HISTORY OF OTHER MEDICAL TREATMENT: ICD-10-CM

## 2018-01-18 DIAGNOSIS — Z90.79 ACQUIRED ABSENCE OF OTHER GENITAL ORGAN(S): Chronic | ICD-10-CM

## 2018-01-18 DIAGNOSIS — Z00.00 ENCOUNTER FOR GENERAL ADULT MEDICAL EXAMINATION W/OUT ABNORMAL FINDINGS: ICD-10-CM

## 2018-01-18 DIAGNOSIS — N36.8 OTHER SPECIFIED DISORDERS OF URETHRA: ICD-10-CM

## 2018-01-18 DIAGNOSIS — Z98.890 OTHER SPECIFIED POSTPROCEDURAL STATES: Chronic | ICD-10-CM

## 2018-01-18 PROCEDURE — 52281 CYSTOSCOPY AND TREATMENT: CPT

## 2018-01-18 PROCEDURE — 99214 OFFICE O/P EST MOD 30 MIN: CPT | Mod: 25

## 2018-01-18 RX ORDER — CEPHALEXIN 500 MG/1
500 CAPSULE ORAL EVERY 8 HOURS
Qty: 15 | Refills: 0 | Status: ACTIVE | OUTPATIENT
Start: 2018-01-18

## 2018-01-19 LAB
APPEARANCE: ABNORMAL
BACTERIA: ABNORMAL
BILIRUBIN URINE: NEGATIVE
BLOOD URINE: ABNORMAL
COLOR: YELLOW
GLUCOSE QUALITATIVE U: NEGATIVE MG/DL
HYALINE CASTS: 0 /LPF
KETONES URINE: ABNORMAL
LEUKOCYTE ESTERASE URINE: ABNORMAL
MICROSCOPIC-UA: NORMAL
NITRITE URINE: NEGATIVE
PH URINE: >8.5
PROTEIN URINE: 100 MG/DL
RED BLOOD CELLS URINE: 16 /HPF
SPECIFIC GRAVITY URINE: 1.02
SQUAMOUS EPITHELIAL CELLS: 0 /HPF
TRIPLE PHOSPHATE CRYSTALS: ABNORMAL
UROBILINOGEN URINE: NEGATIVE MG/DL
WHITE BLOOD CELLS URINE: 89 /HPF

## 2018-01-22 LAB — BACTERIA UR CULT: ABNORMAL

## 2018-01-23 ENCOUNTER — APPOINTMENT (OUTPATIENT)
Dept: UROLOGY | Facility: CLINIC | Age: 83
End: 2018-01-23

## 2018-01-24 PROBLEM — N36.8 URETHRAL EROSION: Status: ACTIVE | Noted: 2018-01-24

## 2018-01-26 ENCOUNTER — APPOINTMENT (OUTPATIENT)
Dept: UROLOGY | Facility: CLINIC | Age: 83
End: 2018-01-26
Payer: MEDICARE

## 2018-01-26 VITALS
TEMPERATURE: 98 F | RESPIRATION RATE: 16 BRPM | DIASTOLIC BLOOD PRESSURE: 64 MMHG | SYSTOLIC BLOOD PRESSURE: 125 MMHG | HEART RATE: 57 BPM

## 2018-01-26 PROCEDURE — 99024 POSTOP FOLLOW-UP VISIT: CPT

## 2018-01-29 DIAGNOSIS — N36.8 OTHER SPECIFIED DISORDERS OF URETHRA: ICD-10-CM

## 2018-01-29 DIAGNOSIS — R33.8 OTHER RETENTION OF URINE: ICD-10-CM

## 2018-01-29 DIAGNOSIS — Z92.89 PERSONAL HISTORY OF OTHER MEDICAL TREATMENT: ICD-10-CM

## 2018-02-06 NOTE — CONSULT NOTE ADULT - SUBJECTIVE AND OBJECTIVE BOX
MEDICATION APPEAL APPROVED    Medication: PANTOPRAZOLE 20MG (APPEAL APPROVED)  Authorization Effective Date:01/30/2018    Authorization Expiration Date:  01/30/2019  Approved Dose/Quantity: 30 per 30  Reference #:     Insurance Company: [employer] - Phone [phone] Fax [phone]  Expected CoPay:       CoPay Card Available:      Foundation Assistance Needed:    Which Pharmacy is filling the prescription (Not needed for infusion/clinic administered): [facility] [place] [id number]    A letter is being sent to the office, will scan in once received.       Date of Admission:    CHIEF COMPLAINT:    HISTORY OF PRESENT ILLNESS:  82yoM, with DM, prostate CA s/p prostatectomy, ureteral artificial sphincter placement for incontinence.   Patient admitted for sphincter removal.which was done yesterday. Today on tele, he was noted to have episodes of complete heart block.    he has remained asymptomatic throughout, and has no dizziness or syncope here, or at home. He lives at [facility], but is functional and walks around.     He denies any chest pain, nausea, dyspnea or fatigue.     Allergies    No Known Allergies    Intolerances    	    MEDICATIONS:  aspirin enteric coated 81 milliGRAM(s) Oral daily  hydrochlorothiazide 12.5 milliGRAM(s) Oral daily  heparin  Injectable 5000 Unit(s) SubCutaneous every 8 hours  losartan 25 milliGRAM(s) Oral daily    piperacillin/tazobactam IVPB. 3.375 Gram(s) IV Intermittent every 8 hours  vancomycin  IVPB 1000 milliGRAM(s) IV Intermittent every 12 hours  vancomycin  IVPB   IV Intermittent         docusate sodium 100 milliGRAM(s) Oral two times a day    atorvastatin 10 milliGRAM(s) Oral at bedtime  insulin lispro (HumaLOG) corrective regimen sliding scale   SubCutaneous three times a day before meals  insulin lispro (HumaLOG) corrective regimen sliding scale   SubCutaneous at bedtime    lactated ringers. 1000 milliLiter(s) IV Continuous <Continuous>      PAST MEDICAL & SURGICAL HISTORY:  Prostate CA  Diabetes mellitus  Urinary incontinence  H/O prostatectomy      FAMILY HISTORY:  No pertinent family history in first degree relatives      SOCIAL HISTORY:    [ ] Non-smoker  [ ] Smoker  [ ] Alcohol      REVIEW OF SYSTEMS:  See HPI. Otherwise, 10 point ROS done and otherwise negative.    PHYSICAL EXAM:  T(C): 36.4 (07-07-17 @ 13:00), Max: 36.8 (07-06-17 @ 19:00)  HR: 46 (07-07-17 @ 13:00) (46 - 57)  BP: 136/54 (07-07-17 @ 13:00) (104/78 - 141/47)  RR: 18 (07-07-17 @ 13:00) (15 - 18)  SpO2: 98% (07-07-17 @ 13:00) (96% - 99%)  Wt(kg): --  I&O's Summary    06 Jul 2017 07:01  -  07 Jul 2017 07:00  --------------------------------------------------------  IN: 2500 mL / OUT: 2460 mL / NET: 40 mL        Appearance: Normal	  HEENT:   Normal oral mucosa, PERRL, EOMI	  Lymphatic: No lymphadenopathy  Cardiovascular: Normal S1 S2, No JVD, No murmurs, No edema  Respiratory: Lungs clear to auscultation	  Psychiatry: A & O x 3, Mood & affect appropriate  Gastrointestinal:  Soft, Non-tender, + BS	  Skin: No rashes, No ecchymoses, No cyanosis	  Neurologic: Non-focal  Extremities: Normal range of motion, No clubbing, cyanosis or edema  Vascular: Peripheral pulses palpable 2+ bilaterally        LABS:	 	    CBC Full  -  ( 07 Jul 2017 06:02 )  WBC Count : 8.1 K/uL  Hemoglobin : 12.3 g/dL  Hematocrit : 37.0 %  Platelet Count - Automated : 184 K/uL  Mean Cell Volume : 91.8 fl  Mean Cell Hemoglobin : 30.5 pg  Mean Cell Hemoglobin Concentration : 33.2 gm/dL  Auto Neutrophil # : x  Auto Lymphocyte # : x  Auto Monocyte # : x  Auto Eosinophil # : x  Auto Basophil # : x  Auto Neutrophil % : x  Auto Lymphocyte % : x  Auto Monocyte % : x  Auto Eosinophil % : x  Auto Basophil % : x    07-07    142  |  106  |  19  ----------------------------<  104<H>  3.7   |  27  |  1.09  07-05    143  |  103  |  14  ----------------------------<  135<H>  3.9   |  24  |  1.11    Ca    8.7      07 Jul 2017 06:02  Ca    8.5      05 Jul 2017 18:29        proBNP:   Lipid Profile:   HgA1c:   TSH:     TELEMETRY: 	  five episodes of CHB lasting 10 seconds. No long pauses  ECG:  	  NSR @ 43.  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   RADIOLOGY:  OTHER: 	    PREVIOUS DIAGNOSTIC TESTING:    [ ] Echocardiogram: < from: Transthoracic Echocardiogram (07.05.17 @ 21:18) >    Patient name: ZINA MADRID  YOB: 1934   Age: 82 (M)   MR#: [id number]  Study Date: 7/5/2017  Location: [address] [place], [place] [id number]: Amanda Coats RDCS  Study quality: Technically fair  Referring Physician: Uriel King MD  Blood Pressure: 149/55 mmHg  Height: 157 cm  Weight: 60 kg  BSA: 1.6 m2  ------------------------------------------------------------------------  PROCEDURE: Transthoracic echocardiogram with 2-D, M-Mode  and complete spectral and color flow Doppler.  INDICATION: Abnormalelectrocardiogram (ECG) (EKG) (R94.31)  ------------------------------------------------------------------------  Dimensions:    Normal Values:  LA:     3.0    2.0 - 4.0 cm  Ao:     3.8    2.0 - 3.8 cm  SEPTUM: 1.0    0.6 - 1.2 cm  PWT:    1.0    0.6- 1.1 cm  LVIDd:  5.4    3.0 - 5.6 cm  LVIDs:  2.9    1.8 - 4.0 cm  Derived variables:  LVMI: 129 g/m2  RWT: 0.37  Fractional short: 46 %  EF (Visual Estimate): 70-75 %  ------------------------------------------------------------------------  Observations:  Mitral Valve: Bileaflet mitral valve prolapse (anterior >  posterior).There is focal thickening on the atrial side of  the anterior mitral valve. This is suspicous for vegetation  in the appropriate clinical setting.  At least moderate,  eccentric mitral regurgitation (may be underestimated)  Aortic Valve/Aorta: Calcified trileaflet aortic valve with  normal opening. Mild-moderate aortic regurgitation.  Upper limits of normal aortic root size for BSA.  (Ao: 3.8  cm at the sinuses of Valsalva).  Left Atrium: Normal left atrium.  LA volume index = 21  cc/m2.  Left Ventricle: Normal left ventricular systolic function.  No segmental wall motion abnormalities. Normal left  ventricular internal dimensions and wall thicknesses. Basal  septalhypertrophy present. Reversal of the E-A waves of  the mitral inflow pattern consistent with reduced  compliance of the left ventricle.  Right Heart: Normal right atrium. Normal right ventricular  size and function. Normal tricuspid valve. Mild tricuspid  regurgitation. Normal pulmonic valve. Mild pulmonic  regurgitation.  Pericardium/Pleura: No pericardial effusion seen.  Hemodynamic: Estimated right atrial pressure is 8 mm Hg.  Estimated right ventricular systolic pressure equals 31 mm  Hg, assuming right atrial pressure equals 8 mm Hg,  consistent with normal pulmonary pressures.  ------------------------------------------------------------------------  Conclusions:  1. 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 This is suspicous for vegetation  in the appropriate clinical setting.  At least moderate,  eccentric mitral regurgitation (may be underestimated)  2. Calcified trileaflet aortic valve withnormal opening.  Mild-moderate aortic regurgitation.  3. Normal left ventricular systolic function. No segmental  wall motion abnormalities.  4. Normal right ventricular size and function.  *** No previous Echo exam. Dr. Richter notified of  findings at 1130 AM on 7/5/17. CARL could better evaluate  the mitral and aortic valves if clinically indicated.  ------------------------------------------------------------------------  Confirmed on  7/5/2017 - 11:34:46 by Krunal Allen M.D.  ----------------------------------------------------------------------    < end of copied text >

## 2018-03-02 ENCOUNTER — INPATIENT (INPATIENT)
Facility: HOSPITAL | Age: 83
LOS: 6 days | Discharge: HOME | DRG: 671 | End: 2018-03-09
Attending: UROLOGY | Admitting: UROLOGY
Payer: MEDICARE

## 2018-03-02 ENCOUNTER — APPOINTMENT (OUTPATIENT)
Dept: UROLOGY | Facility: CLINIC | Age: 83
End: 2018-03-02
Payer: MEDICARE

## 2018-03-02 ENCOUNTER — OUTPATIENT (OUTPATIENT)
Dept: OUTPATIENT SERVICES | Facility: HOSPITAL | Age: 83
LOS: 1 days | End: 2018-03-02
Payer: MEDICARE

## 2018-03-02 VITALS
HEART RATE: 58 BPM | DIASTOLIC BLOOD PRESSURE: 56 MMHG | OXYGEN SATURATION: 96 % | RESPIRATION RATE: 16 BRPM | SYSTOLIC BLOOD PRESSURE: 132 MMHG | TEMPERATURE: 98 F | WEIGHT: 120.59 LBS | HEIGHT: 66 IN

## 2018-03-02 VITALS
DIASTOLIC BLOOD PRESSURE: 70 MMHG | SYSTOLIC BLOOD PRESSURE: 124 MMHG | RESPIRATION RATE: 16 BRPM | HEART RATE: 54 BPM | TEMPERATURE: 97.6 F

## 2018-03-02 DIAGNOSIS — I34.0 NONRHEUMATIC MITRAL (VALVE) INSUFFICIENCY: ICD-10-CM

## 2018-03-02 DIAGNOSIS — T83.111A BREAKDOWN (MECHANICAL) OF IMPLANTED URINARY SPHINCTER, INITIAL ENCOUNTER: ICD-10-CM

## 2018-03-02 DIAGNOSIS — R32 UNSPECIFIED URINARY INCONTINENCE: ICD-10-CM

## 2018-03-02 DIAGNOSIS — I45.9 CONDUCTION DISORDER, UNSPECIFIED: ICD-10-CM

## 2018-03-02 DIAGNOSIS — Z90.79 ACQUIRED ABSENCE OF OTHER GENITAL ORGAN(S): Chronic | ICD-10-CM

## 2018-03-02 DIAGNOSIS — Z98.890 OTHER SPECIFIED POSTPROCEDURAL STATES: Chronic | ICD-10-CM

## 2018-03-02 DIAGNOSIS — T83.89XD OTHER SPECIFIED COMPLICATION OF GENITOURINARY PROSTHETIC DEVICES, IMPLANTS AND GRAFTS, SUBSEQUENT ENCOUNTER: ICD-10-CM

## 2018-03-02 DIAGNOSIS — T14.8XXA OTHER INJURY OF UNSPECIFIED BODY REGION, INITIAL ENCOUNTER: ICD-10-CM

## 2018-03-02 DIAGNOSIS — E11.9 TYPE 2 DIABETES MELLITUS WITHOUT COMPLICATIONS: ICD-10-CM

## 2018-03-02 DIAGNOSIS — R33.8 OTHER RETENTION OF URINE: ICD-10-CM

## 2018-03-02 DIAGNOSIS — Z01.818 ENCOUNTER FOR OTHER PREPROCEDURAL EXAMINATION: ICD-10-CM

## 2018-03-02 DIAGNOSIS — I10 ESSENTIAL (PRIMARY) HYPERTENSION: ICD-10-CM

## 2018-03-02 DIAGNOSIS — N36.8 OTHER SPECIFIED COMPLICATION OF GENITOURINARY PROSTHETIC DEVICES, IMPLANTS AND GRAFTS, SUBSEQUENT ENCOUNTER: ICD-10-CM

## 2018-03-02 LAB
ANION GAP SERPL CALC-SCNC: 14 MMOL/L — SIGNIFICANT CHANGE UP (ref 5–17)
BASOPHILS # BLD AUTO: 0.1 K/UL — SIGNIFICANT CHANGE UP (ref 0–0.2)
BASOPHILS NFR BLD AUTO: 0.9 % — SIGNIFICANT CHANGE UP (ref 0–2)
BUN SERPL-MCNC: 27 MG/DL — HIGH (ref 7–23)
CALCIUM SERPL-MCNC: 9.9 MG/DL — SIGNIFICANT CHANGE UP (ref 8.4–10.5)
CHLORIDE SERPL-SCNC: 98 MMOL/L — SIGNIFICANT CHANGE UP (ref 96–108)
CO2 SERPL-SCNC: 27 MMOL/L — SIGNIFICANT CHANGE UP (ref 22–31)
CREAT SERPL-MCNC: 0.94 MG/DL — SIGNIFICANT CHANGE UP (ref 0.5–1.3)
EOSINOPHIL # BLD AUTO: 0.1 K/UL — SIGNIFICANT CHANGE UP (ref 0–0.5)
EOSINOPHIL NFR BLD AUTO: 1.5 % — SIGNIFICANT CHANGE UP (ref 0–6)
GLUCOSE BLDC GLUCOMTR-MCNC: 100 MG/DL — HIGH (ref 70–99)
GLUCOSE BLDC GLUCOMTR-MCNC: 100 MG/DL — HIGH (ref 70–99)
GLUCOSE SERPL-MCNC: 98 MG/DL — SIGNIFICANT CHANGE UP (ref 70–99)
HCT VFR BLD CALC: 39.6 % — SIGNIFICANT CHANGE UP (ref 39–50)
HGB BLD-MCNC: 13.7 G/DL — SIGNIFICANT CHANGE UP (ref 13–17)
LYMPHOCYTES # BLD AUTO: 2.6 K/UL — SIGNIFICANT CHANGE UP (ref 1–3.3)
LYMPHOCYTES # BLD AUTO: 35.5 % — SIGNIFICANT CHANGE UP (ref 13–44)
MCHC RBC-ENTMCNC: 32.1 PG — SIGNIFICANT CHANGE UP (ref 27–34)
MCHC RBC-ENTMCNC: 34.5 GM/DL — SIGNIFICANT CHANGE UP (ref 32–36)
MCV RBC AUTO: 93.1 FL — SIGNIFICANT CHANGE UP (ref 80–100)
MONOCYTES # BLD AUTO: 0.6 K/UL — SIGNIFICANT CHANGE UP (ref 0–0.9)
MONOCYTES NFR BLD AUTO: 7.7 % — SIGNIFICANT CHANGE UP (ref 2–14)
NEUTROPHILS # BLD AUTO: 3.9 K/UL — SIGNIFICANT CHANGE UP (ref 1.8–7.4)
NEUTROPHILS NFR BLD AUTO: 54.5 % — SIGNIFICANT CHANGE UP (ref 43–77)
PLATELET # BLD AUTO: 249 K/UL — SIGNIFICANT CHANGE UP (ref 150–400)
POTASSIUM SERPL-MCNC: 3.7 MMOL/L — SIGNIFICANT CHANGE UP (ref 3.5–5.3)
POTASSIUM SERPL-SCNC: 3.7 MMOL/L — SIGNIFICANT CHANGE UP (ref 3.5–5.3)
RBC # BLD: 4.25 M/UL — SIGNIFICANT CHANGE UP (ref 4.2–5.8)
RBC # FLD: 13.2 % — SIGNIFICANT CHANGE UP (ref 10.3–14.5)
SODIUM SERPL-SCNC: 139 MMOL/L — SIGNIFICANT CHANGE UP (ref 135–145)
WBC # BLD: 7.2 K/UL — SIGNIFICANT CHANGE UP (ref 3.8–10.5)
WBC # FLD AUTO: 7.2 K/UL — SIGNIFICANT CHANGE UP (ref 3.8–10.5)

## 2018-03-02 PROCEDURE — 52000 CYSTOURETHROSCOPY: CPT

## 2018-03-02 PROCEDURE — 52000 CYSTOURETHROSCOPY: CPT | Mod: 58

## 2018-03-02 PROCEDURE — 99223 1ST HOSP IP/OBS HIGH 75: CPT | Mod: GC

## 2018-03-02 RX ORDER — GLUCAGON INJECTION, SOLUTION 0.5 MG/.1ML
1 INJECTION, SOLUTION SUBCUTANEOUS ONCE
Qty: 0 | Refills: 0 | Status: DISCONTINUED | OUTPATIENT
Start: 2018-03-02 | End: 2018-03-07

## 2018-03-02 RX ORDER — DEXTROSE 50 % IN WATER 50 %
25 SYRINGE (ML) INTRAVENOUS ONCE
Qty: 0 | Refills: 0 | Status: DISCONTINUED | OUTPATIENT
Start: 2018-03-02 | End: 2018-03-07

## 2018-03-02 RX ORDER — SENNA PLUS 8.6 MG/1
2 TABLET ORAL AT BEDTIME
Qty: 0 | Refills: 0 | Status: DISCONTINUED | OUTPATIENT
Start: 2018-03-02 | End: 2018-03-07

## 2018-03-02 RX ORDER — LOSARTAN POTASSIUM 100 MG/1
25 TABLET, FILM COATED ORAL DAILY
Qty: 0 | Refills: 0 | Status: DISCONTINUED | OUTPATIENT
Start: 2018-03-02 | End: 2018-03-07

## 2018-03-02 RX ORDER — DOCUSATE SODIUM 100 MG
100 CAPSULE ORAL
Qty: 0 | Refills: 0 | Status: DISCONTINUED | OUTPATIENT
Start: 2018-03-02 | End: 2018-03-07

## 2018-03-02 RX ORDER — VANCOMYCIN HCL 1 G
750 VIAL (EA) INTRAVENOUS EVERY 24 HOURS
Qty: 0 | Refills: 0 | Status: DISCONTINUED | OUTPATIENT
Start: 2018-03-02 | End: 2018-03-04

## 2018-03-02 RX ORDER — PIPERACILLIN AND TAZOBACTAM 4; .5 G/20ML; G/20ML
3.38 INJECTION, POWDER, LYOPHILIZED, FOR SOLUTION INTRAVENOUS EVERY 8 HOURS
Qty: 0 | Refills: 0 | Status: DISCONTINUED | OUTPATIENT
Start: 2018-03-02 | End: 2018-03-02

## 2018-03-02 RX ORDER — CEFDINIR 250 MG/5ML
1 POWDER, FOR SUSPENSION ORAL
Qty: 0 | Refills: 0 | COMMUNITY

## 2018-03-02 RX ORDER — HEPARIN SODIUM 5000 [USP'U]/ML
5000 INJECTION INTRAVENOUS; SUBCUTANEOUS EVERY 8 HOURS
Qty: 0 | Refills: 0 | Status: DISCONTINUED | OUTPATIENT
Start: 2018-03-02 | End: 2018-03-07

## 2018-03-02 RX ORDER — PIPERACILLIN AND TAZOBACTAM 4; .5 G/20ML; G/20ML
3.38 INJECTION, POWDER, LYOPHILIZED, FOR SOLUTION INTRAVENOUS EVERY 8 HOURS
Qty: 0 | Refills: 0 | Status: DISCONTINUED | OUTPATIENT
Start: 2018-03-02 | End: 2018-03-07

## 2018-03-02 RX ORDER — PIPERACILLIN AND TAZOBACTAM 4; .5 G/20ML; G/20ML
4.5 INJECTION, POWDER, LYOPHILIZED, FOR SOLUTION INTRAVENOUS EVERY 12 HOURS
Qty: 0 | Refills: 0 | Status: DISCONTINUED | OUTPATIENT
Start: 2018-03-02 | End: 2018-03-02

## 2018-03-02 RX ORDER — SODIUM CHLORIDE 9 MG/ML
1000 INJECTION, SOLUTION INTRAVENOUS
Qty: 0 | Refills: 0 | Status: DISCONTINUED | OUTPATIENT
Start: 2018-03-02 | End: 2018-03-07

## 2018-03-02 RX ORDER — DEXTROSE 50 % IN WATER 50 %
1 SYRINGE (ML) INTRAVENOUS ONCE
Qty: 0 | Refills: 0 | Status: DISCONTINUED | OUTPATIENT
Start: 2018-03-02 | End: 2018-03-07

## 2018-03-02 RX ORDER — DEXTROSE 50 % IN WATER 50 %
12.5 SYRINGE (ML) INTRAVENOUS ONCE
Qty: 0 | Refills: 0 | Status: DISCONTINUED | OUTPATIENT
Start: 2018-03-02 | End: 2018-03-07

## 2018-03-02 RX ORDER — INSULIN LISPRO 100/ML
VIAL (ML) SUBCUTANEOUS AT BEDTIME
Qty: 0 | Refills: 0 | Status: DISCONTINUED | OUTPATIENT
Start: 2018-03-02 | End: 2018-03-07

## 2018-03-02 RX ORDER — INSULIN LISPRO 100/ML
VIAL (ML) SUBCUTANEOUS
Qty: 0 | Refills: 0 | Status: DISCONTINUED | OUTPATIENT
Start: 2018-03-02 | End: 2018-03-07

## 2018-03-02 RX ORDER — ATORVASTATIN CALCIUM 80 MG/1
10 TABLET, FILM COATED ORAL AT BEDTIME
Qty: 0 | Refills: 0 | Status: DISCONTINUED | OUTPATIENT
Start: 2018-03-02 | End: 2018-03-07

## 2018-03-02 RX ORDER — ASPIRIN/CALCIUM CARB/MAGNESIUM 324 MG
81 TABLET ORAL DAILY
Qty: 0 | Refills: 0 | Status: DISCONTINUED | OUTPATIENT
Start: 2018-03-02 | End: 2018-03-07

## 2018-03-02 RX ORDER — HYDROCHLOROTHIAZIDE 25 MG
12.5 TABLET ORAL DAILY
Qty: 0 | Refills: 0 | Status: DISCONTINUED | OUTPATIENT
Start: 2018-03-02 | End: 2018-03-07

## 2018-03-02 RX ORDER — VANCOMYCIN HCL 1 G
750 VIAL (EA) INTRAVENOUS ONCE
Qty: 0 | Refills: 0 | Status: DISCONTINUED | OUTPATIENT
Start: 2018-03-02 | End: 2018-03-02

## 2018-03-02 RX ADMIN — LOSARTAN POTASSIUM 25 MILLIGRAM(S): 100 TABLET, FILM COATED ORAL at 19:19

## 2018-03-02 RX ADMIN — Medication 12.5 MILLIGRAM(S): at 19:19

## 2018-03-02 RX ADMIN — PIPERACILLIN AND TAZOBACTAM 25 GRAM(S): 4; .5 INJECTION, POWDER, LYOPHILIZED, FOR SOLUTION INTRAVENOUS at 23:01

## 2018-03-02 RX ADMIN — Medication 100 MILLIGRAM(S): at 19:19

## 2018-03-02 RX ADMIN — ATORVASTATIN CALCIUM 10 MILLIGRAM(S): 80 TABLET, FILM COATED ORAL at 21:43

## 2018-03-02 RX ADMIN — Medication 150 MILLIGRAM(S): at 21:43

## 2018-03-02 RX ADMIN — Medication 81 MILLIGRAM(S): at 19:19

## 2018-03-02 RX ADMIN — HEPARIN SODIUM 5000 UNIT(S): 5000 INJECTION INTRAVENOUS; SUBCUTANEOUS at 21:42

## 2018-03-02 RX ADMIN — SENNA PLUS 2 TABLET(S): 8.6 TABLET ORAL at 21:42

## 2018-03-02 NOTE — CONSULT NOTE ADULT - ASSESSMENT
Mr. Dixon is an 84 yo cantonese speaking man w/ hx of prostate CA 20 years ago s/p prostatectomy w/ artificial urinary sphincter (AUS) placed in 1999;, in x many years then removed (7/17) when he was found to have structural and EP cardiac dz. Replaced 12/17. He is now p/w urinary retention in setting of erosion of AUS, here for removal of AUS.

## 2018-03-02 NOTE — H&P ADULT - PMH
Dementia    Diabetes mellitus  no meds needed now, recent wt loss  DM (diabetes mellitus)    Hyperlipidemia    Hypertension    Prostate CA    Urinary incontinence

## 2018-03-02 NOTE — CONSULT NOTE ADULT - PROBLEM SELECTOR RECOMMENDATION 2
- known intermittent CHB; in past refused PPM. Evaluated by EP in past.   - tele overnight to eval burden of CHB   - he underwent AUS replacement in 12/17 w/ out PPM (CHB w/ significant conduction dz identified in 7/17)- went well w/ out issue.   - d/w patient w/ [place]  and daughter, Lorenza at length. Again discussed risks benefits, particularly risk of undergoing anesthesia and procedure w/ out PM- they still refuse.   - EP to see tmrw and discuss one last time.   - Ultimately he has had urologic procedure before w/ out PPM since July, though I would prefer to optimize safety with one in. If EP confirms that family and patient do not want one pre-operatively, we will need to move forward.   - d/w urology; this needs to happen inpatient.   - observe on telemetry x 24 hrs and await EP input.  - hold all AV isabella agents. - known intermittent CHB; in past refused PPM. Evaluated by EP in past.   - tele overnight to eval burden of CHB   - he underwent AUS replacement in 12/17 w/ out PPM (CHB w/ significant conduction dz identified in 7/17)- went well w/ out issue.   - d/w patient w/ cantonese  and daughter, Lorenza at length. Again discussed risks benefits, particularly risk of undergoing anesthesia and procedure w/ out PM- they still refuse PPM placement.  - EP to see tmrw and discuss one last time.   - Ultimately he has had urologic procedure before w/ out PPM since July, though I would prefer to optimize safety with PPM in prior to procedure. If EP confirms that family and patient do not want PPM pre-operatively, we will need to move forward.   - d/w urology; this needs to happen inpatient.   - observe on telemetry x 24 hrs and await EP input.  - hold all AV isabella agents.

## 2018-03-02 NOTE — H&P ADULT - HISTORY OF PRESENT ILLNESS
82M PMH T2DM, Prostate CA s/p prostatectomy (~20 yrs ago) s/p AUS p/w urinary retention. He has baseline urinary incontinence after his prostatectomy and has a incontinence control device implant which he uses to assist with urination. Patient s/p AUS explant found to have asymptomatic bradycardia and thickened MV on TTE 07/2017. Decmeber 2017 patient s/p AUS placement. Patient admits to drainage from the RLQ incision site, which he only admits to clear drainage no purulent discharge or blood. Patient currently admitted due to urinary retention with associated erosion of AUS; monte catheter was placed then removed. Patient denies f/n/v/d or chills.

## 2018-03-02 NOTE — PATIENT PROFILE ADULT. - CAREGIVER OBTAIN DETAILS
Pt transferred from nursing home/ Cantonese speaking- unable to appoint supportive/caregiving person

## 2018-03-02 NOTE — H&P ADULT - NSHPPHYSICALEXAM_GEN_ALL_CORE
Gen: NAD  Pulm: No respiratory distress, no subcostal retractions  CV: RRR, no JVD  Abd: Soft, NT, mildly distended   Back: No CVAT  : RLQ incision with clear drainage over reservoir incision   No monte in place   MSK: No edema present

## 2018-03-02 NOTE — CONSULT NOTE ADULT - SUBJECTIVE AND OBJECTIVE BOX
Mr. Dixon is an 86 yo cantonese speaking man w/ hx of prostate CA 20 years ago s/p prostatectomy w/ artificial urinary sphincter (AUS) placed in 1999;, in x many years then removed (7/17) when he was found to have structural and EP cardiac dz. Replaced 12/17. He is now p/w urinary retention in setting of erosion of AUS, here for removal of AUS.     Discussed w/ patient using Cantonese . Per our conversation and chart review: Mr. Dixon was initially here in 7/17 for removal of eroded AUS. Underwent procedure w/ out complication though was found to have significant cardiac conduction dz w/ intermittent CHB + severe MR as above. Seen by EP, cardiology and structural heart who recc'd PPM, r/o infective endocarditis and mitraclip, respectively. Patient refused PPM and mitraclip. No recent n/v/f/c. No orthopnea, no dyspnea on exertion, no cough, no worsening SOB. He feels well, he said he was just sent in to have his device removed; urinary retention was relieved w/ monte which is now out. No drainage from his RLQ AUS site. Discussed at length with his daughter, Lorenza, as below.    REVIEW OF SYSTEMS:  CONSTITUTIONAL: No fever, chills or fatigue  ENMT:  No difficulty hearing, tinnitus, vertigo; No sinus or throat pain  NECK: No pain or stiffness  RESPIRATORY: No cough, wheezing, chills or hemoptysis; No shortness of breath  CARDIOVASCULAR: No chest pain, palpitations, dizziness, or leg swelling  GASTROINTESTINAL: No abdominal or epigastric pain. No nausea, vomiting, or hematemesis; No diarrhea or constipation. No melena or hematochezia.  GENITOURINARY: +clear discharge at site of AUS though no purulence.  NEUROLOGICAL: No headaches, memory loss, loss of strength, numbness, or tremors  SKIN: No itching, burning, rashes, or lesions   ENDOCRINE: No heat or cold intolerance; No hair loss  MUSCULOSKELETAL: No joint pain or swelling; No muscle, back, or extremity pain  PSYCHIATRIC: No depression, anxiety, mood swings, or difficulty sleeping  HEME/LYMPH: No easy bruising, or bleeding gums    T(C): 36.4 (03-02-18 @ 15:10), Max: 36.4 (03-02-18 @ 15:10)  HR: 58 (03-02-18 @ 15:10) (58 - 58)  BP: 132/56 (03-02-18 @ 15:10) (132/56 - 132/56)  RR: 16 (03-02-18 @ 15:10) (16 - 16)  SpO2: 96% (03-02-18 @ 15:10) (96% - 96%)  Wt(kg): --Vital Signs Last 24 Hrs  T(C): 36.4 (02 Mar 2018 15:10), Max: 36.4 (02 Mar 2018 15:10)  T(F): 97.6 (02 Mar 2018 15:10), Max: 97.6 (02 Mar 2018 15:10)  HR: 58 (02 Mar 2018 15:10) (58 - 58)  BP: 132/56 (02 Mar 2018 15:10) (132/56 - 132/56)  BP(mean): --  RR: 16 (02 Mar 2018 15:10) (16 - 16)  SpO2: 96% (02 Mar 2018 15:10) (96% - 96%)    PHYSICAL EXAM:  GENERAL: NAD, well-groomed. Elderly cantonese speaking man,  phone by bedside. Laying supine, 1 pillow. Good spirits. No glasses or hearing aids.   HEAD:  Atraumatic, Normocephalic  EYES: EOMI, conjunctiva and sclera clear  NECK: Supple, No JVD  NERVOUS SYSTEM:  Alert & Oriented X3, Good concentration; Motor Strength 5/5 B/L upper and lower extremities  CHEST/LUNG: Clear to percussion bilaterally; No rales, rhonchi, wheezing  HEART: Regular rate and rhythm; 4/6 systolic murmur at apex w/ radiation to axilla.   ABDOMEN: Soft, Nontender, Nondistended; Bowel sounds present  EXTREMITIES:  2+ Peripheral Pulses, No clubbing, cyanosis, or edema  SKIN: No rashes or lesions    Consultant(s) Notes Reviewed:  [x ] YES  [ ] NO  LABS: none  CAPILLARY BLOOD GLUCOSE      POCT Blood Glucose.: 100 mg/dL (02 Mar 2018 16:49)      RADIOLOGY & ADDITIONAL TESTS:  Imaging Personally Reviewed: none  Care discussed w/ other providers: yes--  Consultant notes reviewed: yes Mr. Dixon is an 84 yo cantonese speaking man w/ hx of prostate CA 20 years ago s/p prostatectomy w/ artificial urinary sphincter (AUS) placed in 1999;, in x many years then removed (7/17) when he was found to have structural and EP cardiac dz. Replaced 12/17. He is now p/w urinary retention in setting of erosion of AUS, here for removal of AUS.     Discussed w/ patient using Cantonese . Per our conversation and chart review: Mr. Dixon was initially here in 7/17 for removal of eroded AUS. Underwent procedure w/ out complication though was found to have significant cardiac conduction dz w/ intermittent CHB + severe MR as above. Seen by EP, cardiology and structural heart who recc'd PPM, r/o infective endocarditis and mitraclip, respectively. Patient refused PPM and mitraclip. He more recently underwent AUS placement in 12/17, proceeded w/ out complication.     No recent n/v/f/c. No orthopnea, no dyspnea on exertion, no cough, no worsening SOB. He feels well, he said he was just sent in to have his device removed; urinary retention was relieved w/ monte which is now out. No drainage from his RLQ AUS site. Discussed at length with his daughter, Lorenza, as below.    REVIEW OF SYSTEMS:  CONSTITUTIONAL: No fever, chills or fatigue  ENMT:  No difficulty hearing, tinnitus, vertigo; No sinus or throat pain  NECK: No pain or stiffness  RESPIRATORY: No cough, wheezing, chills or hemoptysis; No shortness of breath  CARDIOVASCULAR: No chest pain, palpitations, dizziness, or leg swelling  GASTROINTESTINAL: No abdominal or epigastric pain. No nausea, vomiting, or hematemesis; No diarrhea or constipation. No melena or hematochezia.  GENITOURINARY: +clear discharge at site of AUS though no purulence.  NEUROLOGICAL: No headaches, memory loss, loss of strength, numbness, or tremors  SKIN: No itching, burning, rashes, or lesions   ENDOCRINE: No heat or cold intolerance; No hair loss  MUSCULOSKELETAL: No joint pain or swelling; No muscle, back, or extremity pain  PSYCHIATRIC: No depression, anxiety, mood swings, or difficulty sleeping  HEME/LYMPH: No easy bruising, or bleeding gums    T(C): 36.4 (03-02-18 @ 15:10), Max: 36.4 (03-02-18 @ 15:10)  HR: 58 (03-02-18 @ 15:10) (58 - 58)  BP: 132/56 (03-02-18 @ 15:10) (132/56 - 132/56)  RR: 16 (03-02-18 @ 15:10) (16 - 16)  SpO2: 96% (03-02-18 @ 15:10) (96% - 96%)  Wt(kg): --Vital Signs Last 24 Hrs  T(C): 36.4 (02 Mar 2018 15:10), Max: 36.4 (02 Mar 2018 15:10)  T(F): 97.6 (02 Mar 2018 15:10), Max: 97.6 (02 Mar 2018 15:10)  HR: 58 (02 Mar 2018 15:10) (58 - 58)  BP: 132/56 (02 Mar 2018 15:10) (132/56 - 132/56)  BP(mean): --  RR: 16 (02 Mar 2018 15:10) (16 - 16)  SpO2: 96% (02 Mar 2018 15:10) (96% - 96%)    PHYSICAL EXAM:  GENERAL: NAD, well-groomed. Elderly cantonese speaking man,  phone by bedside. Laying supine, 1 pillow. Good spirits. No glasses or hearing aids.   HEAD:  Atraumatic, Normocephalic  EYES: EOMI, conjunctiva and sclera clear  NECK: Supple, No JVD  NERVOUS SYSTEM:  Alert & Oriented X3, Good concentration; Motor Strength 5/5 B/L upper and lower extremities  CHEST/LUNG: Clear to percussion bilaterally; No rales, rhonchi, wheezing  HEART: Regular rate and rhythm; 4/6 systolic murmur at apex w/ radiation to axilla.   ABDOMEN: Soft, Nontender, Nondistended; Bowel sounds present  EXTREMITIES:  2+ Peripheral Pulses, No clubbing, cyanosis, or edema  SKIN: No rashes or lesions    Consultant(s) Notes Reviewed:  [x ] YES  [ ] NO  LABS: none  CAPILLARY BLOOD GLUCOSE      POCT Blood Glucose.: 100 mg/dL (02 Mar 2018 16:49)      RADIOLOGY & ADDITIONAL TESTS:  Imaging Personally Reviewed: none  Care discussed w/ other providers: yes--  Consultant notes reviewed: yes

## 2018-03-02 NOTE — H&P ADULT - ASSESSMENT
84 YO male s/p AUS placement 12/2017, admitted for AUS removal due to erosion.   -Started on Vancomycin and Zosyn   -Medicine consulted, awaiting clearance   -f/u PVR, if needed place monte catheter   -Monitor [place] output    -f/u Labs   -DVT PPX

## 2018-03-02 NOTE — CONSULT NOTE ADULT - PROBLEM SELECTOR RECOMMENDATION 6
- refused mitraclip in past  - currently euvolemic w/ no JVD, crackles or LE edema.   - cont thiazide.

## 2018-03-02 NOTE — CONSULT NOTE ADULT - SUBJECTIVE AND OBJECTIVE BOX
CHIEF COMPLAINT:    HISTORY OF PRESENT ILLNESS:      Allergies    No Known Allergies    Intolerances    	    MEDICATIONS:  aspirin  chewable 81 milliGRAM(s) Oral daily  heparin  Injectable 5000 Unit(s) SubCutaneous every 8 hours  hydrochlorothiazide 12.5 milliGRAM(s) Oral daily  losartan 25 milliGRAM(s) Oral daily    piperacillin/tazobactam IVPB. 3.375 Gram(s) IV Intermittent every 8 hours  vancomycin  IVPB 750 milliGRAM(s) IV Intermittent every 24 hours        docusate sodium 100 milliGRAM(s) Oral two times a day  senna 2 Tablet(s) Oral at bedtime    atorvastatin 10 milliGRAM(s) Oral at bedtime  dextrose 50% Injectable 12.5 Gram(s) IV Push once  dextrose 50% Injectable 25 Gram(s) IV Push once  dextrose 50% Injectable 25 Gram(s) IV Push once  dextrose 50% Injectable 12.5 Gram(s) IV Push once  dextrose 50% Injectable 25 Gram(s) IV Push once  dextrose 50% Injectable 25 Gram(s) IV Push once  dextrose Gel 1 Dose(s) Oral once PRN  dextrose Gel 1 Dose(s) Oral once PRN  glucagon  Injectable 1 milliGRAM(s) IntraMuscular once PRN  glucagon  Injectable 1 milliGRAM(s) IntraMuscular once PRN  insulin lispro (HumaLOG) corrective regimen sliding scale   SubCutaneous three times a day before meals  insulin lispro (HumaLOG) corrective regimen sliding scale   SubCutaneous at bedtime    dextrose 5%. 1000 milliLiter(s) IV Continuous <Continuous>  dextrose 5%. 1000 milliLiter(s) IV Continuous <Continuous>      PAST MEDICAL & SURGICAL HISTORY:  Dementia  Prostate CA  Diabetes mellitus: no meds needed now, recent wt loss  Urinary incontinence  Hyperlipidemia  Hypertension  DM (diabetes mellitus)  S/P cystoscopy: x2  H/O prostatectomy      FAMILY HISTORY:  No pertinent family history in first degree relatives  Family history of hypertension in son (Child)      SOCIAL HISTORY:    [ ] Non-smoker  [ ] Smoker  [ ] Alcohol      REVIEW OF SYSTEMS:  General: no fatigue/malaise, weight loss/gain.  Skin: no rashes.  Ophthalmologic: no blurred vision, no loss of vision. 	  ENT: no sore throat, rhinorrhea, sinus congestion.  Respiratory: no SOB, cough or wheeze.  Gastrointestinal:  no N/V/D, no melena/hematemesis/hematochezia.  Genitourinary: no dysuria/hesitancy or hematuria.  Musculoskeletal: no myalgias or arthralgias.  Neurological: no changes in vision or hearing, no lightheadedness/dizziness, no syncope/near syncope	  Psychiatric: no unusual stress/anxiety.   Hematology/Lymphatics: no unusual bleeding, bruising and no lymphadenopathy.  Endocrine: no unusual thirst.   All others negative except as stated above and in HPI.    PHYSICAL EXAM:  T(C): 37 (03-02-18 @ 19:18), Max: 37 (03-02-18 @ 19:18)  HR: 77 (03-02-18 @ 19:18) (58 - 77)  BP: 122/67 (03-02-18 @ 19:18) (122/67 - 132/56)  RR: 18 (03-02-18 @ 19:18) (16 - 18)  SpO2: 97% (03-02-18 @ 19:18) (96% - 97%)  Wt(kg): --  I&O's Summary    02 Mar 2018 07:01  -  02 Mar 2018 21:21  --------------------------------------------------------  IN: 160 mL / OUT: 200 mL / NET: -40 mL        Appearance: Normal	  HEENT:   Normal oral mucosa, PERRL, EOMI	  Lymphatic: No lymphadenopathy  Cardiovascular: Normal S1 S2, No JVD, No murmurs, No edema  Respiratory: Lungs clear to auscultation	  Psychiatry: A & O x 3, Mood & affect appropriate  Gastrointestinal:  Soft, Non-tender, + BS	  Skin: No rashes, No ecchymoses, No cyanosis	  Neurologic: Non-focal  Extremities: Normal range of motion, No clubbing, cyanosis or edema  Vascular: Peripheral pulses palpable 2+ bilaterally        LABS:	 	    CBC Full  -  ( 02 Mar 2018 17:59 )  WBC Count : 7.2 K/uL  Hemoglobin : 13.7 g/dL  Hematocrit : 39.6 %  Platelet Count - Automated : 249 K/uL  Mean Cell Volume : 93.1 fl  Mean Cell Hemoglobin : 32.1 pg  Mean Cell Hemoglobin Concentration : 34.5 gm/dL  Auto Neutrophil # : 3.9 K/uL  Auto Lymphocyte # : 2.6 K/uL  Auto Monocyte # : 0.6 K/uL  Auto Eosinophil # : 0.1 K/uL  Auto Basophil # : 0.1 K/uL  Auto Neutrophil % : 54.5 %  Auto Lymphocyte % : 35.5 %  Auto Monocyte % : 7.7 %  Auto Eosinophil % : 1.5 %  Auto Basophil % : 0.9 %    03-02    139  |  98  |  27<H>  ----------------------------<  98  3.7   |  27  |  0.94    Ca    9.9      02 Mar 2018 17:59        proBNP:   Lipid Profile:   HgA1c:   TSH:       CARDIAC MARKERS:            TELEMETRY: 	    ECG:  	  RADIOLOGY:  OTHER: 	    PREVIOUS DIAGNOSTIC TESTING:    [ ] Echocardiogram:    < from: Transthoracic Echocardiogram (07.05.17 @ 21:18) >  Conclusions:  1. 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 This is suspicous for vegetation  in the appropriate clinical setting.  At least moderate,  eccentric mitral regurgitation (may be underestimated)  2. Calcified trileaflet aortic valve withnormal opening.  Mild-moderate aortic regurgitation.  3. Normal left ventricular systolic function. No segmental  wall motion abnormalities.  4. Normal right ventricular size and function.  *** No previous Echo exam. Dr. Richter notified of  findings at 1130 AM on 7/5/17. CARL could better evaluate  the mitral and aortic valves if clinically indicated.    < end of copied text >    [ ]  Catheterization:  [ ] Stress Test:  	  	  ASSESSMENT/PLAN: CHIEF COMPLAINT:    HISTORY OF PRESENT ILLNESS: The Pt is an 86 y/o man with h/o Prostate Cancer (~ 20 years ago s/p Prostatectomy with AUS),  in x many years then removed (7/17) when he was found to have structural and EP cardiac dz. Replaced 12/17. He is now p/w urinary retention in setting of erosion of AUS, here for removal of AUS.     Discussed w/ patient using Cantonese . Per our conversation and chart review: Mr. Dixon was initially here in 7/17 for removal of eroded AUS. Underwent procedure w/ out complication though was found to have significant cardiac conduction dz w/ intermittent CHB + severe MR as above. Seen by EP, cardiology and structural heart who recc'd PPM, r/o infective endocarditis and mitraclip, respectively. Patient refused PPM and mitraclip. He more recently underwent AUS placement in 12/17, proceeded w/ out complication.       Allergies    No Known Allergies    Intolerances    	    MEDICATIONS:  aspirin  chewable 81 milliGRAM(s) Oral daily  heparin  Injectable 5000 Unit(s) SubCutaneous every 8 hours  hydrochlorothiazide 12.5 milliGRAM(s) Oral daily  losartan 25 milliGRAM(s) Oral daily    piperacillin/tazobactam IVPB. 3.375 Gram(s) IV Intermittent every 8 hours  vancomycin  IVPB 750 milliGRAM(s) IV Intermittent every 24 hours        docusate sodium 100 milliGRAM(s) Oral two times a day  senna 2 Tablet(s) Oral at bedtime    atorvastatin 10 milliGRAM(s) Oral at bedtime  dextrose 50% Injectable 12.5 Gram(s) IV Push once  dextrose 50% Injectable 25 Gram(s) IV Push once  dextrose 50% Injectable 25 Gram(s) IV Push once  dextrose 50% Injectable 12.5 Gram(s) IV Push once  dextrose 50% Injectable 25 Gram(s) IV Push once  dextrose 50% Injectable 25 Gram(s) IV Push once  dextrose Gel 1 Dose(s) Oral once PRN  dextrose Gel 1 Dose(s) Oral once PRN  glucagon  Injectable 1 milliGRAM(s) IntraMuscular once PRN  glucagon  Injectable 1 milliGRAM(s) IntraMuscular once PRN  insulin lispro (HumaLOG) corrective regimen sliding scale   SubCutaneous three times a day before meals  insulin lispro (HumaLOG) corrective regimen sliding scale   SubCutaneous at bedtime    dextrose 5%. 1000 milliLiter(s) IV Continuous <Continuous>  dextrose 5%. 1000 milliLiter(s) IV Continuous <Continuous>      PAST MEDICAL & SURGICAL HISTORY:  Dementia  Prostate CA  Diabetes mellitus: no meds needed now, recent wt loss  Urinary incontinence  Hyperlipidemia  Hypertension  DM (diabetes mellitus)  S/P cystoscopy: x2  H/O prostatectomy      FAMILY HISTORY:  No pertinent family history in first degree relatives  Family history of hypertension in son (Child)      SOCIAL HISTORY:    [ ] Non-smoker  [ ] Smoker  [ ] Alcohol      REVIEW OF SYSTEMS:    PHYSICAL EXAM:  T(C): 37 (03-02-18 @ 19:18), Max: 37 (03-02-18 @ 19:18)  HR: 77 (03-02-18 @ 19:18) (58 - 77)  BP: 122/67 (03-02-18 @ 19:18) (122/67 - 132/56)  RR: 18 (03-02-18 @ 19:18) (16 - 18)  SpO2: 97% (03-02-18 @ 19:18) (96% - 97%)  Wt(kg): --  I&O's Summary    02 Mar 2018 07:01  -  02 Mar 2018 21:21  --------------------------------------------------------  IN: 160 mL / OUT: 200 mL / NET: -40 mL        Appearance: Normal	  HEENT:   Normal oral mucosa, PERRL, EOMI	  Lymphatic: No lymphadenopathy  Cardiovascular: Normal S1 S2, No JVD, No murmurs, No edema  Respiratory: Lungs clear to auscultation	  Psychiatry: A & O x 3, Mood & affect appropriate  Gastrointestinal:  Soft, Non-tender, + BS	  Skin: No rashes, No ecchymoses, No cyanosis	  Neurologic: Non-focal  Extremities: Normal range of motion, No clubbing, cyanosis or edema  Vascular: Peripheral pulses palpable 2+ bilaterally        LABS:	 	    CBC Full  -  ( 02 Mar 2018 17:59 )  WBC Count : 7.2 K/uL  Hemoglobin : 13.7 g/dL  Hematocrit : 39.6 %  Platelet Count - Automated : 249 K/uL  Mean Cell Volume : 93.1 fl  Mean Cell Hemoglobin : 32.1 pg  Mean Cell Hemoglobin Concentration : 34.5 gm/dL  Auto Neutrophil # : 3.9 K/uL  Auto Lymphocyte # : 2.6 K/uL  Auto Monocyte # : 0.6 K/uL  Auto Eosinophil # : 0.1 K/uL  Auto Basophil # : 0.1 K/uL  Auto Neutrophil % : 54.5 %  Auto Lymphocyte % : 35.5 %  Auto Monocyte % : 7.7 %  Auto Eosinophil % : 1.5 %  Auto Basophil % : 0.9 %    03-02    139  |  98  |  27<H>  ----------------------------<  98  3.7   |  27  |  0.94    Ca    9.9      02 Mar 2018 17:59        proBNP:   Lipid Profile:   HgA1c:   TSH:       CARDIAC MARKERS:            TELEMETRY: 	    ECG:  	  RADIOLOGY:  OTHER: 	    PREVIOUS DIAGNOSTIC TESTING:      Echocardiogram:    < from: Transthoracic Echocardiogram (07.05.17 @ 21:18) >  Conclusions:  1. 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 This is suspicous for vegetation  in the appropriate clinical setting.  At least moderate,  eccentric mitral regurgitation (may be underestimated)  2. Calcified trileaflet aortic valve withnormal opening.  Mild-moderate aortic regurgitation.  3. Normal left ventricular systolic function. No segmental  wall motion abnormalities.  4. Normal right ventricular size and function.  *** No previous Echo exam. Dr. Richter notified of  findings at 1130 AM on 7/5/17. CARL could better evaluate  the mitral and aortic valves if clinically indicated.    < from: Transesophageal Echocardiogram w/o TTE (07.10.17 @ 09:20) >  Conclusions:  1. Mitral valve prolapse involving the anterior mitral  leaflet. The A2 scallop of the anterior leaflet is  partially flail. A linear mobile echodensity consistent  with torn chordae is seen attached to the tip of the A2  scallop. Severe, eccentric, posteriorly-directed mitral  regurgitation.  2. Calcified trileaflet aortic valve with normal opening.  Moderate aortic regurgitation. Vena contractaabout 0.4-0.5  cm.  3. Normal aortic root and ascending aorta. Simple atheroma  noted in the aortic arch and descending aorta.  4. Left atrial enlargement. No left atrial or left atrial  appendage thrombus. Normal left atrial appendage function  (velocity>60 cm/s).  5. Normal left ventricular systolic function. No segmental  wall motion abnormalities.  6. Normal right ventricular size and function.  7. Agitated saline injection and color flow Doppler  demonstrate no evidence ofa patent foramen ovale.  No definite vegetations seen. Partially flail A2 scallop of  the mitral valve. If clinical suspicion for endocarditis  remains high, consider repeat CARL in approximately 7-10  days.    	  ASSESSMENT/PLAN: CHIEF COMPLAINT:    HISTORY OF PRESENT ILLNESS: The Pt is an 86 y/o man with h/o Prostate Cancer (~ 20 years ago s/p Prostatectomy with AUS, removed in 7/2017, then replaced in 12/2017), Dementia, Intermittent CHB who presented with Urinary Retention and AUS removal. In July 2017, he was noted to have intermittent CHB and PPM was implantation was recommended at that time. However, the patient and his family opted against PPM implantation. He was also evaluated for MitraClip, but opted against further consideration of this procedure also. EP consulted to discuss PPM implantation with family again in the setting of need for additional procedures related to AUS.       Allergies    No Known Allergies    Intolerances    	    MEDICATIONS:  aspirin  chewable 81 milliGRAM(s) Oral daily  heparin  Injectable 5000 Unit(s) SubCutaneous every 8 hours  hydrochlorothiazide 12.5 milliGRAM(s) Oral daily  losartan 25 milliGRAM(s) Oral daily    piperacillin/tazobactam IVPB. 3.375 Gram(s) IV Intermittent every 8 hours  vancomycin  IVPB 750 milliGRAM(s) IV Intermittent every 24 hours        docusate sodium 100 milliGRAM(s) Oral two times a day  senna 2 Tablet(s) Oral at bedtime    atorvastatin 10 milliGRAM(s) Oral at bedtime  dextrose 50% Injectable 12.5 Gram(s) IV Push once  dextrose 50% Injectable 25 Gram(s) IV Push once  dextrose 50% Injectable 25 Gram(s) IV Push once  dextrose 50% Injectable 12.5 Gram(s) IV Push once  dextrose 50% Injectable 25 Gram(s) IV Push once  dextrose 50% Injectable 25 Gram(s) IV Push once  dextrose Gel 1 Dose(s) Oral once PRN  dextrose Gel 1 Dose(s) Oral once PRN  glucagon  Injectable 1 milliGRAM(s) IntraMuscular once PRN  glucagon  Injectable 1 milliGRAM(s) IntraMuscular once PRN  insulin lispro (HumaLOG) corrective regimen sliding scale   SubCutaneous three times a day before meals  insulin lispro (HumaLOG) corrective regimen sliding scale   SubCutaneous at bedtime    dextrose 5%. 1000 milliLiter(s) IV Continuous <Continuous>  dextrose 5%. 1000 milliLiter(s) IV Continuous <Continuous>      PAST MEDICAL & SURGICAL HISTORY:  Dementia  Prostate CA  Diabetes mellitus: no meds needed now, recent wt loss  Urinary incontinence  Hyperlipidemia  Hypertension  DM (diabetes mellitus)  S/P cystoscopy: x2  H/O prostatectomy      FAMILY HISTORY:  No pertinent family history in first degree relatives  Family history of hypertension in son (Child)      SOCIAL HISTORY: Non-smoker        REVIEW OF SYSTEMS: Unable to obtain due to Language Barrier/Dementia.     PHYSICAL EXAM:  T(C): 37 (03-02-18 @ 19:18), Max: 37 (03-02-18 @ 19:18)  HR: 77 (03-02-18 @ 19:18) (58 - 77)  BP: 122/67 (03-02-18 @ 19:18) (122/67 - 132/56)  RR: 18 (03-02-18 @ 19:18) (16 - 18)  SpO2: 97% (03-02-18 @ 19:18) (96% - 97%)  Wt(kg): --  I&O's Summary    02 Mar 2018 07:01  -  02 Mar 2018 21:21  --------------------------------------------------------  IN: 160 mL / OUT: 200 mL / NET: -40 mL        Appearance: Normal	  HEENT:   Normal oral mucosa, PERRL, EOMI	  Lymphatic: No lymphadenopathy  Cardiovascular: Normal S1 S2, No JVD, No murmurs, No edema  Respiratory: Lungs clear to auscultation	  Psychiatry: A & O x 3, Mood & affect appropriate  Gastrointestinal:  Soft, Non-tender, + BS	  Skin: No rashes, No ecchymoses, No cyanosis	  Neurologic: Non-focal  Extremities: Normal range of motion, No clubbing, cyanosis or edema  Vascular: Peripheral pulses palpable 2+ bilaterally        LABS:	 	    CBC Full  -  ( 02 Mar 2018 17:59 )  WBC Count : 7.2 K/uL  Hemoglobin : 13.7 g/dL  Hematocrit : 39.6 %  Platelet Count - Automated : 249 K/uL  Mean Cell Volume : 93.1 fl  Mean Cell Hemoglobin : 32.1 pg  Mean Cell Hemoglobin Concentration : 34.5 gm/dL  Auto Neutrophil # : 3.9 K/uL  Auto Lymphocyte # : 2.6 K/uL  Auto Monocyte # : 0.6 K/uL  Auto Eosinophil # : 0.1 K/uL  Auto Basophil # : 0.1 K/uL  Auto Neutrophil % : 54.5 %  Auto Lymphocyte % : 35.5 %  Auto Monocyte % : 7.7 %  Auto Eosinophil % : 1.5 %  Auto Basophil % : 0.9 %    03-02    139  |  98  |  27<H>  ----------------------------<  98  3.7   |  27  |  0.94    Ca    9.9      02 Mar 2018 17:59        proBNP:   Lipid Profile:   HgA1c:   TSH:       CARDIAC MARKERS:            TELEMETRY: 	    ECG:  	  RADIOLOGY:  OTHER: 	    PREVIOUS DIAGNOSTIC TESTING:      Echocardiogram:    < from: Transthoracic Echocardiogram (07.05.17 @ 21:18) >  Conclusions:  1. 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 This is suspicous for vegetation  in the appropriate clinical setting.  At least moderate,  eccentric mitral regurgitation (may be underestimated)  2. Calcified trileaflet aortic valve withnormal opening.  Mild-moderate aortic regurgitation.  3. Normal left ventricular systolic function. No segmental  wall motion abnormalities.  4. Normal right ventricular size and function.  *** No previous Echo exam. Dr. Richter notified of  findings at 1130 AM on 7/5/17. CARL could better evaluate  the mitral and aortic valves if clinically indicated.    < from: Transesophageal Echocardiogram w/o TTE (07.10.17 @ 09:20) >  Conclusions:  1. Mitral valve prolapse involving the anterior mitral  leaflet. The A2 scallop of the anterior leaflet is  partially flail. A linear mobile echodensity consistent  with torn chordae is seen attached to the tip of the A2  scallop. Severe, eccentric, posteriorly-directed mitral  regurgitation.  2. Calcified trileaflet aortic valve with normal opening.  Moderate aortic regurgitation. Vena contractaabout 0.4-0.5  cm.  3. Normal aortic root and ascending aorta. Simple atheroma  noted in the aortic arch and descending aorta.  4. Left atrial enlargement. No left atrial or left atrial  appendage thrombus. Normal left atrial appendage function  (velocity>60 cm/s).  5. Normal left ventricular systolic function. No segmental  wall motion abnormalities.  6. Normal right ventricular size and function.  7. Agitated saline injection and color flow Doppler  demonstrate no evidence ofa patent foramen ovale.  No definite vegetations seen. Partially flail A2 scallop of  the mitral valve. If clinical suspicion for endocarditis  remains high, consider repeat CARL in approximately 7-10  days.    	  ASSESSMENT/PLAN: CHIEF COMPLAINT:    HISTORY OF PRESENT ILLNESS: The Pt is an 84 y/o man with h/o Prostate Cancer (~ 20 years ago s/p Prostatectomy with AUS, removed in 7/2017, then replaced in 12/2017), Dementia, Intermittent CHB who presented with Urinary Retention and AUS removal. In July 2017, he was noted to have intermittent CHB and PPM was implantation was recommended at that time. However, the patient and his family opted against PPM implantation. He was also evaluated for MitraClip, but opted against further consideration of this procedure also. EP consulted to discuss PPM implantation with family again in the setting of need for additional procedures related to AUS.       Allergies    No Known Allergies    Intolerances    	    MEDICATIONS:  aspirin  chewable 81 milliGRAM(s) Oral daily  heparin  Injectable 5000 Unit(s) SubCutaneous every 8 hours  hydrochlorothiazide 12.5 milliGRAM(s) Oral daily  losartan 25 milliGRAM(s) Oral daily    piperacillin/tazobactam IVPB. 3.375 Gram(s) IV Intermittent every 8 hours  vancomycin  IVPB 750 milliGRAM(s) IV Intermittent every 24 hours        docusate sodium 100 milliGRAM(s) Oral two times a day  senna 2 Tablet(s) Oral at bedtime    atorvastatin 10 milliGRAM(s) Oral at bedtime  dextrose 50% Injectable 12.5 Gram(s) IV Push once  dextrose 50% Injectable 25 Gram(s) IV Push once  dextrose 50% Injectable 25 Gram(s) IV Push once  dextrose 50% Injectable 12.5 Gram(s) IV Push once  dextrose 50% Injectable 25 Gram(s) IV Push once  dextrose 50% Injectable 25 Gram(s) IV Push once  dextrose Gel 1 Dose(s) Oral once PRN  dextrose Gel 1 Dose(s) Oral once PRN  glucagon  Injectable 1 milliGRAM(s) IntraMuscular once PRN  glucagon  Injectable 1 milliGRAM(s) IntraMuscular once PRN  insulin lispro (HumaLOG) corrective regimen sliding scale   SubCutaneous three times a day before meals  insulin lispro (HumaLOG) corrective regimen sliding scale   SubCutaneous at bedtime    dextrose 5%. 1000 milliLiter(s) IV Continuous <Continuous>  dextrose 5%. 1000 milliLiter(s) IV Continuous <Continuous>      PAST MEDICAL & SURGICAL HISTORY:  Dementia  Prostate CA  Diabetes mellitus: no meds needed now, recent wt loss  Urinary incontinence  Hyperlipidemia  Hypertension  DM (diabetes mellitus)  S/P cystoscopy: x2  H/O prostatectomy      FAMILY HISTORY:  No pertinent family history in first degree relatives  Family history of hypertension in son (Child)      SOCIAL HISTORY: Non-smoker        REVIEW OF SYSTEMS: Unable to obtain due to Language Barrier/Dementia.     PHYSICAL EXAM:  T(C): 37 (03-02-18 @ 19:18), Max: 37 (03-02-18 @ 19:18)  HR: 77 (03-02-18 @ 19:18) (58 - 77)  BP: 122/67 (03-02-18 @ 19:18) (122/67 - 132/56)  RR: 18 (03-02-18 @ 19:18) (16 - 18)  SpO2: 97% (03-02-18 @ 19:18) (96% - 97%)  Wt(kg): --  I&O's Summary    02 Mar 2018 07:01  -  02 Mar 2018 21:21  --------------------------------------------------------  IN: 160 mL / OUT: 200 mL / NET: -40 mL        Appearance: Normal	  HEENT:   Normal oral mucosa, PERRL, EOMI	  Cardiovascular: regular, javid  Respiratory: Lungs clear to auscultation	  Psychiatry: Mood & affect appropriate  Gastrointestinal:  Soft, Non-tender, + BS	  Skin: No rashes, No ecchymoses, No cyanosis	  Neurologic: Non-focal  Extremities: Normal range of motion, No clubbing, cyanosis or edema        LABS:	 	    CBC Full  -  ( 02 Mar 2018 17:59 )  WBC Count : 7.2 K/uL  Hemoglobin : 13.7 g/dL  Hematocrit : 39.6 %  Platelet Count - Automated : 249 K/uL  Mean Cell Volume : 93.1 fl  Mean Cell Hemoglobin : 32.1 pg  Mean Cell Hemoglobin Concentration : 34.5 gm/dL  Auto Neutrophil # : 3.9 K/uL  Auto Lymphocyte # : 2.6 K/uL  Auto Monocyte # : 0.6 K/uL  Auto Eosinophil # : 0.1 K/uL  Auto Basophil # : 0.1 K/uL  Auto Neutrophil % : 54.5 %  Auto Lymphocyte % : 35.5 %  Auto Monocyte % : 7.7 %  Auto Eosinophil % : 1.5 %  Auto Basophil % : 0.9 %    03-02    139  |  98  |  27<H>  ----------------------------<  98  3.7   |  27  |  0.94    Ca    9.9      02 Mar 2018 17:59    TELEMETRY:  	      PREVIOUS DIAGNOSTIC TESTING:      Echocardiogram:    < from: Transthoracic Echocardiogram (07.05.17 @ 21:18) >  Conclusions:  1. 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 This is suspicous for vegetation  in the appropriate clinical setting.  At least moderate,  eccentric mitral regurgitation (may be underestimated)  2. Calcified trileaflet aortic valve withnormal opening.  Mild-moderate aortic regurgitation.  3. Normal left ventricular systolic function. No segmental  wall motion abnormalities.  4. Normal right ventricular size and function.  *** No previous Echo exam. Dr. Richter notified of  findings at 1130 AM on 7/5/17. CARL could better evaluate  the mitral and aortic valves if clinically indicated.    < from: Transesophageal Echocardiogram w/o TTE (07.10.17 @ 09:20) >  Conclusions:  1. Mitral valve prolapse involving the anterior mitral  leaflet. The A2 scallop of the anterior leaflet is  partially flail. A linear mobile echodensity consistent  with torn chordae is seen attached to the tip of the A2  scallop. Severe, eccentric, posteriorly-directed mitral  regurgitation.  2. Calcified trileaflet aortic valve with normal opening.  Moderate aortic regurgitation. Vena contractaabout 0.4-0.5  cm.  3. Normal aortic root and ascending aorta. Simple atheroma  noted in the aortic arch and descending aorta.  4. Left atrial enlargement. No left atrial or left atrial  appendage thrombus. Normal left atrial appendage function  (velocity>60 cm/s).  5. Normal left ventricular systolic function. No segmental  wall motion abnormalities.  6. Normal right ventricular size and function.  7. Agitated saline injection and color flow Doppler  demonstrate no evidence ofa patent foramen ovale.  No definite vegetations seen. Partially flail A2 scallop of  the mitral valve. If clinical suspicion for endocarditis  remains high, consider repeat CARL in approximately 7-10  days.    	  ASSESSMENT/PLAN: CHIEF COMPLAINT:    HISTORY OF PRESENT ILLNESS: The Pt is an 84 y/o man with h/o Prostate Cancer (~ 20 years ago s/p Prostatectomy with AUS, removed in 7/2017, then replaced in 12/2017), Dementia, Intermittent CHB who presented with Urinary Retention and AUS removal. In July 2017, he was noted to have intermittent CHB and PPM was implantation was recommended at that time. However, the patient and his family opted against PPM implantation. He was also evaluated for MitraClip, but opted against further consideration of this procedure also. EP consulted to discuss PPM implantation with family again in the setting of need for additional procedures related to AUS.       Allergies    No Known Allergies    Intolerances    	    MEDICATIONS:  aspirin  chewable 81 milliGRAM(s) Oral daily  heparin  Injectable 5000 Unit(s) SubCutaneous every 8 hours  hydrochlorothiazide 12.5 milliGRAM(s) Oral daily  losartan 25 milliGRAM(s) Oral daily    piperacillin/tazobactam IVPB. 3.375 Gram(s) IV Intermittent every 8 hours  vancomycin  IVPB 750 milliGRAM(s) IV Intermittent every 24 hours        docusate sodium 100 milliGRAM(s) Oral two times a day  senna 2 Tablet(s) Oral at bedtime    atorvastatin 10 milliGRAM(s) Oral at bedtime  dextrose 50% Injectable 12.5 Gram(s) IV Push once  dextrose 50% Injectable 25 Gram(s) IV Push once  dextrose 50% Injectable 25 Gram(s) IV Push once  dextrose 50% Injectable 12.5 Gram(s) IV Push once  dextrose 50% Injectable 25 Gram(s) IV Push once  dextrose 50% Injectable 25 Gram(s) IV Push once  dextrose Gel 1 Dose(s) Oral once PRN  dextrose Gel 1 Dose(s) Oral once PRN  glucagon  Injectable 1 milliGRAM(s) IntraMuscular once PRN  glucagon  Injectable 1 milliGRAM(s) IntraMuscular once PRN  insulin lispro (HumaLOG) corrective regimen sliding scale   SubCutaneous three times a day before meals  insulin lispro (HumaLOG) corrective regimen sliding scale   SubCutaneous at bedtime    dextrose 5%. 1000 milliLiter(s) IV Continuous <Continuous>  dextrose 5%. 1000 milliLiter(s) IV Continuous <Continuous>      PAST MEDICAL & SURGICAL HISTORY:  Dementia  Prostate CA  Diabetes mellitus: no meds needed now, recent wt loss  Urinary incontinence  Hyperlipidemia  Hypertension  DM (diabetes mellitus)  S/P cystoscopy: x2  H/O prostatectomy      FAMILY HISTORY:  No pertinent family history in first degree relatives  Family history of hypertension in son (Child)      SOCIAL HISTORY: Non-smoker        REVIEW OF SYSTEMS: Unable to obtain due to Language Barrier/Dementia.     PHYSICAL EXAM:  T(C): 37 (03-02-18 @ 19:18), Max: 37 (03-02-18 @ 19:18)  HR: 77 (03-02-18 @ 19:18) (58 - 77)  BP: 122/67 (03-02-18 @ 19:18) (122/67 - 132/56)  RR: 18 (03-02-18 @ 19:18) (16 - 18)  SpO2: 97% (03-02-18 @ 19:18) (96% - 97%)  Wt(kg): --  I&O's Summary    02 Mar 2018 07:01  -  02 Mar 2018 21:21  --------------------------------------------------------  IN: 160 mL / OUT: 200 mL / NET: -40 mL        Appearance: Normal	  HEENT:   Normal oral mucosa, PERRL, EOMI	  Cardiovascular: regular, javid  Respiratory: Lungs clear to auscultation	  Psychiatry: Mood & affect appropriate  Gastrointestinal:  Soft, Non-tender, + BS	  Skin: No rashes, No ecchymoses, No cyanosis	  Neurologic: Non-focal  Extremities: Normal range of motion, No clubbing, cyanosis or edema        LABS:	 	    CBC Full  -  ( 02 Mar 2018 17:59 )  WBC Count : 7.2 K/uL  Hemoglobin : 13.7 g/dL  Hematocrit : 39.6 %  Platelet Count - Automated : 249 K/uL  Mean Cell Volume : 93.1 fl  Mean Cell Hemoglobin : 32.1 pg  Mean Cell Hemoglobin Concentration : 34.5 gm/dL  Auto Neutrophil # : 3.9 K/uL  Auto Lymphocyte # : 2.6 K/uL  Auto Monocyte # : 0.6 K/uL  Auto Eosinophil # : 0.1 K/uL  Auto Basophil # : 0.1 K/uL  Auto Neutrophil % : 54.5 %  Auto Lymphocyte % : 35.5 %  Auto Monocyte % : 7.7 %  Auto Eosinophil % : 1.5 %  Auto Basophil % : 0.9 %    03-02    139  |  98  |  27<H>  ----------------------------<  98  3.7   |  27  |  0.94    Ca    9.9      02 Mar 2018 17:59    TELEMETRY: Sinus Rhythm in 40s - 60s with Mobitz Type I AV Block  	      PREVIOUS DIAGNOSTIC TESTING:      Echocardiogram:    < from: Transthoracic Echocardiogram (07.05.17 @ 21:18) >  Conclusions:  1. 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 This is suspicous for vegetation  in the appropriate clinical setting.  At least moderate,  eccentric mitral regurgitation (may be underestimated)  2. Calcified trileaflet aortic valve withnormal opening.  Mild-moderate aortic regurgitation.  3. Normal left ventricular systolic function. No segmental  wall motion abnormalities.  4. Normal right ventricular size and function.  *** No previous Echo exam. Dr. Richter notified of  findings at 1130 AM on 7/5/17. CARL could better evaluate  the mitral and aortic valves if clinically indicated.    < from: Transesophageal Echocardiogram w/o TTE (07.10.17 @ 09:20) >  Conclusions:  1. Mitral valve prolapse involving the anterior mitral  leaflet. The A2 scallop of the anterior leaflet is  partially flail. A linear mobile echodensity consistent  with torn chordae is seen attached to the tip of the A2  scallop. Severe, eccentric, posteriorly-directed mitral  regurgitation.  2. Calcified trileaflet aortic valve with normal opening.  Moderate aortic regurgitation. Vena contractaabout 0.4-0.5  cm.  3. Normal aortic root and ascending aorta. Simple atheroma  noted in the aortic arch and descending aorta.  4. Left atrial enlargement. No left atrial or left atrial  appendage thrombus. Normal left atrial appendage function  (velocity>60 cm/s).  5. Normal left ventricular systolic function. No segmental  wall motion abnormalities.  6. Normal right ventricular size and function.  7. Agitated saline injection and color flow Doppler  demonstrate no evidence ofa patent foramen ovale.  No definite vegetations seen. Partially flail A2 scallop of  the mitral valve. If clinical suspicion for endocarditis  remains high, consider repeat CARL in approximately 7-10  days.    	  ASSESSMENT/PLAN: 84 y/o man with h/o Prostate Cancer (~ 20 years ago s/p Prostatectomy with AUS, removed in 7/2017, then replaced in 12/2017), Dementia, Intermittent CHB who presented with Urinary Retention and AUS removal in setting of h/o CHIEF COMPLAINT:    HISTORY OF PRESENT ILLNESS: The Pt is an 84 y/o man with h/o Prostate Cancer (~ 20 years ago s/p Prostatectomy with AUS, removed in 7/2017, then replaced in 12/2017), Dementia, Intermittent CHB who presented with Urinary Retention and AUS removal. In July 2017, he was noted to have intermittent CHB and PPM was implantation was recommended at that time. However, the patient and his family opted against PPM implantation. He was also evaluated for MitraClip, but opted against further consideration of this procedure also. EP consulted to discuss PPM implantation with family again in the setting of need for additional procedures related to AUS.       Allergies    No Known Allergies    Intolerances    	    MEDICATIONS:  aspirin  chewable 81 milliGRAM(s) Oral daily  heparin  Injectable 5000 Unit(s) SubCutaneous every 8 hours  hydrochlorothiazide 12.5 milliGRAM(s) Oral daily  losartan 25 milliGRAM(s) Oral daily    piperacillin/tazobactam IVPB. 3.375 Gram(s) IV Intermittent every 8 hours  vancomycin  IVPB 750 milliGRAM(s) IV Intermittent every 24 hours        docusate sodium 100 milliGRAM(s) Oral two times a day  senna 2 Tablet(s) Oral at bedtime    atorvastatin 10 milliGRAM(s) Oral at bedtime  dextrose 50% Injectable 12.5 Gram(s) IV Push once  dextrose 50% Injectable 25 Gram(s) IV Push once  dextrose 50% Injectable 25 Gram(s) IV Push once  dextrose 50% Injectable 12.5 Gram(s) IV Push once  dextrose 50% Injectable 25 Gram(s) IV Push once  dextrose 50% Injectable 25 Gram(s) IV Push once  dextrose Gel 1 Dose(s) Oral once PRN  dextrose Gel 1 Dose(s) Oral once PRN  glucagon  Injectable 1 milliGRAM(s) IntraMuscular once PRN  glucagon  Injectable 1 milliGRAM(s) IntraMuscular once PRN  insulin lispro (HumaLOG) corrective regimen sliding scale   SubCutaneous three times a day before meals  insulin lispro (HumaLOG) corrective regimen sliding scale   SubCutaneous at bedtime    dextrose 5%. 1000 milliLiter(s) IV Continuous <Continuous>  dextrose 5%. 1000 milliLiter(s) IV Continuous <Continuous>      PAST MEDICAL & SURGICAL HISTORY:  Dementia  Prostate CA  Diabetes mellitus: no meds needed now, recent wt loss  Urinary incontinence  Hyperlipidemia  Hypertension  DM (diabetes mellitus)  S/P cystoscopy: x2  H/O prostatectomy      FAMILY HISTORY:  No pertinent family history in first degree relatives  Family history of hypertension in son (Child)      SOCIAL HISTORY: Non-smoker        REVIEW OF SYSTEMS: Unable to obtain due to Language Barrier/Dementia.     PHYSICAL EXAM:  T(C): 37 (03-02-18 @ 19:18), Max: 37 (03-02-18 @ 19:18)  HR: 77 (03-02-18 @ 19:18) (58 - 77)  BP: 122/67 (03-02-18 @ 19:18) (122/67 - 132/56)  RR: 18 (03-02-18 @ 19:18) (16 - 18)  SpO2: 97% (03-02-18 @ 19:18) (96% - 97%)  Wt(kg): --  I&O's Summary    02 Mar 2018 07:01  -  02 Mar 2018 21:21  --------------------------------------------------------  IN: 160 mL / OUT: 200 mL / NET: -40 mL        Appearance: Normal	  HEENT:   Normal oral mucosa, PERRL, EOMI	  Cardiovascular: regular, javid  Respiratory: Lungs clear to auscultation	  Psychiatry: Mood & affect appropriate  Gastrointestinal:  Soft, Non-tender, + BS	  Skin: No rashes, No ecchymoses, No cyanosis	  Neurologic: Non-focal  Extremities: Normal range of motion, No clubbing, cyanosis or edema        LABS:	 	    CBC Full  -  ( 02 Mar 2018 17:59 )  WBC Count : 7.2 K/uL  Hemoglobin : 13.7 g/dL  Hematocrit : 39.6 %  Platelet Count - Automated : 249 K/uL  Mean Cell Volume : 93.1 fl  Mean Cell Hemoglobin : 32.1 pg  Mean Cell Hemoglobin Concentration : 34.5 gm/dL  Auto Neutrophil # : 3.9 K/uL  Auto Lymphocyte # : 2.6 K/uL  Auto Monocyte # : 0.6 K/uL  Auto Eosinophil # : 0.1 K/uL  Auto Basophil # : 0.1 K/uL  Auto Neutrophil % : 54.5 %  Auto Lymphocyte % : 35.5 %  Auto Monocyte % : 7.7 %  Auto Eosinophil % : 1.5 %  Auto Basophil % : 0.9 %    03-02    139  |  98  |  27<H>  ----------------------------<  98  3.7   |  27  |  0.94    Ca    9.9      02 Mar 2018 17:59    TELEMETRY: Sinus Rhythm in 40s - 60s with Mobitz Type I AV Block  	      PREVIOUS DIAGNOSTIC TESTING:      Echocardiogram:    < from: Transthoracic Echocardiogram (07.05.17 @ 21:18) >  Conclusions:  1. 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 This is suspicous for vegetation  in the appropriate clinical setting.  At least moderate,  eccentric mitral regurgitation (may be underestimated)  2. Calcified trileaflet aortic valve withnormal opening.  Mild-moderate aortic regurgitation.  3. Normal left ventricular systolic function. No segmental  wall motion abnormalities.  4. Normal right ventricular size and function.  *** No previous Echo exam. Dr. Richter notified of  findings at 1130 AM on 7/5/17. CARL could better evaluate  the mitral and aortic valves if clinically indicated.    < from: Transesophageal Echocardiogram w/o TTE (07.10.17 @ 09:20) >  Conclusions:  1. Mitral valve prolapse involving the anterior mitral  leaflet. The A2 scallop of the anterior leaflet is  partially flail. A linear mobile echodensity consistent  with torn chordae is seen attached to the tip of the A2  scallop. Severe, eccentric, posteriorly-directed mitral  regurgitation.  2. Calcified trileaflet aortic valve with normal opening.  Moderate aortic regurgitation. Vena contractaabout 0.4-0.5  cm.  3. Normal aortic root and ascending aorta. Simple atheroma  noted in the aortic arch and descending aorta.  4. Left atrial enlargement. No left atrial or left atrial  appendage thrombus. Normal left atrial appendage function  (velocity>60 cm/s).  5. Normal left ventricular systolic function. No segmental  wall motion abnormalities.  6. Normal right ventricular size and function.  7. Agitated saline injection and color flow Doppler  demonstrate no evidence ofa patent foramen ovale.  No definite vegetations seen. Partially flail A2 scallop of  the mitral valve. If clinical suspicion for endocarditis  remains high, consider repeat CARL in approximately 7-10  days.    	  ASSESSMENT/PLAN: 84 y/o man with h/o Prostate Cancer (~ 20 years ago s/p Prostatectomy with AUS, removed in 7/2017, then replaced in 12/2017), Dementia, Intermittent CHB who presented with Urinary Retention and AUS removal in setting of prior Intermittent CHB    1) Intermittent CHB - does not appear to have CHB currently on Tele, but will continue to monitor  - if no evidence CHB, and only asymptomatic Mobitz I is present, there is no current indication for PPM Implantation (which the patient and family have continued to defer)

## 2018-03-02 NOTE — CONSULT NOTE ADULT - ATTENDING COMMENTS
seen and examined with fellow and brought back NP (Hugo Zaman) for translation. I agree with H & P, A & P.  He has advanced conduction disease with bifasicular block and intermittent complete heart block.   Currently appears to be block in the AV node, ie type 1 second degree.  He has previously refused PM because he feels "well".   Still refusing.  He will discuss with his family and if he agrees we may proceed AFTER his urologic procedure.

## 2018-03-03 DIAGNOSIS — E11.9 TYPE 2 DIABETES MELLITUS WITHOUT COMPLICATIONS: ICD-10-CM

## 2018-03-03 DIAGNOSIS — I10 ESSENTIAL (PRIMARY) HYPERTENSION: ICD-10-CM

## 2018-03-03 DIAGNOSIS — R32 UNSPECIFIED URINARY INCONTINENCE: ICD-10-CM

## 2018-03-03 LAB
GLUCOSE BLDC GLUCOMTR-MCNC: 109 MG/DL — HIGH (ref 70–99)
GLUCOSE BLDC GLUCOMTR-MCNC: 131 MG/DL — HIGH (ref 70–99)
GLUCOSE BLDC GLUCOMTR-MCNC: 179 MG/DL — HIGH (ref 70–99)
GLUCOSE BLDC GLUCOMTR-MCNC: 97 MG/DL — SIGNIFICANT CHANGE UP (ref 70–99)
HBA1C BLD-MCNC: 6.1 % — HIGH (ref 4–5.6)

## 2018-03-03 PROCEDURE — 99223 1ST HOSP IP/OBS HIGH 75: CPT | Mod: AI

## 2018-03-03 PROCEDURE — 99233 SBSQ HOSP IP/OBS HIGH 50: CPT

## 2018-03-03 RX ADMIN — ATORVASTATIN CALCIUM 10 MILLIGRAM(S): 80 TABLET, FILM COATED ORAL at 21:24

## 2018-03-03 RX ADMIN — Medication 12.5 MILLIGRAM(S): at 15:03

## 2018-03-03 RX ADMIN — PIPERACILLIN AND TAZOBACTAM 25 GRAM(S): 4; .5 INJECTION, POWDER, LYOPHILIZED, FOR SOLUTION INTRAVENOUS at 06:32

## 2018-03-03 RX ADMIN — PIPERACILLIN AND TAZOBACTAM 25 GRAM(S): 4; .5 INJECTION, POWDER, LYOPHILIZED, FOR SOLUTION INTRAVENOUS at 21:24

## 2018-03-03 RX ADMIN — LOSARTAN POTASSIUM 25 MILLIGRAM(S): 100 TABLET, FILM COATED ORAL at 05:32

## 2018-03-03 RX ADMIN — PIPERACILLIN AND TAZOBACTAM 25 GRAM(S): 4; .5 INJECTION, POWDER, LYOPHILIZED, FOR SOLUTION INTRAVENOUS at 15:08

## 2018-03-03 RX ADMIN — Medication 150 MILLIGRAM(S): at 20:14

## 2018-03-03 RX ADMIN — HEPARIN SODIUM 5000 UNIT(S): 5000 INJECTION INTRAVENOUS; SUBCUTANEOUS at 21:24

## 2018-03-03 RX ADMIN — HEPARIN SODIUM 5000 UNIT(S): 5000 INJECTION INTRAVENOUS; SUBCUTANEOUS at 05:32

## 2018-03-03 RX ADMIN — SENNA PLUS 2 TABLET(S): 8.6 TABLET ORAL at 21:24

## 2018-03-03 RX ADMIN — Medication 100 MILLIGRAM(S): at 05:32

## 2018-03-03 RX ADMIN — Medication 81 MILLIGRAM(S): at 12:15

## 2018-03-03 RX ADMIN — Medication 100 MILLIGRAM(S): at 17:38

## 2018-03-03 RX ADMIN — HEPARIN SODIUM 5000 UNIT(S): 5000 INJECTION INTRAVENOUS; SUBCUTANEOUS at 15:08

## 2018-03-03 NOTE — PROGRESS NOTE ADULT - PROBLEM SELECTOR PLAN 5
a1c 6.1  - c/w ISS and monitor FS TIDACBED  - please order capsaicin 0.025% topical at bedtime for feet and neuropathy type pain.

## 2018-03-03 NOTE — PROVIDER CONTACT NOTE (OTHER) - REASON
Call from tele monitoring person: episode of 2-1 heart block; resolved and back to pt baseline of NSR w/ 1st degree heartblock

## 2018-03-03 NOTE — PROGRESS NOTE ADULT - PROBLEM SELECTOR PLAN 3
RCRI 1 for underlying heart disease. With known hx of heart block, mobitz type 1 refusing PPM.  - c/w medications as above  - seen by EP  - Patient is low risk for moderate risk procedure. Would have pacer pads in place on patient during procedure and follow ACLS protocol for symptomatic bradycardia and call EP if issues during procedure.   - As pt is refusing PPM, may proceed with above procedure without further cardiac testing at this point.

## 2018-03-04 LAB
GLUCOSE BLDC GLUCOMTR-MCNC: 105 MG/DL — HIGH (ref 70–99)
GLUCOSE BLDC GLUCOMTR-MCNC: 183 MG/DL — HIGH (ref 70–99)
GLUCOSE BLDC GLUCOMTR-MCNC: 88 MG/DL — SIGNIFICANT CHANGE UP (ref 70–99)
GLUCOSE BLDC GLUCOMTR-MCNC: 98 MG/DL — SIGNIFICANT CHANGE UP (ref 70–99)
VANCOMYCIN TROUGH SERPL-MCNC: 5.9 UG/ML — LOW (ref 10–20)

## 2018-03-04 RX ORDER — VANCOMYCIN HCL 1 G
750 VIAL (EA) INTRAVENOUS EVERY 12 HOURS
Qty: 0 | Refills: 0 | Status: DISCONTINUED | OUTPATIENT
Start: 2018-03-04 | End: 2018-03-05

## 2018-03-04 RX ADMIN — PIPERACILLIN AND TAZOBACTAM 25 GRAM(S): 4; .5 INJECTION, POWDER, LYOPHILIZED, FOR SOLUTION INTRAVENOUS at 13:09

## 2018-03-04 RX ADMIN — HEPARIN SODIUM 5000 UNIT(S): 5000 INJECTION INTRAVENOUS; SUBCUTANEOUS at 13:09

## 2018-03-04 RX ADMIN — Medication 100 MILLIGRAM(S): at 05:00

## 2018-03-04 RX ADMIN — Medication 100 MILLIGRAM(S): at 18:10

## 2018-03-04 RX ADMIN — HEPARIN SODIUM 5000 UNIT(S): 5000 INJECTION INTRAVENOUS; SUBCUTANEOUS at 21:33

## 2018-03-04 RX ADMIN — PIPERACILLIN AND TAZOBACTAM 25 GRAM(S): 4; .5 INJECTION, POWDER, LYOPHILIZED, FOR SOLUTION INTRAVENOUS at 05:00

## 2018-03-04 RX ADMIN — PIPERACILLIN AND TAZOBACTAM 25 GRAM(S): 4; .5 INJECTION, POWDER, LYOPHILIZED, FOR SOLUTION INTRAVENOUS at 21:33

## 2018-03-04 RX ADMIN — SENNA PLUS 2 TABLET(S): 8.6 TABLET ORAL at 21:33

## 2018-03-04 RX ADMIN — ATORVASTATIN CALCIUM 10 MILLIGRAM(S): 80 TABLET, FILM COATED ORAL at 21:33

## 2018-03-04 RX ADMIN — Medication 12.5 MILLIGRAM(S): at 13:09

## 2018-03-04 RX ADMIN — LOSARTAN POTASSIUM 25 MILLIGRAM(S): 100 TABLET, FILM COATED ORAL at 05:00

## 2018-03-04 RX ADMIN — HEPARIN SODIUM 5000 UNIT(S): 5000 INJECTION INTRAVENOUS; SUBCUTANEOUS at 05:00

## 2018-03-04 RX ADMIN — Medication 81 MILLIGRAM(S): at 13:09

## 2018-03-04 RX ADMIN — Medication 150 MILLIGRAM(S): at 21:16

## 2018-03-05 ENCOUNTER — APPOINTMENT (OUTPATIENT)
Age: 83
End: 2018-03-05

## 2018-03-05 LAB
BACTERIA UR CULT: NORMAL
GLUCOSE BLDC GLUCOMTR-MCNC: 134 MG/DL — HIGH (ref 70–99)
GLUCOSE BLDC GLUCOMTR-MCNC: 184 MG/DL — HIGH (ref 70–99)
GLUCOSE BLDC GLUCOMTR-MCNC: 94 MG/DL — SIGNIFICANT CHANGE UP (ref 70–99)
GLUCOSE BLDC GLUCOMTR-MCNC: 96 MG/DL — SIGNIFICANT CHANGE UP (ref 70–99)
VANCOMYCIN TROUGH SERPL-MCNC: 13.6 UG/ML — SIGNIFICANT CHANGE UP (ref 10–20)

## 2018-03-05 RX ORDER — VANCOMYCIN HCL 1 G
1000 VIAL (EA) INTRAVENOUS EVERY 12 HOURS
Qty: 0 | Refills: 0 | Status: DISCONTINUED | OUTPATIENT
Start: 2018-03-05 | End: 2018-03-07

## 2018-03-05 RX ADMIN — PIPERACILLIN AND TAZOBACTAM 25 GRAM(S): 4; .5 INJECTION, POWDER, LYOPHILIZED, FOR SOLUTION INTRAVENOUS at 06:34

## 2018-03-05 RX ADMIN — Medication 250 MILLIGRAM(S): at 12:13

## 2018-03-05 RX ADMIN — Medication 12.5 MILLIGRAM(S): at 12:14

## 2018-03-05 RX ADMIN — Medication 100 MILLIGRAM(S): at 17:06

## 2018-03-05 RX ADMIN — HEPARIN SODIUM 5000 UNIT(S): 5000 INJECTION INTRAVENOUS; SUBCUTANEOUS at 14:29

## 2018-03-05 RX ADMIN — LOSARTAN POTASSIUM 25 MILLIGRAM(S): 100 TABLET, FILM COATED ORAL at 06:34

## 2018-03-05 RX ADMIN — HEPARIN SODIUM 5000 UNIT(S): 5000 INJECTION INTRAVENOUS; SUBCUTANEOUS at 21:47

## 2018-03-05 RX ADMIN — Medication 81 MILLIGRAM(S): at 12:14

## 2018-03-05 RX ADMIN — HEPARIN SODIUM 5000 UNIT(S): 5000 INJECTION INTRAVENOUS; SUBCUTANEOUS at 06:34

## 2018-03-05 RX ADMIN — Medication 100 MILLIGRAM(S): at 06:34

## 2018-03-05 RX ADMIN — PIPERACILLIN AND TAZOBACTAM 25 GRAM(S): 4; .5 INJECTION, POWDER, LYOPHILIZED, FOR SOLUTION INTRAVENOUS at 21:46

## 2018-03-05 RX ADMIN — ATORVASTATIN CALCIUM 10 MILLIGRAM(S): 80 TABLET, FILM COATED ORAL at 21:47

## 2018-03-05 RX ADMIN — PIPERACILLIN AND TAZOBACTAM 25 GRAM(S): 4; .5 INJECTION, POWDER, LYOPHILIZED, FOR SOLUTION INTRAVENOUS at 14:29

## 2018-03-05 NOTE — PROVIDER CONTACT NOTE (OTHER) - ASSESSMENT
VS stable see flowsheet, pt with no c/o chest pain/SOB/distress VS stable see flowsheet, pt with no c/o chest pain/SOB/palpations and in no apparent distress

## 2018-03-06 DIAGNOSIS — T83.89XD OTHER SPECIFIED COMPLICATION OF GENITOURINARY PROSTHETIC DEVICES, IMPLANTS AND GRAFTS, SUBSEQUENT ENCOUNTER: ICD-10-CM

## 2018-03-06 DIAGNOSIS — T83.111A BREAKDOWN (MECHANICAL) OF IMPLANTED URINARY SPHINCTER, INITIAL ENCOUNTER: ICD-10-CM

## 2018-03-06 DIAGNOSIS — R33.8 OTHER RETENTION OF URINE: ICD-10-CM

## 2018-03-06 LAB
ANION GAP SERPL CALC-SCNC: 12 MMOL/L — SIGNIFICANT CHANGE UP (ref 5–17)
APTT BLD: 32.5 SEC — SIGNIFICANT CHANGE UP (ref 27.5–37.4)
BLD GP AB SCN SERPL QL: NEGATIVE — SIGNIFICANT CHANGE UP
BUN SERPL-MCNC: 21 MG/DL — SIGNIFICANT CHANGE UP (ref 7–23)
CALCIUM SERPL-MCNC: 9.3 MG/DL — SIGNIFICANT CHANGE UP (ref 8.4–10.5)
CHLORIDE SERPL-SCNC: 99 MMOL/L — SIGNIFICANT CHANGE UP (ref 96–108)
CO2 SERPL-SCNC: 30 MMOL/L — SIGNIFICANT CHANGE UP (ref 22–31)
CREAT SERPL-MCNC: 1.13 MG/DL — SIGNIFICANT CHANGE UP (ref 0.5–1.3)
GLUCOSE BLDC GLUCOMTR-MCNC: 101 MG/DL — HIGH (ref 70–99)
GLUCOSE BLDC GLUCOMTR-MCNC: 125 MG/DL — HIGH (ref 70–99)
GLUCOSE BLDC GLUCOMTR-MCNC: 143 MG/DL — HIGH (ref 70–99)
GLUCOSE BLDC GLUCOMTR-MCNC: 88 MG/DL — SIGNIFICANT CHANGE UP (ref 70–99)
GLUCOSE SERPL-MCNC: 97 MG/DL — SIGNIFICANT CHANGE UP (ref 70–99)
HCT VFR BLD CALC: 40.3 % — SIGNIFICANT CHANGE UP (ref 39–50)
HGB BLD-MCNC: 13.5 G/DL — SIGNIFICANT CHANGE UP (ref 13–17)
INR BLD: 1.09 RATIO — SIGNIFICANT CHANGE UP (ref 0.88–1.16)
MCHC RBC-ENTMCNC: 31.4 PG — SIGNIFICANT CHANGE UP (ref 27–34)
MCHC RBC-ENTMCNC: 33.5 GM/DL — SIGNIFICANT CHANGE UP (ref 32–36)
MCV RBC AUTO: 93.7 FL — SIGNIFICANT CHANGE UP (ref 80–100)
PLATELET # BLD AUTO: 211 K/UL — SIGNIFICANT CHANGE UP (ref 150–400)
POTASSIUM SERPL-MCNC: 3 MMOL/L — LOW (ref 3.5–5.3)
POTASSIUM SERPL-SCNC: 3 MMOL/L — LOW (ref 3.5–5.3)
PROTHROM AB SERPL-ACNC: 11.8 SEC — SIGNIFICANT CHANGE UP (ref 9.8–12.7)
RBC # BLD: 4.3 M/UL — SIGNIFICANT CHANGE UP (ref 4.2–5.8)
RBC # FLD: 13.2 % — SIGNIFICANT CHANGE UP (ref 10.3–14.5)
RH IG SCN BLD-IMP: POSITIVE — SIGNIFICANT CHANGE UP
SODIUM SERPL-SCNC: 141 MMOL/L — SIGNIFICANT CHANGE UP (ref 135–145)
VANCOMYCIN TROUGH SERPL-MCNC: 12.2 UG/ML — SIGNIFICANT CHANGE UP (ref 10–20)
WBC # BLD: 6.5 K/UL — SIGNIFICANT CHANGE UP (ref 3.8–10.5)
WBC # FLD AUTO: 6.5 K/UL — SIGNIFICANT CHANGE UP (ref 3.8–10.5)

## 2018-03-06 PROCEDURE — 71045 X-RAY EXAM CHEST 1 VIEW: CPT | Mod: 26

## 2018-03-06 PROCEDURE — 93010 ELECTROCARDIOGRAM REPORT: CPT

## 2018-03-06 RX ORDER — SODIUM CHLORIDE 9 MG/ML
1000 INJECTION INTRAMUSCULAR; INTRAVENOUS; SUBCUTANEOUS
Qty: 0 | Refills: 0 | Status: DISCONTINUED | OUTPATIENT
Start: 2018-03-06 | End: 2018-03-07

## 2018-03-06 RX ORDER — POTASSIUM CHLORIDE 20 MEQ
20 PACKET (EA) ORAL
Qty: 0 | Refills: 0 | Status: COMPLETED | OUTPATIENT
Start: 2018-03-06 | End: 2018-03-06

## 2018-03-06 RX ORDER — POTASSIUM CHLORIDE 20 MEQ
10 PACKET (EA) ORAL
Qty: 0 | Refills: 0 | Status: COMPLETED | OUTPATIENT
Start: 2018-03-06 | End: 2018-03-06

## 2018-03-06 RX ADMIN — Medication 100 MILLIGRAM(S): at 05:57

## 2018-03-06 RX ADMIN — Medication 12.5 MILLIGRAM(S): at 12:14

## 2018-03-06 RX ADMIN — Medication 81 MILLIGRAM(S): at 12:14

## 2018-03-06 RX ADMIN — Medication 100 MILLIEQUIVALENT(S): at 15:00

## 2018-03-06 RX ADMIN — HEPARIN SODIUM 5000 UNIT(S): 5000 INJECTION INTRAVENOUS; SUBCUTANEOUS at 14:12

## 2018-03-06 RX ADMIN — ATORVASTATIN CALCIUM 10 MILLIGRAM(S): 80 TABLET, FILM COATED ORAL at 21:12

## 2018-03-06 RX ADMIN — HEPARIN SODIUM 5000 UNIT(S): 5000 INJECTION INTRAVENOUS; SUBCUTANEOUS at 21:12

## 2018-03-06 RX ADMIN — Medication 250 MILLIGRAM(S): at 21:12

## 2018-03-06 RX ADMIN — PIPERACILLIN AND TAZOBACTAM 25 GRAM(S): 4; .5 INJECTION, POWDER, LYOPHILIZED, FOR SOLUTION INTRAVENOUS at 22:18

## 2018-03-06 RX ADMIN — LOSARTAN POTASSIUM 25 MILLIGRAM(S): 100 TABLET, FILM COATED ORAL at 05:57

## 2018-03-06 RX ADMIN — SENNA PLUS 2 TABLET(S): 8.6 TABLET ORAL at 21:12

## 2018-03-06 RX ADMIN — Medication 100 MILLIGRAM(S): at 17:23

## 2018-03-06 RX ADMIN — Medication 20 MILLIEQUIVALENT(S): at 12:14

## 2018-03-06 RX ADMIN — Medication 100 MILLIEQUIVALENT(S): at 12:14

## 2018-03-06 RX ADMIN — Medication 20 MILLIEQUIVALENT(S): at 10:18

## 2018-03-06 RX ADMIN — PIPERACILLIN AND TAZOBACTAM 25 GRAM(S): 4; .5 INJECTION, POWDER, LYOPHILIZED, FOR SOLUTION INTRAVENOUS at 12:35

## 2018-03-06 RX ADMIN — Medication 100 MILLIEQUIVALENT(S): at 10:35

## 2018-03-06 RX ADMIN — Medication 250 MILLIGRAM(S): at 09:55

## 2018-03-06 RX ADMIN — HEPARIN SODIUM 5000 UNIT(S): 5000 INJECTION INTRAVENOUS; SUBCUTANEOUS at 05:57

## 2018-03-07 ENCOUNTER — TRANSCRIPTION ENCOUNTER (OUTPATIENT)
Age: 83
End: 2018-03-07

## 2018-03-07 ENCOUNTER — RESULT REVIEW (OUTPATIENT)
Age: 83
End: 2018-03-07

## 2018-03-07 LAB
ANION GAP SERPL CALC-SCNC: 12 MMOL/L — SIGNIFICANT CHANGE UP (ref 5–17)
BUN SERPL-MCNC: 17 MG/DL — SIGNIFICANT CHANGE UP (ref 7–23)
CALCIUM SERPL-MCNC: 9.4 MG/DL — SIGNIFICANT CHANGE UP (ref 8.4–10.5)
CHLORIDE SERPL-SCNC: 101 MMOL/L — SIGNIFICANT CHANGE UP (ref 96–108)
CO2 SERPL-SCNC: 25 MMOL/L — SIGNIFICANT CHANGE UP (ref 22–31)
CREAT SERPL-MCNC: 1.05 MG/DL — SIGNIFICANT CHANGE UP (ref 0.5–1.3)
GLUCOSE BLDC GLUCOMTR-MCNC: 106 MG/DL — HIGH (ref 70–99)
GLUCOSE BLDC GLUCOMTR-MCNC: 220 MG/DL — HIGH (ref 70–99)
GLUCOSE BLDC GLUCOMTR-MCNC: 97 MG/DL — SIGNIFICANT CHANGE UP (ref 70–99)
GLUCOSE BLDC GLUCOMTR-MCNC: 98 MG/DL — SIGNIFICANT CHANGE UP (ref 70–99)
GLUCOSE SERPL-MCNC: 105 MG/DL — HIGH (ref 70–99)
POTASSIUM SERPL-MCNC: 3.5 MMOL/L — SIGNIFICANT CHANGE UP (ref 3.5–5.3)
POTASSIUM SERPL-SCNC: 3.5 MMOL/L — SIGNIFICANT CHANGE UP (ref 3.5–5.3)
SODIUM SERPL-SCNC: 138 MMOL/L — SIGNIFICANT CHANGE UP (ref 135–145)
VANCOMYCIN TROUGH SERPL-MCNC: 19.8 UG/ML — SIGNIFICANT CHANGE UP (ref 10–20)

## 2018-03-07 PROCEDURE — 53446 REMOVE URO SPHINCTER: CPT

## 2018-03-07 PROCEDURE — 88300 SURGICAL PATH GROSS: CPT | Mod: 26

## 2018-03-07 RX ORDER — HEPARIN SODIUM 5000 [USP'U]/ML
5000 INJECTION INTRAVENOUS; SUBCUTANEOUS EVERY 8 HOURS
Qty: 0 | Refills: 0 | Status: DISCONTINUED | OUTPATIENT
Start: 2018-03-07 | End: 2018-03-09

## 2018-03-07 RX ORDER — DOCUSATE SODIUM 100 MG
100 CAPSULE ORAL
Qty: 0 | Refills: 0 | Status: DISCONTINUED | OUTPATIENT
Start: 2018-03-07 | End: 2018-03-09

## 2018-03-07 RX ORDER — INSULIN LISPRO 100/ML
VIAL (ML) SUBCUTANEOUS AT BEDTIME
Qty: 0 | Refills: 0 | Status: DISCONTINUED | OUTPATIENT
Start: 2018-03-07 | End: 2018-03-09

## 2018-03-07 RX ORDER — DEXTROSE 50 % IN WATER 50 %
12.5 SYRINGE (ML) INTRAVENOUS ONCE
Qty: 0 | Refills: 0 | Status: DISCONTINUED | OUTPATIENT
Start: 2018-03-07 | End: 2018-03-09

## 2018-03-07 RX ORDER — SODIUM CHLORIDE 9 MG/ML
1000 INJECTION, SOLUTION INTRAVENOUS
Qty: 0 | Refills: 0 | Status: DISCONTINUED | OUTPATIENT
Start: 2018-03-07 | End: 2018-03-09

## 2018-03-07 RX ORDER — SODIUM CHLORIDE 9 MG/ML
1000 INJECTION INTRAMUSCULAR; INTRAVENOUS; SUBCUTANEOUS
Qty: 0 | Refills: 0 | Status: DISCONTINUED | OUTPATIENT
Start: 2018-03-07 | End: 2018-03-09

## 2018-03-07 RX ORDER — DEXTROSE 50 % IN WATER 50 %
1 SYRINGE (ML) INTRAVENOUS ONCE
Qty: 0 | Refills: 0 | Status: DISCONTINUED | OUTPATIENT
Start: 2018-03-07 | End: 2018-03-09

## 2018-03-07 RX ORDER — ATORVASTATIN CALCIUM 80 MG/1
10 TABLET, FILM COATED ORAL AT BEDTIME
Qty: 0 | Refills: 0 | Status: DISCONTINUED | OUTPATIENT
Start: 2018-03-07 | End: 2018-03-09

## 2018-03-07 RX ORDER — DEXTROSE 50 % IN WATER 50 %
25 SYRINGE (ML) INTRAVENOUS ONCE
Qty: 0 | Refills: 0 | Status: DISCONTINUED | OUTPATIENT
Start: 2018-03-07 | End: 2018-03-09

## 2018-03-07 RX ORDER — HYDROMORPHONE HYDROCHLORIDE 2 MG/ML
0.25 INJECTION INTRAMUSCULAR; INTRAVENOUS; SUBCUTANEOUS
Qty: 0 | Refills: 0 | Status: DISCONTINUED | OUTPATIENT
Start: 2018-03-07 | End: 2018-03-07

## 2018-03-07 RX ORDER — POTASSIUM CHLORIDE 20 MEQ
40 PACKET (EA) ORAL ONCE
Qty: 0 | Refills: 0 | Status: COMPLETED | OUTPATIENT
Start: 2018-03-07 | End: 2018-03-07

## 2018-03-07 RX ORDER — GLUCAGON INJECTION, SOLUTION 0.5 MG/.1ML
1 INJECTION, SOLUTION SUBCUTANEOUS ONCE
Qty: 0 | Refills: 0 | Status: DISCONTINUED | OUTPATIENT
Start: 2018-03-07 | End: 2018-03-09

## 2018-03-07 RX ORDER — ONDANSETRON 8 MG/1
4 TABLET, FILM COATED ORAL ONCE
Qty: 0 | Refills: 0 | Status: DISCONTINUED | OUTPATIENT
Start: 2018-03-07 | End: 2018-03-07

## 2018-03-07 RX ORDER — HYDROCHLOROTHIAZIDE 25 MG
12.5 TABLET ORAL DAILY
Qty: 0 | Refills: 0 | Status: DISCONTINUED | OUTPATIENT
Start: 2018-03-07 | End: 2018-03-09

## 2018-03-07 RX ORDER — INSULIN LISPRO 100/ML
VIAL (ML) SUBCUTANEOUS
Qty: 0 | Refills: 0 | Status: DISCONTINUED | OUTPATIENT
Start: 2018-03-07 | End: 2018-03-09

## 2018-03-07 RX ORDER — LOSARTAN POTASSIUM 100 MG/1
25 TABLET, FILM COATED ORAL DAILY
Qty: 0 | Refills: 0 | Status: DISCONTINUED | OUTPATIENT
Start: 2018-03-07 | End: 2018-03-09

## 2018-03-07 RX ORDER — VANCOMYCIN HCL 1 G
1000 VIAL (EA) INTRAVENOUS EVERY 12 HOURS
Qty: 0 | Refills: 0 | Status: DISCONTINUED | OUTPATIENT
Start: 2018-03-07 | End: 2018-03-08

## 2018-03-07 RX ORDER — PIPERACILLIN AND TAZOBACTAM 4; .5 G/20ML; G/20ML
3.38 INJECTION, POWDER, LYOPHILIZED, FOR SOLUTION INTRAVENOUS EVERY 8 HOURS
Qty: 0 | Refills: 0 | Status: DISCONTINUED | OUTPATIENT
Start: 2018-03-07 | End: 2018-03-08

## 2018-03-07 RX ORDER — ASPIRIN/CALCIUM CARB/MAGNESIUM 324 MG
81 TABLET ORAL DAILY
Qty: 0 | Refills: 0 | Status: DISCONTINUED | OUTPATIENT
Start: 2018-03-07 | End: 2018-03-09

## 2018-03-07 RX ORDER — SENNA PLUS 8.6 MG/1
2 TABLET ORAL AT BEDTIME
Qty: 0 | Refills: 0 | Status: DISCONTINUED | OUTPATIENT
Start: 2018-03-07 | End: 2018-03-09

## 2018-03-07 RX ADMIN — Medication 12.5 MILLIGRAM(S): at 12:22

## 2018-03-07 RX ADMIN — Medication 100 MILLIGRAM(S): at 05:53

## 2018-03-07 RX ADMIN — Medication 100 MILLIGRAM(S): at 18:55

## 2018-03-07 RX ADMIN — HEPARIN SODIUM 5000 UNIT(S): 5000 INJECTION INTRAVENOUS; SUBCUTANEOUS at 05:52

## 2018-03-07 RX ADMIN — Medication 250 MILLIGRAM(S): at 20:34

## 2018-03-07 RX ADMIN — LOSARTAN POTASSIUM 25 MILLIGRAM(S): 100 TABLET, FILM COATED ORAL at 05:52

## 2018-03-07 RX ADMIN — SODIUM CHLORIDE 75 MILLILITER(S): 9 INJECTION INTRAMUSCULAR; INTRAVENOUS; SUBCUTANEOUS at 05:55

## 2018-03-07 RX ADMIN — Medication 250 MILLIGRAM(S): at 10:32

## 2018-03-07 RX ADMIN — PIPERACILLIN AND TAZOBACTAM 25 GRAM(S): 4; .5 INJECTION, POWDER, LYOPHILIZED, FOR SOLUTION INTRAVENOUS at 05:52

## 2018-03-07 RX ADMIN — Medication 40 MILLIEQUIVALENT(S): at 10:31

## 2018-03-07 RX ADMIN — SENNA PLUS 2 TABLET(S): 8.6 TABLET ORAL at 22:46

## 2018-03-07 RX ADMIN — PIPERACILLIN AND TAZOBACTAM 25 GRAM(S): 4; .5 INJECTION, POWDER, LYOPHILIZED, FOR SOLUTION INTRAVENOUS at 22:47

## 2018-03-07 RX ADMIN — HEPARIN SODIUM 5000 UNIT(S): 5000 INJECTION INTRAVENOUS; SUBCUTANEOUS at 22:46

## 2018-03-07 RX ADMIN — ATORVASTATIN CALCIUM 10 MILLIGRAM(S): 80 TABLET, FILM COATED ORAL at 22:46

## 2018-03-07 NOTE — PROVIDER CONTACT NOTE (OTHER) - ACTION/TREATMENT ORDERED:
LAMAR Jones and team aware that pt has history of 2nd degree HB and is javid that is why pt is on tele monitoring, no intervention ordered, will continue to monitor pt at this time
No further intervention at this time. Continue to monitor.
None at this time

## 2018-03-07 NOTE — BRIEF OPERATIVE NOTE - POST-OP DX
Cuff erosion of artificial urinary sphincter, initial encounter  03/07/2018    Active  [employer]

## 2018-03-07 NOTE — BRIEF OPERATIVE NOTE - PROCEDURE
<<-----Click on this checkbox to enter Procedure Artificial urinary sphincter  03/07/2018  explantation  Active  [id number]

## 2018-03-08 ENCOUNTER — TRANSCRIPTION ENCOUNTER (OUTPATIENT)
Age: 83
End: 2018-03-08

## 2018-03-08 DIAGNOSIS — I44.2 ATRIOVENTRICULAR BLOCK, COMPLETE: ICD-10-CM

## 2018-03-08 LAB
ANION GAP SERPL CALC-SCNC: 8 MMOL/L — SIGNIFICANT CHANGE UP (ref 5–17)
BUN SERPL-MCNC: 12 MG/DL — SIGNIFICANT CHANGE UP (ref 7–23)
CALCIUM SERPL-MCNC: 8.6 MG/DL — SIGNIFICANT CHANGE UP (ref 8.4–10.5)
CHLORIDE SERPL-SCNC: 101 MMOL/L — SIGNIFICANT CHANGE UP (ref 96–108)
CO2 SERPL-SCNC: 29 MMOL/L — SIGNIFICANT CHANGE UP (ref 22–31)
CREAT SERPL-MCNC: 1.12 MG/DL — SIGNIFICANT CHANGE UP (ref 0.5–1.3)
GLUCOSE BLDC GLUCOMTR-MCNC: 107 MG/DL — HIGH (ref 70–99)
GLUCOSE BLDC GLUCOMTR-MCNC: 149 MG/DL — HIGH (ref 70–99)
GLUCOSE BLDC GLUCOMTR-MCNC: 96 MG/DL — SIGNIFICANT CHANGE UP (ref 70–99)
GLUCOSE BLDC GLUCOMTR-MCNC: 97 MG/DL — SIGNIFICANT CHANGE UP (ref 70–99)
GLUCOSE SERPL-MCNC: 100 MG/DL — HIGH (ref 70–99)
HCT VFR BLD CALC: 35.5 % — LOW (ref 39–50)
HGB BLD-MCNC: 12.2 G/DL — LOW (ref 13–17)
MAGNESIUM SERPL-MCNC: 2 MG/DL — SIGNIFICANT CHANGE UP (ref 1.6–2.6)
MCHC RBC-ENTMCNC: 32.3 PG — SIGNIFICANT CHANGE UP (ref 27–34)
MCHC RBC-ENTMCNC: 34.4 GM/DL — SIGNIFICANT CHANGE UP (ref 32–36)
MCV RBC AUTO: 93.9 FL — SIGNIFICANT CHANGE UP (ref 80–100)
PLATELET # BLD AUTO: 183 K/UL — SIGNIFICANT CHANGE UP (ref 150–400)
POTASSIUM SERPL-MCNC: 3.3 MMOL/L — LOW (ref 3.5–5.3)
POTASSIUM SERPL-SCNC: 3.3 MMOL/L — LOW (ref 3.5–5.3)
RBC # BLD: 3.78 M/UL — LOW (ref 4.2–5.8)
RBC # FLD: 13.4 % — SIGNIFICANT CHANGE UP (ref 10.3–14.5)
SODIUM SERPL-SCNC: 138 MMOL/L — SIGNIFICANT CHANGE UP (ref 135–145)
WBC # BLD: 8.7 K/UL — SIGNIFICANT CHANGE UP (ref 3.8–10.5)
WBC # FLD AUTO: 8.7 K/UL — SIGNIFICANT CHANGE UP (ref 3.8–10.5)

## 2018-03-08 RX ORDER — POTASSIUM CHLORIDE 20 MEQ
40 PACKET (EA) ORAL ONCE
Qty: 0 | Refills: 0 | Status: COMPLETED | OUTPATIENT
Start: 2018-03-08 | End: 2018-03-08

## 2018-03-08 RX ADMIN — Medication 250 MILLIGRAM(S): at 08:27

## 2018-03-08 RX ADMIN — ATORVASTATIN CALCIUM 10 MILLIGRAM(S): 80 TABLET, FILM COATED ORAL at 21:22

## 2018-03-08 RX ADMIN — Medication 40 MILLIEQUIVALENT(S): at 08:27

## 2018-03-08 RX ADMIN — HEPARIN SODIUM 5000 UNIT(S): 5000 INJECTION INTRAVENOUS; SUBCUTANEOUS at 21:22

## 2018-03-08 RX ADMIN — Medication 100 MILLIGRAM(S): at 05:48

## 2018-03-08 RX ADMIN — Medication 100 MILLIGRAM(S): at 18:17

## 2018-03-08 RX ADMIN — Medication 81 MILLIGRAM(S): at 14:28

## 2018-03-08 RX ADMIN — PIPERACILLIN AND TAZOBACTAM 25 GRAM(S): 4; .5 INJECTION, POWDER, LYOPHILIZED, FOR SOLUTION INTRAVENOUS at 05:48

## 2018-03-08 RX ADMIN — LOSARTAN POTASSIUM 25 MILLIGRAM(S): 100 TABLET, FILM COATED ORAL at 05:48

## 2018-03-08 RX ADMIN — Medication 12.5 MILLIGRAM(S): at 05:48

## 2018-03-08 RX ADMIN — SODIUM CHLORIDE 75 MILLILITER(S): 9 INJECTION INTRAMUSCULAR; INTRAVENOUS; SUBCUTANEOUS at 14:27

## 2018-03-08 RX ADMIN — HEPARIN SODIUM 5000 UNIT(S): 5000 INJECTION INTRAVENOUS; SUBCUTANEOUS at 14:27

## 2018-03-08 RX ADMIN — SENNA PLUS 2 TABLET(S): 8.6 TABLET ORAL at 21:22

## 2018-03-08 RX ADMIN — Medication 1 TABLET(S): at 18:16

## 2018-03-08 RX ADMIN — HEPARIN SODIUM 5000 UNIT(S): 5000 INJECTION INTRAVENOUS; SUBCUTANEOUS at 05:48

## 2018-03-08 RX ADMIN — PIPERACILLIN AND TAZOBACTAM 25 GRAM(S): 4; .5 INJECTION, POWDER, LYOPHILIZED, FOR SOLUTION INTRAVENOUS at 14:28

## 2018-03-08 NOTE — DISCHARGE NOTE ADULT - CARE PLAN
Principal Discharge DX:	H/O prostatectomy  Goal:	Explant AUS  Assessment and plan of treatment:	-S/p Explant AUS due to erosion   -Patient will continue with monte catheter for 2 weeks   -Will be discharged on Augmentin for 2 weeks   -Penrose drain removed before discharge  Secondary Diagnosis:	Type 2 diabetes mellitus with other diabetic arthropathy, unspecified long term insulin use status  Goal:	Maintenance  Assessment and plan of treatment:	-Continue with home regimen  Secondary Diagnosis:	Hypertension, unspecified type  Goal:	Maintenance  Assessment and plan of treatment:	-Continue with hydroCHLOROthiazide, Losartan  Secondary Diagnosis:	Pure hypercholesterolemia  Goal:	Maintenance  Assessment and plan of treatment:	-c/w Lipitor

## 2018-03-08 NOTE — PROGRESS NOTE ADULT - PROBLEM SELECTOR PLAN 1
known intermittent CHB; in past refused PPM. Evaluated by EP in past. Seen by EP here, refusing PPM here as well.  EKG showing 1st deg mobitz  - tele overnight showing stable HR 50s, during day 44-70.  - he underwent AUS replacement in 12/17 w/ out PPM (CHB w/ significant conduction dz identified in 7/17)- went well w/ out issue.   - continue to monitor tele  - hold all AV isabella agents - no BB, metoprolol, etc.
-Currently in type 1 second degree heart block. Telemetry reviewed, no further Complete Heart Block noted on telemetry  -Patient's daughter Lorenza/Health Care Proxy currently stating that she needs to discuss PPM implantation with the patient's Cardiologist prior to agreeing for PPM implant.   Patient's daughter Lorenza would also like CT Surgery to evaluate her father for a Loretta clip which they previously refused in the past.   -Patient's daughter Lorenza advised to call EP Clinic once patient has completed antibiotics for scheduling of PPM implant out patient.  -EP Clinic out patient follow up [phone]     Cee [phone]

## 2018-03-08 NOTE — PROGRESS NOTE ADULT - ATTENDING COMMENTS
As above- pt seen and examined  agree with plan; will f/u
Pt seen and examined  agree with above assessment  cont current management in anticipation surgery this week with Dr. King
Pt seen/examined.  Case discussed with housestaff/PA team.  Agree with above note history, physical and assessment/plan.
seen and examined with NP. I agree with H & P, A & P.  Follow up as outpatient upon completion of antibiotics.
Will sign off. Please call for any issues.    Lalita Howe MD  HOSPITALIST  [phone]

## 2018-03-08 NOTE — DISCHARGE NOTE ADULT - ADDITIONAL INSTRUCTIONS
-Patient will follow up with Dr. King in 2 weeks   -Will keep monte for 2 weeks and will be discharged with 2 weeks of Augmentin -Patient will follow up with Dr. King in 2 weeks   -Will keep monte for 2 weeks and will be discharged with 1 week of Augmentin

## 2018-03-08 NOTE — DISCHARGE NOTE ADULT - PLAN OF CARE
Explant AUS -S/p Explant AUS due to erosion   -Patient will continue with monte catheter for 2 weeks   -Will be discharged on Augmentin for 2 weeks   -Penrose drain removed before discharge Maintenance -Continue with home regimen -Continue with hydroCHLOROthiazide, Losartan -c/w Lipitor

## 2018-03-08 NOTE — DISCHARGE NOTE ADULT - HOSPITAL COURSE
82M PMH T2DM, Prostate CA s/p prostatectomy (~20 yrs ago) s/p AUS p/w urinary retention. He has baseline urinary incontinence after his prostatectomy and has a incontinence control device implant which he uses to assist with urination. Patient s/p AUS explant found to have asymptomatic bradycardia and thickened MV on TTE 07/2017. Decmeber 2017 patient s/p AUS placement. Patient admits to drainage from the RLQ incision site, which he only admits to clear drainage no purulent discharge or blood. Patient currently admitted due to urinary retention with associated erosion of AUS; monte catheter was placed then removed. Patient was admitted for explant of AUS and placed on Zosyn and Vancomycin. Patient s/p explant of Artificial urinary sphincter due to erosion, patient will be discharged with monte catheter via hypospadias for 2 weeks and Augmentin for 2 weeks. Post op he did well. his vitals were stable, he tolerated diet, his pain was controlled, he ambulated  and his labs were stable. Patient's penrose drain was removed from the abdomen. To promote wound healing do not take a bath, continue to walk frequently, return to daily living activities slowly, no heavy lifting greater than 10lbs for 4-6 weeks, follow up Dr King in 2 weeks. 82M PMH T2DM, Prostate CA s/p prostatectomy (~20 yrs ago) s/p AUS p/w urinary retention. He has baseline urinary incontinence after his prostatectomy and has a incontinence control device implant which he uses to assist with urination. Patient s/p AUS explant found to have asymptomatic bradycardia and thickened MV on TTE 07/2017. Decmeber 2017 patient s/p AUS placement. Patient admits to drainage from the RLQ incision site, which he only admits to clear drainage no purulent discharge or blood. Patient currently admitted due to urinary retention with associated erosion of AUS; monte catheter was placed then removed. Patient was admitted for explant of AUS and placed on Zosyn and Vancomycin. Patient s/p explant of Artificial urinary sphincter due to erosion, patient will be discharged with monte catheter via hypospadias for 2 weeks and Augmentin for 2 weeks. Post op he did well. his vitals were stable, he tolerated diet, his pain was controlled, he ambulated  and his labs were stable. Patient's penrose drain was removed from the abdomen. To promote wound healing do not take a bath, continue to walk frequently, return to daily living activities slowly, no heavy lifting greater than 10lbs for 4-6 weeks, follow up Dr King in 2 weeks. Patient will be discharged to [facility].

## 2018-03-08 NOTE — DISCHARGE NOTE ADULT - SECONDARY DIAGNOSIS.
Type 2 diabetes mellitus with other diabetic arthropathy, unspecified long term insulin use status Hypertension, unspecified type Pure hypercholesterolemia

## 2018-03-08 NOTE — DISCHARGE NOTE ADULT - MEDICATION SUMMARY - MEDICATIONS TO TAKE
I will START or STAY ON the medications listed below when I get home from the hospital:    aspirin 81 mg oral tablet  -- 1 tab(s) by mouth once a day  -- Indication: For Mitral regurgitation    losartan 25 mg oral tablet  -- 1 tab(s) by mouth once a day  -- Indication: For Hypertension    Lipitor 10 mg oral tablet  -- 1 tab(s) by mouth once a day  -- Indication: For Hyperlipidemia     hydroCHLOROthiazide 12.5 mg oral tablet  -- 1 tab(s) by mouth once a day  -- Indication: For Hypertension    senna oral tablet  -- 2 tab(s) by mouth once a day (at bedtime)  -- Indication: For constipation     mg oral tablet  -- 3 tab(s) by mouth once a day (at bedtime)  -- Indication: For constipation

## 2018-03-08 NOTE — DISCHARGE NOTE ADULT - CARE PROVIDER_API CALL
Uriel King), Urology  [address] [place] [address]  Phone: [phone]  Fax: [phone]

## 2018-03-08 NOTE — DISCHARGE NOTE ADULT - PATIENT PORTAL LINK FT
You can access the [facility] Patient Portal, offered by [facility], by registering with the following website: [email or web address]

## 2018-03-09 ENCOUNTER — APPOINTMENT (OUTPATIENT)
Dept: UROLOGY | Facility: CLINIC | Age: 83
End: 2018-03-09

## 2018-03-09 VITALS
HEART RATE: 69 BPM | DIASTOLIC BLOOD PRESSURE: 76 MMHG | SYSTOLIC BLOOD PRESSURE: 153 MMHG | RESPIRATION RATE: 16 BRPM | OXYGEN SATURATION: 99 % | TEMPERATURE: 98 F

## 2018-03-09 LAB
ANION GAP SERPL CALC-SCNC: 10 MMOL/L — SIGNIFICANT CHANGE UP (ref 5–17)
BUN SERPL-MCNC: 12 MG/DL — SIGNIFICANT CHANGE UP (ref 7–23)
CALCIUM SERPL-MCNC: 8.8 MG/DL — SIGNIFICANT CHANGE UP (ref 8.4–10.5)
CHLORIDE SERPL-SCNC: 104 MMOL/L — SIGNIFICANT CHANGE UP (ref 96–108)
CO2 SERPL-SCNC: 27 MMOL/L — SIGNIFICANT CHANGE UP (ref 22–31)
CREAT SERPL-MCNC: 1.03 MG/DL — SIGNIFICANT CHANGE UP (ref 0.5–1.3)
GLUCOSE BLDC GLUCOMTR-MCNC: 102 MG/DL — HIGH (ref 70–99)
GLUCOSE BLDC GLUCOMTR-MCNC: 118 MG/DL — HIGH (ref 70–99)
GLUCOSE BLDC GLUCOMTR-MCNC: 89 MG/DL — SIGNIFICANT CHANGE UP (ref 70–99)
GLUCOSE SERPL-MCNC: 102 MG/DL — HIGH (ref 70–99)
POTASSIUM SERPL-MCNC: 3.4 MMOL/L — LOW (ref 3.5–5.3)
POTASSIUM SERPL-SCNC: 3.4 MMOL/L — LOW (ref 3.5–5.3)
SODIUM SERPL-SCNC: 141 MMOL/L — SIGNIFICANT CHANGE UP (ref 135–145)
SURGICAL PATHOLOGY STUDY: SIGNIFICANT CHANGE UP

## 2018-03-09 PROCEDURE — 97162 PT EVAL MOD COMPLEX 30 MIN: CPT

## 2018-03-09 PROCEDURE — 71045 X-RAY EXAM CHEST 1 VIEW: CPT

## 2018-03-09 PROCEDURE — 85610 PROTHROMBIN TIME: CPT

## 2018-03-09 PROCEDURE — 86901 BLOOD TYPING SEROLOGIC RH(D): CPT

## 2018-03-09 PROCEDURE — 82962 GLUCOSE BLOOD TEST: CPT

## 2018-03-09 PROCEDURE — 85730 THROMBOPLASTIN TIME PARTIAL: CPT

## 2018-03-09 PROCEDURE — 52000 CYSTOURETHROSCOPY: CPT

## 2018-03-09 PROCEDURE — 86850 RBC ANTIBODY SCREEN: CPT

## 2018-03-09 PROCEDURE — 86900 BLOOD TYPING SEROLOGIC ABO: CPT

## 2018-03-09 PROCEDURE — 93005 ELECTROCARDIOGRAM TRACING: CPT

## 2018-03-09 PROCEDURE — 83036 HEMOGLOBIN GLYCOSYLATED A1C: CPT

## 2018-03-09 PROCEDURE — 80048 BASIC METABOLIC PNL TOTAL CA: CPT

## 2018-03-09 PROCEDURE — 83735 ASSAY OF MAGNESIUM: CPT

## 2018-03-09 PROCEDURE — 87070 CULTURE OTHR SPECIMN AEROBIC: CPT

## 2018-03-09 PROCEDURE — 88300 SURGICAL PATH GROSS: CPT

## 2018-03-09 PROCEDURE — 80202 ASSAY OF VANCOMYCIN: CPT

## 2018-03-09 PROCEDURE — 85027 COMPLETE CBC AUTOMATED: CPT

## 2018-03-09 RX ORDER — POTASSIUM CHLORIDE 20 MEQ
40 PACKET (EA) ORAL ONCE
Qty: 0 | Refills: 0 | Status: COMPLETED | OUTPATIENT
Start: 2018-03-09 | End: 2018-03-09

## 2018-03-09 RX ADMIN — Medication 40 MILLIEQUIVALENT(S): at 11:31

## 2018-03-09 RX ADMIN — Medication 12.5 MILLIGRAM(S): at 05:26

## 2018-03-09 RX ADMIN — Medication 1 TABLET(S): at 17:03

## 2018-03-09 RX ADMIN — Medication 81 MILLIGRAM(S): at 11:31

## 2018-03-09 RX ADMIN — HEPARIN SODIUM 5000 UNIT(S): 5000 INJECTION INTRAVENOUS; SUBCUTANEOUS at 05:26

## 2018-03-09 RX ADMIN — Medication 100 MILLIGRAM(S): at 05:26

## 2018-03-09 RX ADMIN — Medication 1 TABLET(S): at 05:26

## 2018-03-09 RX ADMIN — LOSARTAN POTASSIUM 25 MILLIGRAM(S): 100 TABLET, FILM COATED ORAL at 05:26

## 2018-03-09 RX ADMIN — HEPARIN SODIUM 5000 UNIT(S): 5000 INJECTION INTRAVENOUS; SUBCUTANEOUS at 13:16

## 2018-03-09 NOTE — PHYSICAL THERAPY INITIAL EVALUATION ADULT - REFERRING PHYSICIAN, REHAB EVAL
pertinent history continued... Patient admits to drainage from the RLQ incision site, which he only admits to clear drainage no purulent discharge or blood. Patient currently admitted due to urinary retention with associated erosion of AUS; monte catheter was placed then removed. Patient denies f/n/v/d or chills. pertinent history continued... Patient admits to drainage from the RLQ incision site, which he only admits to clear drainage no purulent discharge or blood. Patient currently admitted due to urinary retention with associated erosion of AUS; monte catheter was placed then removed. Patient denies f/n/v/d or chills.  Pt admitted with urinary retention in the setting of erosion of AUS. Pt admitted for AUS removal. Pt s/p artificial urinary sphincter explantation 3/7/18.

## 2018-03-09 NOTE — PHYSICAL THERAPY INITIAL EVALUATION ADULT - IMPAIRMENTS CONTRIBUTING TO GAIT DEVIATIONS, PT EVAL
narrow base of support/pain/decreased strength/impaired balance/Pt mildly unsteady with rolling walker, 0 LOB while ambulating.

## 2018-03-09 NOTE — PROGRESS NOTE ADULT - PROVIDER SPECIALTY LIST ADULT
Electrophysiology
Urology
Hospitalist

## 2018-03-09 NOTE — PROGRESS NOTE ADULT - SUBJECTIVE AND OBJECTIVE BOX
Post op Check    Pt seen and examined without complaints. Pain is controlled. Denies SOB/CP/N/V.     Vital Signs Last 24 Hrs  T(C): 36.3 (07 Mar 2018 17:25), Max: 36.7 (06 Mar 2018 19:01)  T(F): 97.4 (07 Mar 2018 17:25), Max: 98 (06 Mar 2018 19:01)  HR: 57 (07 Mar 2018 17:25) (46 - 557)  BP: 118/65 (07 Mar 2018 17:25) (112/51 - 148/74)  BP(mean): 84 (07 Mar 2018 16:30) (80 - 85)  RR: 20 (07 Mar 2018 17:25) (14 - 20)  SpO2: 98% (07 Mar 2018 17:25) (96% - 100%)    I&O's Summary    monte 450cc        Physical Exam  Gen: NAD, A&Ox3  Pulm: No respiratory distress, no subcostal retractions  CV: RRR, no JVD  Abd: Soft, NT, ND, RLQ incisional dressing CDI, +penrose  : +monte draining clear urine, +perineal incisional dressing CDI                          13.5   6.5   )-----------( 211      ( 06 Mar 2018 09:03 )             40.3       03-07    138  |  101  |  17  ----------------------------<  105<H>  3.5   |  25  |  1.05    Ca    9.4      07 Mar 2018 07:02        A/P: 83y Male s/p removal of AUS  DVT prophylaxis/OOB  Incentive spirometry  Strict I&O's, keep monte for 2 weeks  Analgesia and antiemetics as needed  regular Diet  AM labs  d/c penrose tomorrow
24H hour events: Patient resting comfortably in bed. Denies c/o CP, palpitations, dizziness, lightheadedness, or SOB.     MEDICATIONS:  aspirin  chewable 81 milliGRAM(s) Oral daily  heparin  Injectable 5000 Unit(s) SubCutaneous every 8 hours  hydrochlorothiazide 12.5 milliGRAM(s) Oral daily  losartan 25 milliGRAM(s) Oral daily    piperacillin/tazobactam IVPB. 3.375 Gram(s) IV Intermittent every 8 hours  vancomycin  IVPB 1000 milliGRAM(s) IV Intermittent every 12 hours        docusate sodium 100 milliGRAM(s) Oral two times a day  senna 2 Tablet(s) Oral at bedtime    atorvastatin 10 milliGRAM(s) Oral at bedtime  dextrose 50% Injectable 12.5 Gram(s) IV Push once  dextrose 50% Injectable 25 Gram(s) IV Push once  dextrose 50% Injectable 25 Gram(s) IV Push once  dextrose 50% Injectable 12.5 Gram(s) IV Push once  dextrose 50% Injectable 25 Gram(s) IV Push once  dextrose 50% Injectable 25 Gram(s) IV Push once  dextrose Gel 1 Dose(s) Oral once PRN  dextrose Gel 1 Dose(s) Oral once PRN  glucagon  Injectable 1 milliGRAM(s) IntraMuscular once PRN  glucagon  Injectable 1 milliGRAM(s) IntraMuscular once PRN  insulin lispro (HumaLOG) corrective regimen sliding scale   SubCutaneous at bedtime  insulin lispro (HumaLOG) corrective regimen sliding scale   SubCutaneous three times a day before meals    dextrose 5%. 1000 milliLiter(s) IV Continuous <Continuous>  dextrose 5%. 1000 milliLiter(s) IV Continuous <Continuous>  sodium chloride 0.9%. 1000 milliLiter(s) IV Continuous <Continuous>      REVIEW OF SYSTEMS:  Complete 10point ROS negative.    PHYSICAL EXAM:  T(C): 36.7 (03-08-18 @ 13:46), Max: 37.3 (03-08-18 @ 05:43)  HR: 56 (03-08-18 @ 13:46) (51 - 77)  BP: 119/55 (03-08-18 @ 13:46) (101/56 - 135/59)  RR: 18 (03-08-18 @ 13:46) (16 - 20)  SpO2: 98% (03-08-18 @ 13:46) (96% - 100%)      07 Mar 2018 07:01  -  08 Mar 2018 07:00  --------------------------------------------------------  IN: 2055 mL / OUT: 2050 mL / NET: 5 mL    08 Mar 2018 07:01  -  08 Mar 2018 16:40  --------------------------------------------------------  IN: 1240 mL / OUT: 0 mL / NET: 1240 mL        Appearance: Normal	  Cardiovascular: Normal S1 S2, No JVD, No murmurs, No edema  Respiratory: Lungs clear to auscultation	  Psychiatry: A & O x 2, Mood & affect appropriate  Gastrointestinal:  Soft, Non-tender, + BS	  Skin: No rashes, No ecchymoses, No cyanosis	  Neurologic: Non-focal  Extremities: Normal range of motion, No clubbing, cyanosis or edema  Vascular: Peripheral pulses palpable 2+ bilaterally        LABS:	 	    CBC Full  -  ( 08 Mar 2018 06:54 )  WBC Count : 8.7 K/uL  Hemoglobin : 12.2 g/dL  Hematocrit : 35.5 %  Platelet Count - Automated : 183 K/uL  Mean Cell Volume : 93.9 fl  Mean Cell Hemoglobin : 32.3 pg  Mean Cell Hemoglobin Concentration : 34.4 gm/dL  Auto Neutrophil # : x  Auto Lymphocyte # : x  Auto Monocyte # : x  Auto Eosinophil # : x  Auto Basophil # : x  Auto Neutrophil % : x  Auto Lymphocyte % : x  Auto Monocyte % : x  Auto Eosinophil % : x  Auto Basophil % : x    03-08    138  |  101  |  12  ----------------------------<  100<H>  3.3<L>   |  29  |  1.12  03-07    138  |  101  |  17  ----------------------------<  105<H>  3.5   |  25  |  1.05    Ca    8.6      08 Mar 2018 06:54  Ca    9.4      07 Mar 2018 07:02  Mg     2.0     03-08          TELEMETRY: 	  NSR, 1st Degree 50's- 60's. Intermittent Wenckebach/ Second Degree AV Block Type 1.  No complete Heart Block
UROLOGY DAILY PROGRESS NOTE:     Subjective: Patient seen and examined at bedside.   No acute events overnight     Objective:  Vital signs  T(F): , Max: 99.2 (03-08-18 @ 05:43)  HR: 66 (03-08-18 @ 05:43)  BP: 110/51 (03-08-18 @ 05:43)  SpO2: 98% (03-08-18 @ 05:43)  Wt(kg): --    Output     I&O's Detail    06 Mar 2018 07:01  -  07 Mar 2018 07:00  --------------------------------------------------------  IN:    Oral Fluid: 1100 mL    sodium chloride 0.9%: 600 mL  Total IN: 1700 mL    OUT:  Total OUT: 0 mL    Total NET: 1700 mL      07 Mar 2018 07:01  -  08 Mar 2018 06:26  --------------------------------------------------------  IN:    Oral Fluid: 480 mL    sodium chloride 0.9%.: 225 mL  Total IN: 705 mL    OUT:    Indwelling Catheter - Urethral: 2050 mL  Total OUT: 2050 mL    Total NET: -1345 mL          Physical Exam:  Gen: NAD  Abd: nondistended with incisional tenderness, penrose minimal discharge incision C/D I penrose removed  pernieal incision C/D/I  : monte via hypospadias clear urine     Labs:  03-07  x     / x     /1.05   03-06  6.5   / 40.3  /1.13                           13.5   6.5   )-----------( 211      ( 06 Mar 2018 09:03 )             40.3     03-07    138  |  101  |  17  ----------------------------<  105<H>  3.5   |  25  |  1.05    Ca    9.4      07 Mar 2018 07:02      PT/INR - ( 06 Mar 2018 09:03 )   PT: 11.8 sec;   INR: 1.09 ratio         PTT - ( 06 Mar 2018 09:03 )  PTT:32.5 sec      Urine Cx:
UROLOGY DAILY PROGRESS NOTE:     Subjective: Patient seen and examined at bedside. No acute events overnight.  Patient consented in Cantonese.       Objective:  Vital signs  T(F): , Max: 98.1 (03-06-18 @ 01:57)  HR: 50 (03-06-18 @ 05:56)  BP: 150/69 (03-06-18 @ 05:56)  SpO2: 98% (03-06-18 @ 05:56)  Wt(kg): --    Output     I&O's Detail    04 Mar 2018 07:01  -  05 Mar 2018 07:00  --------------------------------------------------------  IN:    Oral Fluid: 980 mL  Total IN: 980 mL    OUT:  Total OUT: 0 mL    Total NET: 980 mL      05 Mar 2018 07:01  -  06 Mar 2018 06:52  --------------------------------------------------------  IN:    Oral Fluid: 900 mL  Total IN: 900 mL    OUT:  Total OUT: 0 mL    Total NET: 900 mL          Physical Exam:  Gen: NAD  Abd: RLG area indurated dry skin no discharge nontender  : incontinenet    Labs:                  Urine Cx:
UROLOGY DAILY PROGRESS NOTE:     Subjective: Patient seen and examined at bedside. No overnight events.       Objective:  Vital signs  T(F): , Max: 98.1 (03-03-18 @ 17:26)  HR: 51 (03-04-18 @ 04:49)  BP: 135/46 (03-04-18 @ 04:49)  SpO2: 98% (03-04-18 @ 04:49)  Wt(kg): --    I&O's Summary    03 Mar 2018 07:01  -  04 Mar 2018 07:00  --------------------------------------------------------  IN: 1420 mL / OUT: 0 mL / NET: 1420 mL        Gen: NAD  Pulm: No respiratory distress, no subcostal retractions  CV: RRR, no JVD  Abd: Soft, NT, ND, RLQ resevoir site indurated, no erythema nontender, no drainage  : Penis with some ventral erosion, no erythema, scrotum WNL     Labs:  03-02  7.2   / 39.6  /0.94                           13.7   7.2   )-----------( 249      ( 02 Mar 2018 17:59 )             39.6     03-02    139  |  98  |  27<H>  ----------------------------<  98  3.7   |  27  |  0.94    Ca    9.9      02 Mar 2018 17:59          Urine Cx:  office culture negative
UROLOGY DAILY PROGRESS NOTE:     Subjective: Patient seen and examined at bedside. No overnight events.       Objective:  Vital signs  T(F): , Max: 98.6 (03-02-18 @ 19:18)  HR: 54 (03-03-18 @ 04:54)  BP: 113/60 (03-03-18 @ 04:54)  SpO2: 98% (03-03-18 @ 04:54)  Wt(kg): --    I&O's Summary    02 Mar 2018 07:01  -  03 Mar 2018 07:00  --------------------------------------------------------  IN: 1090 mL / OUT: 200 mL / NET: 890 mL        Gen: NAD  Pulm: No respiratory distress, no subcostal retractions  CV: RRR, no JVD  Abd: Soft, NT, ND, RLQ resevoir site indurated, no erythema nontender, no drainage  : Penis with some ventral erosion, no erythema, scrotum WNL     Labs:  03-02  7.2   / 39.6  /0.94                           13.7   7.2   )-----------( 249      ( 02 Mar 2018 17:59 )             39.6     03-02    139  |  98  |  27<H>  ----------------------------<  98  3.7   |  27  |  0.94    Ca    9.9      02 Mar 2018 17:59
UROLOGY DAILY PROGRESS NOTE:     Subjective: Patient seen and examined at bedside. No overnight events.       Objective:  Vital signs  T(F): , Max: 99 (03-04-18 @ 17:05)  HR: 64 (03-05-18 @ 04:27)  BP: 127/62 (03-05-18 @ 04:27)  SpO2: 98% (03-05-18 @ 04:27)  Wt(kg): --    I&O's Summary    03 Mar 2018 07:01  -  04 Mar 2018 07:00  --------------------------------------------------------  IN: 1420 mL / OUT: 0 mL / NET: 1420 mL    04 Mar 2018 07:01  -  05 Mar 2018 06:51  --------------------------------------------------------  IN: 980 mL / OUT: 0 mL / NET: 980 mL        Gen: NAD  Pulm: No respiratory distress, no subcostal retractions  CV: RRR, no JVD  Abd: Soft, NT, ND, RLQ resevoir site indurated, no erythema nontender, no drainage  : Penis with some ventral erosion, no erythema, scrotum WNL       Urine Cx: neg
UROLOGY DAILY PROGRESS NOTE:     Subjective: Patient seen and examined at bedside. No overnight events.       Objective:  Vital signs  T(F): , Max: 99.4 (03-08-18 @ 21:59)  HR: 58 (03-09-18 @ 05:14)  BP: 123/68 (03-09-18 @ 05:14)  SpO2: 98% (03-09-18 @ 05:14)  Wt(kg): --    I&O's Summary    07 Mar 2018 07:01  -  08 Mar 2018 07:00  --------------------------------------------------------  IN: 2055 mL / OUT: 2050 mL / NET: 5 mL    08 Mar 2018 07:01  -  09 Mar 2018 06:32  --------------------------------------------------------  IN: 2245 mL / OUT: 2330 mL / NET: -85 mL        Gen: NAD  Pulm: No respiratory distress, no subcostal retractions  CV: RRR, no JVD  Abd: Soft, NT, ND  : Lemus in place via hypospadias       Labs:  03-08  8.7   / 35.5  /1.12   03-07  x     / x     /1.05                           12.2   8.7   )-----------( 183      ( 08 Mar 2018 06:54 )             35.5     03-08    138  |  101  |  12  ----------------------------<  100<H>  3.3<L>   |  29  |  1.12    Ca    8.6      08 Mar 2018 06:54  Mg     2.0     03-08          Urine Cx:
UROLOGY PA PROGRESS NOTE:     Subjective:  no acute events overnight. OR today. no complaints.     Objective:  Vital signs  T(F): , Max: 98 (03-06-18 @ 19:01)  HR: 62 (03-07-18 @ 05:57)  BP: 148/74 (03-07-18 @ 05:57)  SpO2: 96% (03-07-18 @ 05:57)  Wt(kg): --    Output     03-06 @ 07:01  -  03-07 @ 07:00  --------------------------------------------------------  IN: 1700 mL / OUT: 0 mL / NET: 1700 mL        Physical Exam:  Gen: NAD  Abd: soft, NT/ND, RLQ incision dry, clear with some induration  :  incontinent     Labs:  03-06  6.5   / 40.3  /1.13                           13.5   6.5   )-----------( 211      ( 06 Mar 2018 09:03 )             40.3     03-06    141  |  99  |  21  ----------------------------<  97  3.0<L>   |  30  |  1.13    Ca    9.3      06 Mar 2018 09:03      PT/INR - ( 06 Mar 2018 09:03 )   PT: 11.8 sec;   INR: 1.09 ratio         PTT - ( 06 Mar 2018 09:03 )  PTT:32.5 sec
Urology Preop Note    Diagnosis: eroded AUS  Procedure: explant of artificial urinary sphincter   Surgeon: Dr. King                          13.5   6.5   )-----------( 211      ( 06 Mar 2018 09:03 )             40.3       03-06    141  |  99  |  21  ----------------------------<  97  3.0<L>   |  30  |  1.13    Ca    9.3      06 Mar 2018 09:03        PT/INR - ( 06 Mar 2018 09:03 )   PT: 11.8 sec;   INR: 1.09 ratio         PTT - ( 06 Mar 2018 09:03 )  PTT:32.5 sec      UCx: neg    EKG: completed 3/6 in chart  CXR: completed 3/6 - clear     Orders:  NPO after midnight  IVF  Consent obtained- in chart   Clearance obtained  Type & Screen
MEDICINE CONSULT NOTE FOLLOW UP    Patient is a 83y old  Male who presents with a chief complaint of Urinary retention, malfunctioning AUS (02 Mar 2018 15:48)    SUBJECTIVE / OVERNIGHT EVENTS:  Overnight, no acute events.  Pt seen at bedside c/o chronic neuropathy in his b/l feet which prevents him from sleeping well.  Denies other complaints. Denies urinary problems, cp, sob, n/v, f/chills, dizziness.    CAPILLARY BLOOD GLUCOSE    POCT Blood Glucose.: 109 mg/dL (03 Mar 2018 16:28)  POCT Blood Glucose.: 131 mg/dL (03 Mar 2018 11:37)  POCT Blood Glucose.: 97 mg/dL (03 Mar 2018 07:31)  POCT Blood Glucose.: 100 mg/dL (02 Mar 2018 21:38)  POCT Blood Glucose.: 100 mg/dL (02 Mar 2018 16:49)    I&O's Summary    02 Mar 2018 07:01  -  03 Mar 2018 07:00  --------------------------------------------------------  IN: 1090 mL / OUT: 200 mL / NET: 890 mL    03 Mar 2018 07:01  -  03 Mar 2018 16:44  --------------------------------------------------------  IN: 500 mL / OUT: 0 mL / NET: 500 mL    Vital Signs Last 24 Hrs  T(C): 36.4 (03 Mar 2018 11:24), Max: 37 (02 Mar 2018 19:18)  T(F): 97.6 (03 Mar 2018 11:24), Max: 98.6 (02 Mar 2018 19:18)  HR: 56 (03 Mar 2018 11:24) (51 - 77)  BP: 116/61 (03 Mar 2018 11:24) (113/60 - 145/65)  BP(mean): --  RR: 17 (03 Mar 2018 11:24) (17 - 18)  SpO2: 97% (03 Mar 2018 11:24) (97% - 98%)    PHYSICAL EXAM:  GENERAL:  Well appearing, cantonese sp m, in NAD  HEAD:  NCAT  EYES: PERRLA, conjunctiva clear  NECK: Supple, No JVD  CHEST/LUNG: CTA B/L. No w/r/r.  HEART: Bradycardic. Normal S1, S2. II/VI systollic murmur.  ABDOMEN: SNTND. Bowel sounds present. R sided induration chronic of skin of Lower abdomen.   EXTREMITIES:  2+ Peripheral Pulses, No clubbing, cyanosis, edema.  PSYCH: AAOx3, appropriate affect    LABS:                        13.7   7.2   )-----------( 249      ( 02 Mar 2018 17:59 )             39.6     03-02    139  |  98  |  27<H>  ----------------------------<  98  3.7   |  27  |  0.94    Ca    9.9      02 Mar 2018 17:59      MEDICATIONS  (STANDING):  aspirin  chewable 81 milliGRAM(s) Oral daily  atorvastatin 10 milliGRAM(s) Oral at bedtime  dextrose 5%. 1000 milliLiter(s) (50 mL/Hr) IV Continuous <Continuous>  dextrose 5%. 1000 milliLiter(s) (50 mL/Hr) IV Continuous <Continuous>  dextrose 50% Injectable 12.5 Gram(s) IV Push once  dextrose 50% Injectable 25 Gram(s) IV Push once  dextrose 50% Injectable 25 Gram(s) IV Push once  dextrose 50% Injectable 12.5 Gram(s) IV Push once  dextrose 50% Injectable 25 Gram(s) IV Push once  dextrose 50% Injectable 25 Gram(s) IV Push once  docusate sodium 100 milliGRAM(s) Oral two times a day  heparin  Injectable 5000 Unit(s) SubCutaneous every 8 hours  hydrochlorothiazide 12.5 milliGRAM(s) Oral daily  insulin lispro (HumaLOG) corrective regimen sliding scale   SubCutaneous three times a day before meals  insulin lispro (HumaLOG) corrective regimen sliding scale   SubCutaneous at bedtime  losartan 25 milliGRAM(s) Oral daily  piperacillin/tazobactam IVPB. 3.375 Gram(s) IV Intermittent every 8 hours  senna 2 Tablet(s) Oral at bedtime  vancomycin  IVPB 750 milliGRAM(s) IV Intermittent every 24 hours    MEDICATIONS  (PRN):  dextrose Gel 1 Dose(s) Oral once PRN Blood Glucose LESS THAN 70 milliGRAM(s)/deciliter  dextrose Gel 1 Dose(s) Oral once PRN Blood Glucose LESS THAN 70 milliGRAM(s)/deciliter  glucagon  Injectable 1 milliGRAM(s) IntraMuscular once PRN Glucose LESS THAN 70 milligrams/deciliter  glucagon  Injectable 1 milliGRAM(s) IntraMuscular once PRN Glucose LESS THAN 70 milligrams/deciliter

## 2018-03-09 NOTE — PHYSICAL THERAPY INITIAL EVALUATION ADULT - PERTINENT HX OF CURRENT PROBLEM, REHAB EVAL
Pt is an 81 y/o male PMH T2DM, Prostate CA s/p prostatectomy (~20 yrs ago) s/p AUS p/w urinary retention. He has baseline urinary incontinence after his prostatectomy and has a incontinence control device implant which he uses to assist with urination. Patient s/p AUS explant found to have asymptomatic bradycardia and thickened MV on TTE 07/2017. Decmeber 2017 patient s/p AUS placement.   Continued...

## 2018-03-12 LAB
CULTURE RESULTS: SIGNIFICANT CHANGE UP
SPECIMEN SOURCE: SIGNIFICANT CHANGE UP

## 2018-03-13 ENCOUNTER — APPOINTMENT (OUTPATIENT)
Age: 83
End: 2018-03-13

## 2018-03-13 ENCOUNTER — APPOINTMENT (OUTPATIENT)
Dept: UROLOGY | Facility: CLINIC | Age: 83
End: 2018-03-13

## 2018-03-13 LAB
CULTURE RESULTS: SIGNIFICANT CHANGE UP
SPECIMEN SOURCE: SIGNIFICANT CHANGE UP

## 2018-03-23 ENCOUNTER — APPOINTMENT (OUTPATIENT)
Dept: UROLOGY | Facility: CLINIC | Age: 83
End: 2018-03-23

## 2018-07-16 PROBLEM — E11.9 TYPE 2 DIABETES MELLITUS WITHOUT COMPLICATIONS: Chronic | Status: ACTIVE | Noted: 2017-07-03

## 2019-10-04 NOTE — PHYSICAL THERAPY INITIAL EVALUATION ADULT - PHYSICAL ASSIST/NONPHYSICAL ASSIST: SUPINE/SIT, REHAB EVAL
Patient Name: Michelle Livingston  Procedure Date: 6/2/2017 8:10 AM  MRN: [id number]  Account Number: [id number]  YOB: 1954  Admit Type: Outpatient  Age: 63  Gender: Female  Race: White  Attending MD: Tyrone Self MD  Grafts or Implants: Grafts or Implants Not Applicable  Procedure:            Colonoscopy  Indications:          Screening for colorectal malignant neoplasm of the                        colon but family history of other malignancies.  Providers:            MD Aramis  Referring MD:         Elliot Leal Md  Sedation:             Monitored Anesthesia Care  Procedure:       Pre-Anesthesia Assessment:       - Prior to the procedure, a History and Physical was performed, and       patient medications, allergies and sensitivities were reviewed. The       patient's tolerance of previous anesthesia was reviewed.       A History and Physical was performed prior to the procedure. Patient       medications and allergies were reviewed. The risks and benefits of the       procedure and sedation options were discussed. Questions were answered       and informed consent was obtained. Patient identification and proposed       procedure were verified. The patient was deemed in satisfactory       condition to undergo the procedure. The heart rate, respiratory rate,       oxygen saturations, blood pressure and response to sedation were       monitored throughout the procedure.       The endoscope was passed under direct vision. The Colonoscope was       introduced through the anus and advanced to the ascending colon. The       physical status of the patient was re-assessed after the procedure. The       colonoscopy was technically difficult and complex due to restricted       mobility of the colon. We will became aware of the fact that the       procedure may be difficult when we encountered problems negotiating the       rectosigmoid. We knew that the patient had had a complete hysterectomy       and  appendectomy and therefore had potential for adhesions and       explaining down the colon with the reduction in its mobility. We       initially employed the adult colonoscope and were able to negotiate the       rectosigmoid and advanced it to the ascending colon. In the ascending       colon, no matter what maneuvers we tried, the scope could not advance       forward. We exchanged the colonoscope and employed to the small bowel       which has the advantage of being thin and more flexible. In spite of       this again, when we reached the ascending colon, we could not go       further. I decision was therefore made to abandon the procedure at this       point. We suctioned as much as we could retrograde demonstrating that it       was normal. She had minor internal hemorrhoids. She tolerated the       procedure well and was sent to recovery room in good condition. There       were no biopsies obtained and therefore no specimens collected.  Scope Withdrawal Time 0 hours 7 minutes 7 seconds  Findings:       See the description of procedure above       The examined colon to the ascending colon was normal. She had minor       internal hemorrhoids. L.  Impression:       - The ascending colon is normal.       - No specimens collected.  Recommendation:       IF PATIENT HAS SYMPTOMS.       - Discharge patient to home (ambulatory).       - Repeat colonoscopy in 10 years for screening purposes.  Complications:        No immediate complications.  Estimated Blood Loss: Estimated blood loss: none.  Tyrone Self M.D.  Tyrone Self MD  6/2/2017 4:31:58 PM  Number of Addenda: 0       No Specimens removed during this procedure unless otherwise noted.     1 person assist

## 2020-04-19 ENCOUNTER — EMERGENCY (EMERGENCY)
Facility: HOSPITAL | Age: 85
LOS: 1 days | Discharge: HOME | End: 2020-04-19
Attending: EMERGENCY MEDICINE
Payer: MEDICARE

## 2020-04-19 DIAGNOSIS — Z98.890 OTHER SPECIFIED POSTPROCEDURAL STATES: Chronic | ICD-10-CM

## 2020-04-19 DIAGNOSIS — Z90.79 ACQUIRED ABSENCE OF OTHER GENITAL ORGAN(S): Chronic | ICD-10-CM

## 2020-04-19 PROCEDURE — 99284 EMERGENCY DEPT VISIT MOD MDM: CPT | Mod: GC

## 2020-04-19 PROCEDURE — 54450 PREPUTIAL STRETCHING: CPT

## 2020-04-19 NOTE — ED PROVIDER NOTE - CARDIOVASCULAR NEGATIVE STATEMENT, MLM
20-30 second left sided myoclonic jerking with left gaze deviation. This could explain her waxing and waning mental status   -STAT CTH shows innterval development of a small region of decreased attenuation in the left frontal lobe superiorly concerning for possible acute/recent infarction.    -Preliminary EEG shows diffuse slowing with no epileptiform discharges    no chest pain and no edema.

## 2020-04-19 NOTE — ED PROVIDER NOTE - NSFOLLOWUPINSTRUCTIONS_ED_ALL_ED_FT
You were seen in the Emergency Department (ED) for fever and malaise. You did not meet testing criteria for COVID. You were not found to be in urinary retention. We were not able to obtain a urine sample during your ED visit.     Please follow up with your primary care doctor in the next 72 hours. Please take tylenol for your fever.      Please return to the ED if you experience any new or concerning symptoms, such as: chest pain, difficulty breathing, passing out, unable to eat or drink, unable to move or feel part of your body, fever, chills.     Thank you for visiting a [facility] ED.

## 2020-04-19 NOTE — ED PROVIDER NOTE - OBJECTIVE STATEMENT
85 M Toshilpianese speaking, hx DM, dementia, HLD, prostate CA s/p prostatectomy BIBEMS from [facility] states he came to hospital because "I'm sick", states sometimes feels feverish, has difficuilty walking. Per NH notes pt has been with poor appetite, weak, with a fever unclear chronicity but per EMS has been for a few days. Currently denies SoB, CP, n/v, cough, and states nothing is bothering him at this moment but occasionally has foot pain.    Pt speaks natively [place]? per reyesones interpretor [id number].    [facility] [id number] 85 M Ramírez speaking, hx DM, dementia, HLD, prostate CA s/p prostatectomy BIBEMS from [facility] states he came to hospital because "I'm sick", states sometimes feels feverish, has difficuilty walking. Per NH notes pt has been with poor appetite, weak, with a fever unclear chronicity but per EMS has been for a few days. Currently denies SoB, CP, n/v, cough, and states nothing is bothering him at this moment but occasionally has foot pain.      Used Ramírez interpretor [id number] 85 M Ramírez speaking, hx DM, dementia, HLD, prostate CA s/p prostatectomy BIBEMS from [facility] states he came to hospital because "I'm sick", states sometimes feels feverish, has difficuilty walking. Per NH notes pt has been with poor appetite, weak, with a fever unclear chronicity but per EMS has been for a few days. Currently denies SoB, CP, n/v, cough, and states nothing is bothering him at this moment but occasionally has foot pain.    Used Ramírez interpretor [id number]

## 2020-04-19 NOTE — ED PROVIDER NOTE - CLINICAL SUMMARY MEDICAL DECISION MAKING FREE TEXT BOX
84 yo M p/w fever/weakness/poor appetite from NH, on exam nontoxic appearing afebrile. Low supsicion for bacterial sepsis, sx may be due to viral illness or toxic metabolic abnormality given poor appetite, or urinary infection. Plan: CXR, labs/cultures, reassess

## 2020-04-19 NOTE — ED PROVIDER NOTE - PATIENT PORTAL LINK FT
You can access the FollowMyHealth Patient Portal offered by [facility] by registering at the following website: [email or web address]. By joining [employer] [facility]’s FollowMyHealth portal, you will also be able to view your health information using other applications (apps) compatible with our system.

## 2020-04-19 NOTE — ED PROVIDER NOTE - ATTENDING CONTRIBUTION TO CARE
*** PATIENT SEEN DURING COVID PANDEMIC -- [place] IN STATE OF EMERGENCY -- HOSPITAL RESOURCES ARE CRITICALLY OVERWHELMED -- NORMAL DECISION MAKING PROCESS IS SIGNIFICANTLY ALTERED -- RISK/BENEFIT ANALYSIS FAVORS DISCHARGE/OUTPATIENT MANAGEMENT IN MANY CASES THAT WOULD NORMALLY BE ADMITTED ***     85M, pmh dm, htn, prostate ca, dementia, biba with chief complaint of fever, diff walking, decreased appetite per nursing home paperwork. pt denies any complaints. specifically denies cough, congestion, cp, sob, abd pain, n/v.    PE: NAD, NCAT, MMM, Trachea midline, Normal conjunctiva, lungs CTAB, S1/S2 RRR, Normal perfusion, 2+ radial pulses bilat, Abdomen Soft, NTND, No rebound/guarding, No LE edema, No deformity of extremities, No rashes,  No focal motor or sensory deficits.     Pt well appearing. VS in ED unremarkable. Most likely viral illness, consider covid-19 as possible source. Will check cxr eval for consolidation. Check CBC eval for anemia, leukocytosis, cmp eval for metabolic derangement. Urine. Re-eval. - Ry Ventura MD

## 2020-04-19 NOTE — ED PROVIDER NOTE - PROGRESS NOTE DETAILS
Bladder scan 383ml at bedside exam.   Shelly Yu D.O. (PGY-1) given urine retention, elevated cr, urology contacted again to place monte. coming to see patient. - Ry Ventura MD Elizabeth Sims M.D. Resident  Patient voided in to diaper, per urology PA pt has hx of prostate CA sp prostatectomy sp artificial urinary sphincter and monte at ursing home likely placed to keep patient dry and not for urinary retention, thus no indication for Monte at this time Elizabeth Sims M.D. Resident  Moderate blood and urine noted in diaper, Elizabeth Sims M.D. Resident  Moderate blood and urine noted in diaper, 200 CC post void, less than 6-700CC per urology note. Urology aware of new above mentioned findings, states pt is ok for discharge without Lemus catheter. Ambulatee arranged. Elizabeth Sims M.D. Resident  Spoke to son who explained to patient that he needs to keep condom cath on for urine sample. Pt understood and condom cath placed. Elizabeth Sims M.D. Resident  Pt urinated into diaper, no urine collected by condom cath after 6th attempt with condom cath. Spoke to nursing home RN director Shantel, aware pt is returning to facility with no monte and scant clots/blood in diaper due to traumatic monte. Ambulette arranged.

## 2020-04-20 VITALS
OXYGEN SATURATION: 95 % | SYSTOLIC BLOOD PRESSURE: 156 MMHG | TEMPERATURE: 98 F | RESPIRATION RATE: 20 BRPM | HEART RATE: 87 BPM | DIASTOLIC BLOOD PRESSURE: 51 MMHG

## 2020-04-20 VITALS
HEART RATE: 69 BPM | RESPIRATION RATE: 20 BRPM | TEMPERATURE: 99 F | SYSTOLIC BLOOD PRESSURE: 170 MMHG | DIASTOLIC BLOOD PRESSURE: 91 MMHG | OXYGEN SATURATION: 98 %

## 2020-04-20 PROBLEM — R32 UNSPECIFIED URINARY INCONTINENCE: Chronic | Status: ACTIVE | Noted: 2017-07-03

## 2020-04-20 PROBLEM — C61 MALIGNANT NEOPLASM OF PROSTATE: Chronic | Status: ACTIVE | Noted: 2017-07-03

## 2020-04-20 PROBLEM — E78.5 HYPERLIPIDEMIA, UNSPECIFIED: Chronic | Status: ACTIVE | Noted: 2017-01-30

## 2020-04-20 PROBLEM — F03.90 UNSPECIFIED DEMENTIA WITHOUT BEHAVIORAL DISTURBANCE: Chronic | Status: ACTIVE | Noted: 2017-11-27

## 2020-04-20 PROBLEM — I10 ESSENTIAL (PRIMARY) HYPERTENSION: Chronic | Status: ACTIVE | Noted: 2017-01-30

## 2020-04-20 LAB
ALBUMIN SERPL ELPH-MCNC: 4 G/DL — SIGNIFICANT CHANGE UP (ref 3.3–5)
ALP SERPL-CCNC: 78 U/L — SIGNIFICANT CHANGE UP (ref 40–120)
ALT FLD-CCNC: 23 U/L — SIGNIFICANT CHANGE UP (ref 10–45)
ANION GAP SERPL CALC-SCNC: 13 MMOL/L — SIGNIFICANT CHANGE UP (ref 5–17)
AST SERPL-CCNC: 43 U/L — HIGH (ref 10–40)
BASOPHILS # BLD AUTO: 0 K/UL — SIGNIFICANT CHANGE UP (ref 0–0.2)
BASOPHILS NFR BLD AUTO: 0 % — SIGNIFICANT CHANGE UP (ref 0–2)
BILIRUB SERPL-MCNC: 0.8 MG/DL — SIGNIFICANT CHANGE UP (ref 0.2–1.2)
BUN SERPL-MCNC: 39 MG/DL — HIGH (ref 7–23)
CALCIUM SERPL-MCNC: 9.8 MG/DL — SIGNIFICANT CHANGE UP (ref 8.4–10.5)
CHLORIDE SERPL-SCNC: 99 MMOL/L — SIGNIFICANT CHANGE UP (ref 96–108)
CO2 SERPL-SCNC: 27 MMOL/L — SIGNIFICANT CHANGE UP (ref 22–31)
CREAT SERPL-MCNC: 1.49 MG/DL — HIGH (ref 0.5–1.3)
EOSINOPHIL # BLD AUTO: 0 K/UL — SIGNIFICANT CHANGE UP (ref 0–0.5)
EOSINOPHIL NFR BLD AUTO: 0 % — SIGNIFICANT CHANGE UP (ref 0–6)
GLUCOSE SERPL-MCNC: 142 MG/DL — HIGH (ref 70–99)
HCT VFR BLD CALC: 49.8 % — SIGNIFICANT CHANGE UP (ref 39–50)
HGB BLD-MCNC: 16.2 G/DL — SIGNIFICANT CHANGE UP (ref 13–17)
LYMPHOCYTES # BLD AUTO: 1.39 K/UL — SIGNIFICANT CHANGE UP (ref 1–3.3)
LYMPHOCYTES # BLD AUTO: 25.4 % — SIGNIFICANT CHANGE UP (ref 13–44)
MCHC RBC-ENTMCNC: 29.7 PG — SIGNIFICANT CHANGE UP (ref 27–34)
MCHC RBC-ENTMCNC: 32.5 GM/DL — SIGNIFICANT CHANGE UP (ref 32–36)
MCV RBC AUTO: 91.2 FL — SIGNIFICANT CHANGE UP (ref 80–100)
MONOCYTES # BLD AUTO: 0.84 K/UL — SIGNIFICANT CHANGE UP (ref 0–0.9)
MONOCYTES NFR BLD AUTO: 15.3 % — HIGH (ref 2–14)
NEUTROPHILS # BLD AUTO: 3.07 K/UL — SIGNIFICANT CHANGE UP (ref 1.8–7.4)
NEUTROPHILS NFR BLD AUTO: 54.2 % — SIGNIFICANT CHANGE UP (ref 43–77)
PLATELET # BLD AUTO: 158 K/UL — SIGNIFICANT CHANGE UP (ref 150–400)
POTASSIUM SERPL-MCNC: 4.3 MMOL/L — SIGNIFICANT CHANGE UP (ref 3.5–5.3)
POTASSIUM SERPL-SCNC: 4.3 MMOL/L — SIGNIFICANT CHANGE UP (ref 3.5–5.3)
PROT SERPL-MCNC: 7.7 G/DL — SIGNIFICANT CHANGE UP (ref 6–8.3)
RBC # BLD: 5.46 M/UL — SIGNIFICANT CHANGE UP (ref 4.2–5.8)
RBC # FLD: 12.5 % — SIGNIFICANT CHANGE UP (ref 10.3–14.5)
SODIUM SERPL-SCNC: 139 MMOL/L — SIGNIFICANT CHANGE UP (ref 135–145)
WBC # BLD: 5.49 K/UL — SIGNIFICANT CHANGE UP (ref 3.8–10.5)
WBC # FLD AUTO: 5.49 K/UL — SIGNIFICANT CHANGE UP (ref 3.8–10.5)

## 2020-04-20 PROCEDURE — 71045 X-RAY EXAM CHEST 1 VIEW: CPT

## 2020-04-20 PROCEDURE — 99284 EMERGENCY DEPT VISIT MOD MDM: CPT | Mod: 25

## 2020-04-20 PROCEDURE — 80053 COMPREHEN METABOLIC PANEL: CPT

## 2020-04-20 PROCEDURE — 71045 X-RAY EXAM CHEST 1 VIEW: CPT | Mod: 26

## 2020-04-20 PROCEDURE — 85027 COMPLETE CBC AUTOMATED: CPT

## 2020-04-20 PROCEDURE — 87040 BLOOD CULTURE FOR BACTERIA: CPT

## 2020-04-20 RX ORDER — SODIUM CHLORIDE 9 MG/ML
1000 INJECTION INTRAMUSCULAR; INTRAVENOUS; SUBCUTANEOUS ONCE
Refills: 0 | Status: COMPLETED | OUTPATIENT
Start: 2020-04-20 | End: 2020-04-20

## 2020-04-20 RX ADMIN — SODIUM CHLORIDE 1000 MILLILITER(S): 9 INJECTION INTRAMUSCULAR; INTRAVENOUS; SUBCUTANEOUS at 02:20

## 2020-04-20 NOTE — ED ADULT NURSE REASSESSMENT NOTE - NS ED NURSE REASSESS COMMENT FT1
Patient urination in sheets twice- MD Ventura aware- cleaned and changed into new sheets and gown with blanket provided.

## 2020-04-20 NOTE — PROGRESS NOTE ADULT - ASSESSMENT
84y/o male with paraphimosis s/p manual reduction, urethral erosion   -Unable to place monte  -Would recommend condom cath for urine collection to reduce further trauma given that pt is not in acute retention 86y/o male with paraphimosis s/p manual reduction, urethral erosion.  Patient with a history of Prostate Cancer s/p Prostatectomy 20 years ago.  C/B Urinary incontinence, patient had an artificial urinary sphincter placed and was removed ~ 2 years ago.  Patient is incontinent at baseline.  Per the Son, monte catheter likely placed at nursing home to keep him dry rather than for urinary retention.  Bladder scan of 383 initially, but patient able to void into diaper afterwards.  No urgent or emergent indication for cystoscopic placement of monte catheter at this time.    Would recommend continuing diaper for incontinence.  SAMANTHA likely related to dehydration.    -Unable to place monte, multiple attempts by nursing and Urology PA  -May monitor residual urines if needed, only would have a monte placed if extremely uncomfortable or retaining > 6-700 cc of urine.  -No further urologic intervention necessary, please re-consult as necessary  -If urine sample needed, would recommend condom catheter.

## 2020-04-20 NOTE — ED ADULT NURSE REASSESSMENT NOTE - NS ED NURSE REASSESS COMMENT FT1
received report from GABBY cooper. patient is resting comfortably in bed in no acute distress. patient denies pain. patient is Chinese speaking and understands some English. patient is AAOx1- disoriented to time, place and situation. patient lung sounds CTA bilaterally. cap refill <3sec. +2 radial pulses noted. abdomen is soft, non-tender, non-distended. skin intact. VSS. waiting for urology to see patient for possible urinary retention. GABBY Cooper from overnight states patient voided in diaper around 0650. call bell in reach. will continue to monitor. received report from GABBY cooper. patient is resting comfortably in bed in no acute distress. patient denies pain. patient is Chinese speaking and understands some English. patient is AAOx1- disoriented to time, place and situation. patient lung sounds CTA bilaterally. cap refill <3sec. +2 radial pulses noted. abdomen is soft, non-tender, non-distended. skin intact. VSS. waiting for urology to see patient for the second time for possible urinary retention. GABBY Cooper from overnight states patient voided in diaper around 0650. she states she has attempted monte catheter and so kate urology with no success. pending urine sample. call bell in reach. will continue to monitor.

## 2020-04-20 NOTE — ED ADULT NURSE REASSESSMENT NOTE - NS ED NURSE REASSESS COMMENT FT1
Patient's continuous movement causes condom cath to come off. Patient bed sheets appear wet from urine. MD performing bladder scan at this time.

## 2020-04-20 NOTE — ED ADULT NURSE REASSESSMENT NOTE - NS ED NURSE REASSESS COMMENT FT1
patient voided in diaper, blood noted to diaper, scant amount. blood noted to urethra. as per MD Hopper, probable from reduction of foreskin by urology over night. urology spoke with MD Hopper stating monte may not be beneficial for this patient due to history. patient is to be d/c. patient aware of plan of care. will continue to monitor. call bell in reach. patient denies pain.

## 2020-04-20 NOTE — ED ADULT NURSE REASSESSMENT NOTE - NS ED NURSE REASSESS COMMENT FT1
condom catheter applied, size 30. pending urinary drainage for UA,UC sample for 6th attempt. patient, family aware of plan of care.

## 2020-04-20 NOTE — ED ADULT NURSE NOTE - NSIMPLEMENTINTERV_GEN_ALL_ED
Implemented All Fall with Harm Risk Interventions:  [place] to call system. Call bell, personal items and telephone within reach. Instruct patient to call for assistance. Room bathroom lighting operational. Non-slip footwear when patient is off stretcher. Physically safe environment: no spills, clutter or unnecessary equipment. Stretcher in lowest position, wheels locked, appropriate side rails in place. Provide visual cue, wrist band, yellow gown, etc. Monitor gait and stability. Monitor for mental status changes and reorient to person, place, and time. Review medications for side effects contributing to fall risk. Reinforce activity limits and safety measures with patient and family. Provide visual clues: red socks.

## 2020-04-20 NOTE — ED ADULT NURSE NOTE - CAS ELECT INFOMATION PROVIDED
MD spoke with family and [facility] about d/c plan. papers given to EMS/DC instructions

## 2020-04-20 NOTE — ED ADULT NURSE REASSESSMENT NOTE - NS ED NURSE REASSESS COMMENT FT1
condom cath fell off patient, diaper was wet with sanguinous drainage. patient states "I need to pee." RN gave patient urinal with no output noted. MD Ruchi aware. to reassess patient. patient denies pain.

## 2020-04-20 NOTE — ED ADULT NURSE REASSESSMENT NOTE - NS ED NURSE REASSESS COMMENT FT1
2 RN attempted to straight cath patient, unable to obtain urine. Patient has unusual anatomy. MD Ventura aware- urology to be called. Report received from RN Roderick, patient breathing spontaneous and unlabored on room air, palpable pulses, skin color normal for ethnicity, no distress noted, resting in bed with bed locked and in lowest position with side rails up for safety. Patient educated to call bell system with call bell at beside. 2 RN attempted to straight cath patient, unable to obtain urine. Patient has unusual anatomy. MD Ventura aware- urology to be called.

## 2020-04-20 NOTE — ED ADULT NURSE NOTE - OBJECTIVE STATEMENT
85 y M arrived to the ED BIBEMS from nursing home c/o weakness and fever. As per EMs pt has been having generalized weakness and today started to have a fever. Pt Etta speaking,  at bedside states "its been hard for me to walk, I have weakness and I had a fever today." Pt denies chest pain, SOB, HA, N/V/D, abdominal pain, fever/chills, urinary symptoms, hematuria.  Peripheral pulses present b/l. Skin warm, dry and pink. Pt placed in position of comfort. Pt educated on call bell system and provided call bell. Bed in lowest position, wheels locked, appropriate side rails raised. Pt denies needs at this time.

## 2020-04-20 NOTE — PROGRESS NOTE ADULT - SUBJECTIVE AND OBJECTIVE BOX
UROLOGY PROGRESS NOTE:     Subjective: Urology called for assistance with straight cath after two unsuccessful attempts by RN. Also unable to reduce foreskin.  Pt brought in from nursing home with fever and weakness. Pt is able to void on his own. He denies abdominal pain or urinary symptoms.     Objective:  Vital signs  T(F): , Max: 98.2 (04-20-20 @ 00:00)  HR: 60 (04-20-20 @ 02:44)  BP: 115/63 (04-20-20 @ 02:44)  SpO2: 96% (04-20-20 @ 02:44)     I&O's Summary    Gen: NAD  Abd: Soft, NT, ND  : Uncircumcised. paraphimosis on exam manually reduced. Urethral meatus eroded through glans. Unsuccessful attempt at placing 18f coude and 14f silicone  Testes descended bilaterally.  Testes and epididymis nontender bilaterally.       Labs:  04-20  5.49  / 49.8  /1.49                           16.2   5.49  )-----------( 158      ( 20 Apr 2020 01:01 )             49.8     04-20    139  |  99  |  39<H>  ----------------------------<  142<H>  4.3   |  27  |  1.49<H>    Ca    9.8      20 Apr 2020 01:01    TPro  7.7  /  Alb  4.0  /  TBili  0.8  /  DBili  x   /  AST  43<H>  /  ALT  23  /  AlkPhos  78  04-20

## 2020-04-25 LAB
CULTURE RESULTS: SIGNIFICANT CHANGE UP
CULTURE RESULTS: SIGNIFICANT CHANGE UP
SPECIMEN SOURCE: SIGNIFICANT CHANGE UP
SPECIMEN SOURCE: SIGNIFICANT CHANGE UP

## 2020-09-18 NOTE — DISCHARGE NOTE ADULT - CARE PROVIDER_API CALL
Subjective:   Date of Visit: 9/18/20   ?   CHIEF COMPLAINT:   Chronic DVT???????   ?   Note:    67-year-old female with history of provoked DVT who was on anticoagulation but was subsequently discontinued by myself presents today for routine follow-up. Denies recurrent thrombosis.  Denies shortness of breath, lower extremity edema or pain. She denies chest discomfort or pain.  She has no complaints today.  Recent history of COVID-19 infection treated and has recovered fully.  No complaints today.    Review of Systems   Constitutional: Negative for activity change, appetite change, chills, fatigue, fever and unexpected weight change.   HENT: Negative for hearing loss, mouth sores, nosebleeds, sore throat, tinnitus, trouble swallowing and voice change.    Eyes: Negative for visual disturbance.   Respiratory: Negative for cough, chest tightness and shortness of breath.    Cardiovascular: Negative for chest pain, palpitations and leg swelling.   Gastrointestinal: Negative for abdominal pain, anal bleeding, blood in stool, constipation, diarrhea, nausea and vomiting.   Genitourinary: Negative for dysuria, frequency, hematuria, pelvic pain, vaginal bleeding and vaginal pain.   Musculoskeletal: Negative for arthralgias, back pain, joint swelling and neck pain.   Skin: Negative for color change, pallor, rash and wound.   Allergic/Immunologic: Negative for immunocompromised state.   Neurological: Negative for dizziness, tremors, syncope, speech difficulty, weakness, light-headedness and headaches.   Hematological: Negative for adenopathy. Does not bruise/bleed easily.   Psychiatric/Behavioral: Negative for agitation, confusion, decreased concentration, hallucinations and sleep disturbance. The patient is not nervous/anxious.        ?   PAST MEDICAL HISTORY:   Past Medical History:   Diagnosis Date    GERD (gastroesophageal reflux disease)     History of DVT (deep vein thrombosis)     left leg    History of renal failure      Hypertension     ?     PAST SURGICAL HISTORY:   Past Surgical History:   Procedure Laterality Date    CARPAL TUNNEL RELEASE Left     CATARACT EXTRACTION Bilateral 03/2018    HYSTERECTOMY      OOPHORECTOMY      ROTATOR CUFF REPAIR Right     spur removal  Right     small toe      ?   ALLERGIES:   Allergies as of 09/18/2020 - Reviewed 09/18/2020   Allergen Reaction Noted    Bactrim [sulfamethoxazole-trimethoprim]  02/01/2015    Sulfa (sulfonamide antibiotics) Hives 09/30/2014      ?   MEDICATIONS:?   Outpatient Medications Marked as Taking for the 9/18/20 encounter (Office Visit) with Adalberto Lazo MD   Medication Sig Dispense Refill    albuterol (PROVENTIL/VENTOLIN HFA) 90 mcg/actuation inhaler Inhale 2 puffs into the lungs every 6 (six) hours as needed for Wheezing. 18 g 1    aspirin (ECOTRIN) 81 MG EC tablet Take 81 mg by mouth once daily.      furosemide (LASIX) 20 MG tablet Take 1 tablet (20 mg total) by mouth daily as needed. 90 tablet 3    gabapentin (NEURONTIN) 300 MG capsule Take 1 capsule (300 mg total) by mouth 3 (three) times daily. 270 capsule 3    HYDROcodone-acetaminophen (NORCO)  mg per tablet Take 1 tablet by mouth 2 (two) times daily. 60 tablet 0    lisinopriL-hydrochlorothiazide (PRINZIDE,ZESTORETIC) 20-25 mg Tab Take 1 tablet by mouth once daily. 90 tablet 3    multivitamin-Ca-iron-minerals 27-0.4 mg Tab       omeprazole (PRILOSEC) 20 MG capsule Take 1 capsule (20 mg total) by mouth once daily. 90 capsule 3      ?   SOCIAL HISTORY:?   Social History     Tobacco Use    Smoking status: Never Smoker    Smokeless tobacco: Never Used   Substance Use Topics    Alcohol use: No     Alcohol/week: 0.0 standard drinks        ?   FAMILY HISTORY:   family history includes Breast cancer in her cousin and sister; COPD in her sister; Colon cancer in her mother; Coronary artery disease in her mother and sister; Diabetes in her brother and sister; Hypertension in her brother,  father, mother, and sister; Kidney failure in her brother and sister; Lung cancer in her brother and father.   ?     Objective:      Physical Exam  Constitutional:       General: She is not in acute distress.     Appearance: She is well-developed. She is not ill-appearing or toxic-appearing.   HENT:      Head: Normocephalic and atraumatic.      Mouth/Throat:      Pharynx: No oropharyngeal exudate.   Eyes:      General: No scleral icterus.        Right eye: No discharge.         Left eye: No discharge.      Conjunctiva/sclera: Conjunctivae normal.      Pupils: Pupils are equal, round, and reactive to light.   Neck:      Musculoskeletal: Normal range of motion and neck supple.      Thyroid: No thyromegaly.   Cardiovascular:      Rate and Rhythm: Normal rate and regular rhythm.      Heart sounds: No murmur.   Pulmonary:      Effort: Pulmonary effort is normal. No respiratory distress.      Breath sounds: Normal breath sounds.   Chest:      Chest wall: No tenderness.   Abdominal:      General: Bowel sounds are normal. There is no distension.      Palpations: Abdomen is soft. There is no mass.      Tenderness: There is no abdominal tenderness. There is no guarding or rebound.   Musculoskeletal: Normal range of motion.         General: No tenderness.   Lymphadenopathy:      Cervical: No cervical adenopathy.      Right cervical: No superficial cervical adenopathy.     Left cervical: No superficial cervical adenopathy.      Upper Body:      Right upper body: No supraclavicular or pectoral adenopathy.      Left upper body: No supraclavicular or pectoral adenopathy.      Lower Body: No right inguinal adenopathy. No left inguinal adenopathy.   Skin:     General: Skin is warm and dry.      Capillary Refill: Capillary refill takes 2 to 3 seconds.      Coloration: Skin is not pale.      Findings: No erythema or rash.   Neurological:      Mental Status: She is alert and oriented to person, place, and time.      Cranial Nerves: No  cranial nerve deficit.      Sensory: No sensory deficit.   Psychiatric:         Behavior: Behavior normal. Behavior is cooperative.         Judgment: Judgment normal.         ?   Vitals:    09/18/20 1130   BP: 136/71   Pulse: 73   Resp: 16   Temp: 97.7 °F (36.5 °C)      ?     ECOG SCORE               ?   Laboratory:  ?   Lab Visit on 09/18/2020   Component Date Value Ref Range Status    WBC 09/18/2020 7.54  3.90 - 12.70 K/uL Final    RBC 09/18/2020 4.93  4.00 - 5.40 M/uL Final    Hemoglobin 09/18/2020 13.0  12.0 - 16.0 g/dL Final    Hematocrit 09/18/2020 42.0  37.0 - 48.5 % Final    Mean Corpuscular Volume 09/18/2020 85  82 - 98 fL Final    Mean Corpuscular Hemoglobin 09/18/2020 26.4* 27.0 - 31.0 pg Final    Mean Corpuscular Hemoglobin Conc 09/18/2020 31.0* 32.0 - 36.0 g/dL Final    RDW 09/18/2020 14.6* 11.5 - 14.5 % Final    Platelets 09/18/2020 218  150 - 350 K/uL Final    MPV 09/18/2020 10.8  9.2 - 12.9 fL Final    Immature Granulocytes 09/18/2020 0.3  0.0 - 0.5 % Final    Gran # (ANC) 09/18/2020 4.8  1.8 - 7.7 K/uL Final    Immature Grans (Abs) 09/18/2020 0.02  0.00 - 0.04 K/uL Final    Lymph # 09/18/2020 2.2  1.0 - 4.8 K/uL Final    Mono # 09/18/2020 0.5  0.3 - 1.0 K/uL Final    Eos # 09/18/2020 0.1  0.0 - 0.5 K/uL Final    Baso # 09/18/2020 0.03  0.00 - 0.20 K/uL Final    nRBC 09/18/2020 0  0 /100 WBC Final    Gran% 09/18/2020 63.0  38.0 - 73.0 % Final    Lymph% 09/18/2020 28.5  18.0 - 48.0 % Final    Mono% 09/18/2020 6.9  4.0 - 15.0 % Final    Eosinophil% 09/18/2020 1.2  0.0 - 8.0 % Final    Basophil% 09/18/2020 0.4  0.0 - 1.9 % Final    Differential Method 09/18/2020 Automated   Final    Sodium 09/18/2020 142  136 - 145 mmol/L Final    Potassium 09/18/2020 4.8  3.5 - 5.1 mmol/L Final    Chloride 09/18/2020 104  95 - 110 mmol/L Final    CO2 09/18/2020 31* 23 - 29 mmol/L Final    Glucose 09/18/2020 106  70 - 110 mg/dL Final    BUN, Bld 09/18/2020 19  8 - 23 mg/dL Final     Creatinine 09/18/2020 0.9  0.5 - 1.4 mg/dL Final    Calcium 09/18/2020 10.4  8.7 - 10.5 mg/dL Final    Total Protein 09/18/2020 8.1  6.0 - 8.4 g/dL Final    Albumin 09/18/2020 4.3  3.5 - 5.2 g/dL Final    Total Bilirubin 09/18/2020 0.4  0.1 - 1.0 mg/dL Final    Alkaline Phosphatase 09/18/2020 112  55 - 135 U/L Final    AST 09/18/2020 16  10 - 40 U/L Final    ALT 09/18/2020 14  10 - 44 U/L Final    Anion Gap 09/18/2020 7* 8 - 16 mmol/L Final    eGFR if African American 09/18/2020 >60  >60 mL/min/1.73 m^2 Final    eGFR if non African American 09/18/2020 >60  >60 mL/min/1.73 m^2 Final      ?   Tumor markers   ?   ?   Imaging: Mammo Digital Screening Bilat w/ Michel  Narrative: Result:  Mammo Digital Screening Bilat w/ Michel    History:  Patient is 66 y.o. and is seen for a screening mammogram.    Films Compared:  Compared to: 07/27/2018 Mammo Digital Screening Bilat with   Tomosynthesis_CAD and 03/15/2017 Mammo Digital Screening Bilat with   Tomosynthesis_CAD    Findings:  Computer-aided detection was utilized in the interpretation of this   examination. This procedure was performed using tomosynthesis.       The breasts have scattered areas of fibroglandular density. There is no   evidence of suspicious masses, microcalcifications or architectural   distortion.  Impression: No mammographic evidence of malignancy.    BI-RADS Category 1: Negative    Recommendation:  Routine screening mammogram in 1 year is recommended.     Your estimated lifetime risk of breast cancer (to age 85) based on   Tyrer-Cuzick risk assessment model is Tyrer-Cuzick: 9.83 %. According to   the American Cancer Society, patients with a lifetime breast cancer risk   of 20% or higher might benefit from supplemental screening tests.     ?      Pathology:  Pathology Results  (Last 10 years)    None           ?   Assessment/Plan:       Chronic deep vein thrombosis (DVT) of femoral vein of left lower extremity  Of anticoagulation since September  of 2019, no recurrent thrombosis since then.  Reviewed labs, no concerning cytopenia, no need for anticoagulation at this time.    Discussed implications of COVID-19 coagulopathy and advised that she watch out for worsening shortness of breath, persistent headache or swelling and pain of extremities as these may be indicators of recurrent thrombosis.  She knows to contact us sooner if needed otherwise will plan on seeing her back in 6 months.  Chronic deep vein thrombosis (DVT) of femoral vein of left lower extremity  -     CBC auto differential; Future; Expected date: 09/18/2020  -     Comprehensive metabolic panel; Future; Expected date: 09/18/2020      Recommend screening colonoscopy, patient to think about it.  Up-to-date with screening mammogram with most recent in February of 2020 a was unremarkable.  She be due in February of 2021.    Follow-Up: No follow-ups on file.    JAY COTTRELL Md., Ph.D  Hematology & Oncology Department  Phone #: [phone]     Uriel King), Urology  [address] [place] [id number]  Phone: [phone]  Fax: [phone] Uriel King), Urology  [address] [place] [id number]  Phone: [phone]  Fax: [phone]    Marco Antonio Roberts  [address] [facility] [facility], [place] [address]  Phone: [phone]  Fax: (       -

## 2021-03-19 NOTE — PHYSICAL THERAPY INITIAL EVALUATION ADULT - WEIGHT-BEARING RESTRICTIONS: SIT/STAND, REHAB EVAL
DISPLAY PLAN FREE TEXT DISPLAY PLAN FREE TEXT DISPLAY PLAN FREE TEXT DISPLAY PLAN FREE TEXT DISPLAY PLAN FREE TEXT full weight-bearing

## 2021-09-13 NOTE — BRIEF OPERATIVE NOTE - ANTIBIOTIC PROTOCOL
Additional Notes: Patient was specifically asked by me at the end of the visit if there were any other question, concerns, or any other areas or lesions that patient would like to me to check or discuss and if so those were listed in the impressions.  Patient had ample opportunity for all questions and concerns to be addressed. Detail Level: Simple Additional Notes: Patient was specifically asked by me at the end of the visit if there were any other question, concerns, or any other areas or lesions that patient would like to me to check or discuss and if so those were listed in the impressions.  Patient had ample opportunity for all questions and concerns to be addressed. vancomycin/gentamicin/Exception to protocol

## 2022-06-15 NOTE — PRE-OP CHECKLIST - HEIGHT IN INCHES
From: Erwin Blank  To: Donna Manuel  Sent: 6/15/2022 9:32 AM CDT  Subject: Low Globulin    Hello! I recently had a medical \"exam\" as part of the process of applying for [employer]. During this exam, they did blood and urine analysis. It was identified that my Globulin level was low (1.6) during blood chemistries analysis. Curious if there is anything more I/we should do at this time? I do have an appointment scheduled for Sept. so maybe we just discuss then if not urgent. [place], if another should be conducted and fasting is needed, just let me know in advance. Thank you   6

## 2022-06-30 NOTE — PROGRESS NOTE ADULT - ASSESSMENT
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
83M with AUS erosion  POD #1s/p AUS explantation   - cont antibiotics  f/u labs  augmentin x 2 weeks  dc planning today with monte  DVT prophylaxis
83M with AUS erosion POD #2s/p AUS explantation   - cont antibiotics  -f/u am labs   -dc planning today with monte  -DVT prophylaxis    -Awaiting PT evaluation
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 In July 2017, he was noted to have intermittent CHB and PPM implantation was recommended at that time. However, the patient and his family opted against PPM implantation. He was also evaluated for MitraClip, but opted against further consideration of this procedure also. EP reconsulted today, told by Urology team that patient and Health Care Proxy Lorenza are now requesting to have PPM implanted.
AUS erosion  - EP recs appreciated  - Med recs appreciated  - OOB/ PT  - OR planning for removal of AUS Wed
AUS erosion  - EP recs appreciated  - Med recs appreciated  - OOB/ PT  - OR planning for removal of AUS this week
AUS erosion  - EP recs appreciated  - Med recs appreciated  - OOB/ PT  - cont antibiotics  -vanc trough today  - f/u labs  - OR planning for removal of AUS 3/7
AUS erosion  - F/u EP  - f/u medicine  - f/u urine culture  - OOB/ PT  - OR planning for removal of AUS this week
84 yo cantonese speaking man w/ hx of HTN, HLD, DM2, hx of CHB refused PPM in past, severe mitral regurg, prostate CA 20 years ago s/p prostatectomy w/ artificial urinary sphincter (AUS) placed in 1999 removed (7/17), replaced 12/17 now p/w urinary retention in setting of erosion of AUS, here for removal of AUS.
No

## 2023-01-20 NOTE — ED ADULT NURSE NOTE - PMH
13.3   25.15 )-----------( 524      ( 20 Jan 2023 17:12 )             42.3     PT/INR - ( 20 Jan 2023 17:12 )   PT: 24.2 sec;   INR: 2.07 ratio         PTT - ( 20 Jan 2023 17:12 )  PTT:38.0 sec  01-20    135  |  101  |  28<H>  ----------------------------<  174<H>  3.9   |  24  |  1.95<H>    Ca    9.5      20 Jan 2023 17:12    TPro  7.3  /  Alb  2.0<L>  /  TBili  0.6  /  DBili  x   /  AST  14<L>  /  ALT  19  /  AlkPhos  88  01-20    Type and Screen Antibody Screen: NEG (01-20 @ 17:17)  < from: CT Abdomen and Pelvis No Cont (01.20.23 @ 19:41) >    BONES: Degenerative changes of bone. Bilateral hip prostheses.    IMPRESSION:  Very large ventral hernia containing small and large bowel with   incomplete imaging of the bowel on this study. Collapsed small bowel in   the right lower quadrant is highly concerning for small bowel obstruction   as described above. The exact point of transition is not seen with   certainty although this may be in the mid abdomen superior to the   bladder.Mild ascites is present in the right side of the abdomen which   was not seen previously. A closed loop obstruction or bowel ischemia in   the setting of very dense calcifications cannot be excluded on the basis   of this examination. This was discussed with Dr. Washington in the emergency   room at 8:04 PM on 1/20/2023.    6 mm nodular density in the left lower lobe which was not seen   previously. This may be exaggerated by overlapping structures represent   mucous plugging. True nodule cannot entirely be excluded.. Recommend   follow-up chest CT scan in 3 months. This was also discussed with Dr. Washington in the emergency room.    Higher attenuation posteriorly in the bladder which may be due to   artifact. Blood in the bladder is considered much less likely although   clinical correlation is suggested.    Mild esophageal distention. The stomach is not significantly dilated.        --- End of Report ---            MARGARITO WEAVER MD; Attending Radiologist  This document has been electronically signed. Jan 20 2023  8:16PM    < end of copied text > Dementia    Diabetes mellitus  no meds needed now, recent wt loss  DM (diabetes mellitus)    Hyperlipidemia    Hypertension    Prostate CA    Urinary incontinence

## 2023-04-17 NOTE — ED PROCEDURE NOTE - [facility]PROC ED PHYSICIAN PRESENCE1
-- DO NOT REPLY / DO NOT REPLY ALL --  -- Message is from Engagement Center Operations ([facility]) --    General Patient Message: Patient is calling to make his new patient appointment with Dr Eloy Joshi in Endcrinology but the referral that was initiated on 2/20/2023 is still in a pending status. Can someone please follow up with patient to advise when the referral will be authorized as Dr Joshi requires a referral to schedule an appointment?    Caller Information       Type Contact Phone/Fax    04/17/2023 03:12 PM CDT Phone (Incoming) Fazal Stapleton (Self) [phone] (M)        Alternative phone number: NA     Can a detailed message be left? Yes    Message Turnaround: [place]:    Refer to site's KB page for routing instructions    Please give this turnaround time to the caller:   \"You can expect to receive a response 2-3 business days after your provider's clinical team reviews the message\"              
Reviewed endocrinology order and it is not in pending status but active. However spoke with patient and ordered it again.   
I was present during the key portion of the procedure.

## 2023-05-01 NOTE — PATIENT PROFILE ADULT. - COMFORT LEVEL, ACCEPTABLE
Caller: Mannie Valdivia    5

## 2025-04-01 NOTE — DISCHARGE NOTE ADULT - NURSING SECTION COMPLETE
Patient's goal A1c is < 7%.  Is pt at goal? Yes, 6.1% on 2.7.25  Patient's SMBGs are at goal.  Rationale for plan: Will plan to continue Zepbound 7.5 mg weekly until supply gone, then given Mounjaro prior authorization was approved by insurance, will plan to switch back to Mounjaro after completion of Zepbound 7.5 mg. Will plan to increase Mounjaro to 10 mg weekly for increased appetite suppression/weight loss potential, and aim to stay at this dose for 3-6 months.    Medication Changes:  CONTINUE:  Metformin 500 mg daily  Jardiance 10 mg daily  Zepbound 7.5 mg subcutaneous weekly until gone, then switch and increase to Mounjaro 10 mg subcutaneously once weekly    Monitoring and Education:  Counseled patient on MOA, expectations, side effects, duration of therapy, administration, and monitoring parameters.  Answered all patient questions and concerns  
Patient/Caregiver provided printed discharge information.

## 2025-06-09 NOTE — CONSULT NOTE ADULT - SUBJECTIVE AND OBJECTIVE BOX
Patient is currently admitted and may have changes made to medications.   HPI:  82M with PMH of prostate cancer, s/p prostatectomy 1990s, s/p artificial urinary sphincter, HTN, HLD, DM type 2, is on the urology service after being found to have an eroded artificial urinary sphincter. S/p urological surgical removal of the device without acute complications. The patient did have a TTE () in the hospital which revealed a bileaflet mitral valve prolapse (anterior > posterior) with a focal thickening on the atrial side of the anterior mitral valve - suspicious for vegetation and possibly moderate MR. ECG showed sinus bradycardia with 1st degree AVB ( ms), with a non-specific intra-ventricular conduction block. Cardiology has been following the patient. This morning, the patient was found to have multiple asymptomatic telemetry episodes concerning for AV dissociation, possible 3rd degree heart block. The patient was asymptomatic during these episodes with a heart rate in 30s. Denies chest pain, SOB, lightheadedness and dizziness. Denies fever and chills. Medicine was consulted to transfer the patient to our service if cardiology and EP feel that he may be monitored safely on a medicine telemetry bed and not in an ICU setting.       PAST MEDICAL & SURGICAL HISTORY:  Prostate CA  Diabetes mellitus, type 2  Essential hypertension  Hyperlipidemia  Urinary incontinence  H/O prostatectomy      Review of Systems:   CONSTITUTIONAL: No fever, weight loss, or fatigue  EYES: No eye pain, visual disturbances, or discharge  ENMT:  No difficulty hearing, tinnitus, vertigo; No sinus or throat pain  NECK: No pain or stiffness  BREASTS: No pain, masses, or nipple discharge  RESPIRATORY: No cough, wheezing, chills or hemoptysis; No shortness of breath  CARDIOVASCULAR: No chest pain, palpitations, dizziness, or leg swelling  GASTROINTESTINAL: No abdominal or epigastric pain. No nausea, vomiting, or hematemesis; No diarrhea or constipation. No melena or hematochezia.  NEUROLOGICAL: No headaches, memory loss, loss of strength, numbness, or tremors  SKIN: No itching, burning, rashes, or lesions   LYMPH NODES: No enlarged glands  MUSCULOSKELETAL: No joint pain or swelling; No muscle, back, or extremity pain      Allergies: No Known Allergies    Social History:  Denies smoking, alcohol, drug use. Lives with his family.    FAMILY HISTORY: Father had cancer (unknown type). Mother  at a young age (unknown reason)    MEDICATIONS  (STANDING):  piperacillin/tazobactam IVPB. 3.375 Gram(s) IV Intermittent every 8 hours (day 2)  vancomycin  IVPB 1000 milliGRAM(s) IV Intermittent every 12 hours (day 2)  lactated ringers. 1000 milliLiter(s) (75 mL/Hr) IV Continuous <Continuous>  heparin  Injectable 5000 Unit(s) SubCutaneous every 8 hours  aspirin enteric coated 81 milliGRAM(s) Oral daily  atorvastatin 10 milliGRAM(s) Oral at bedtime  hydrochlorothiazide 12.5 milliGRAM(s) Oral daily  docusate sodium 100 milliGRAM(s) Oral two times a day  insulin lispro (HumaLOG) corrective regimen sliding scale   SubCutaneous three times a day before meals and at bedtime  losartan 25 milliGRAM(s) Oral daily      Vital Signs Last 24 Hrs  T(C): 36.6 (17 @ 04:41), Max: 36.8 (17 @ 19:00)  HR: 52 (17 @ 04:41) (47 - 57)  BP: 141/47 (17 @ 04:41) (104/78 - 141/47)  RR: 18 (17 @ 04:41) (15 - 18)  SpO2: 99% (17 @ 04:41) (96% - 99%)    CAPILLARY BLOOD GLUCOSE  92 (2017 12:31)  93 (2017 08:18)  130 (2017 22:00)  114 (2017 16:00)      I&O's Summary    2017 07:01  -  2017 07:00  --------------------------------------------------------  IN: 2500 mL / OUT: 2460 mL / NET: 40 mL      PHYSICAL EXAM:  GENERAL: NAD, well-developed  HEAD:  Atraumatic, Normocephalic  EYES: EOMI, PERRLA, conjunctiva and sclera clear  NECK: Supple, No JVD  CHEST/LUNG: Clear to auscultation bilaterally; No wheeze  HEART: bradycardia, S1, S2; audible systolic murmur over the mitral area.  ABDOMEN: Soft, Nontender, Nondistended; Bowel sounds present  EXTREMITIES:  2+ Peripheral Pulses, No clubbing, cyanosis, or edema  PSYCH: AAOx3  NEUROLOGY: non-focal  SKIN: No rashes or lesions      LABS:                        12.3   8.1   )-----------( 184      ( 2017 06:02 )             37.0     07    142  |  106  |  19  ----------------------------<  104<H>  3.7   |  27  |  1.09    Ca    8.7      2017 06:02    HgA1c 6.3  TSH 2.29      RADIOLOGY & ADDITIONAL TESTS:    Imaging Personally Reviewed: CXR clear lung fields bilaterally    Consultant(s) Notes Reviewed:  Cardiology and Urology    Care Discussed with Consultants/Other Providers: Urology and EP fellow